# Patient Record
Sex: FEMALE | Race: WHITE | Employment: OTHER | URBAN - NONMETROPOLITAN AREA
[De-identification: names, ages, dates, MRNs, and addresses within clinical notes are randomized per-mention and may not be internally consistent; named-entity substitution may affect disease eponyms.]

---

## 2018-04-23 ENCOUNTER — HOSPITAL ENCOUNTER (OUTPATIENT)
Age: 73
Setting detail: SPECIMEN
Discharge: HOME | End: 2018-04-23
Payer: MEDICARE

## 2018-04-23 LAB
ALBUMIN SERPL-MCNC: 3.4 G/DL (ref 3.5–5.2)
ALP BLD-CCNC: 129 U/L (ref 35–104)
ALT SERPL-CCNC: 16 U/L (ref 0–32)
ANION GAP SERPL CALCULATED.3IONS-SCNC: 14 MMOL/L (ref 7–16)
APTT: 31.1 SEC (ref 24–35)
AST SERPL-CCNC: 34 U/L (ref 0–31)
BASOPHILS ABSOLUTE: 0.01 E9/L (ref 0–0.2)
BASOPHILS RELATIVE PERCENT: 0.4 % (ref 0–2)
BILIRUB SERPL-MCNC: 0.6 MG/DL (ref 0–1.2)
BUN BLDV-MCNC: 19 MG/DL (ref 8–23)
CALCIUM SERPL-MCNC: 9.3 MG/DL (ref 8.6–10.2)
CHLORIDE BLD-SCNC: 103 MMOL/L (ref 98–107)
CO2: 29 MMOL/L (ref 22–29)
CREAT SERPL-MCNC: 0.9 MG/DL (ref 0.5–1)
EOSINOPHILS ABSOLUTE: 0.09 E9/L (ref 0.05–0.5)
EOSINOPHILS RELATIVE PERCENT: 3.4 % (ref 0–6)
GFR AFRICAN AMERICAN: >60
GFR NON-AFRICAN AMERICAN: >60 ML/MIN/1.73
GLUCOSE BLD-MCNC: 126 MG/DL (ref 74–109)
HBA1C MFR BLD: 5.9 % (ref 4.8–5.9)
HCT VFR BLD CALC: 30.8 % (ref 34–48)
HEMOGLOBIN: 9.7 G/DL (ref 11.5–15.5)
IMMATURE GRANULOCYTES #: 0 E9/L
IMMATURE GRANULOCYTES %: 0 % (ref 0–5)
INR BLD: 1.1
LYMPHOCYTES ABSOLUTE: 0.95 E9/L (ref 1.5–4)
LYMPHOCYTES RELATIVE PERCENT: 35.4 % (ref 20–42)
MCH RBC QN AUTO: 25.9 PG (ref 26–35)
MCHC RBC AUTO-ENTMCNC: 31.5 % (ref 32–34.5)
MCV RBC AUTO: 82.1 FL (ref 80–99.9)
MONOCYTES ABSOLUTE: 0.16 E9/L (ref 0.1–0.95)
MONOCYTES RELATIVE PERCENT: 6 % (ref 2–12)
NEUTROPHILS ABSOLUTE: 1.47 E9/L (ref 1.8–7.3)
NEUTROPHILS RELATIVE PERCENT: 54.8 % (ref 43–80)
PDW BLD-RTO: 15.2 FL (ref 11.5–15)
PLATELET # BLD: 105 E9/L (ref 130–450)
PMV BLD AUTO: 10.6 FL (ref 7–12)
POTASSIUM SERPL-SCNC: 4.2 MMOL/L (ref 3.5–5)
PROTHROMBIN TIME: 13.1 SEC (ref 9.6–12.7)
RBC # BLD: 3.75 E12/L (ref 3.5–5.5)
SODIUM BLD-SCNC: 146 MMOL/L (ref 132–146)
T4 FREE: 1.21 NG/DL (ref 0.93–1.7)
TOTAL PROTEIN: 6.9 G/DL (ref 6.4–8.3)
TSH SERPL DL<=0.05 MIU/L-ACNC: 2.16 UIU/ML (ref 0.27–4.2)
WBC # BLD: 2.7 E9/L (ref 4.5–11.5)

## 2018-04-23 PROCEDURE — 83036 HEMOGLOBIN GLYCOSYLATED A1C: CPT

## 2018-04-23 PROCEDURE — 84443 ASSAY THYROID STIM HORMONE: CPT

## 2018-04-23 PROCEDURE — 85730 THROMBOPLASTIN TIME PARTIAL: CPT

## 2018-04-23 PROCEDURE — 85610 PROTHROMBIN TIME: CPT

## 2018-04-23 PROCEDURE — 36415 COLL VENOUS BLD VENIPUNCTURE: CPT

## 2018-04-23 PROCEDURE — 85025 COMPLETE CBC W/AUTO DIFF WBC: CPT

## 2018-04-23 PROCEDURE — 82105 ALPHA-FETOPROTEIN SERUM: CPT

## 2018-04-23 PROCEDURE — 84439 ASSAY OF FREE THYROXINE: CPT

## 2018-04-23 PROCEDURE — 80053 COMPREHEN METABOLIC PANEL: CPT

## 2018-04-25 LAB — AFP-TUMOR MARKER: 3 NG/ML (ref 0–9)

## 2018-08-13 ENCOUNTER — HOSPITAL ENCOUNTER (OUTPATIENT)
Age: 73
Discharge: HOME | End: 2018-08-15
Payer: MEDICARE

## 2018-08-13 LAB
ALBUMIN SERPL-MCNC: 3.5 G/DL (ref 3.5–5.2)
ALP BLD-CCNC: 133 U/L (ref 35–104)
ALT SERPL-CCNC: 11 U/L (ref 0–32)
ANION GAP SERPL CALCULATED.3IONS-SCNC: 17 MMOL/L (ref 7–16)
APTT: 34 SEC (ref 24.5–35.1)
AST SERPL-CCNC: 24 U/L (ref 0–31)
BASOPHILS ABSOLUTE: 0.03 E9/L (ref 0–0.2)
BASOPHILS RELATIVE PERCENT: 0.7 % (ref 0–2)
BILIRUB SERPL-MCNC: 0.9 MG/DL (ref 0–1.2)
BUN BLDV-MCNC: 17 MG/DL (ref 8–23)
CALCIUM SERPL-MCNC: 9 MG/DL (ref 8.6–10.2)
CHLORIDE BLD-SCNC: 100 MMOL/L (ref 98–107)
CO2: 23 MMOL/L (ref 22–29)
CREAT SERPL-MCNC: 1 MG/DL (ref 0.5–1)
EOSINOPHILS ABSOLUTE: 0.08 E9/L (ref 0.05–0.5)
EOSINOPHILS RELATIVE PERCENT: 1.9 % (ref 0–6)
FERRITIN: 21 NG/ML
GFR AFRICAN AMERICAN: >60
GFR NON-AFRICAN AMERICAN: 55 ML/MIN/1.73
GLUCOSE BLD-MCNC: 184 MG/DL (ref 74–109)
HCT VFR BLD CALC: 32.5 % (ref 34–48)
HEMOGLOBIN: 10.2 G/DL (ref 11.5–15.5)
IMMATURE GRANULOCYTES #: 0.02 E9/L
IMMATURE GRANULOCYTES %: 0.5 % (ref 0–5)
INR BLD: 1.1
IRON SATURATION: 20 % (ref 15–50)
IRON: 76 MCG/DL (ref 37–145)
LYMPHOCYTES ABSOLUTE: 0.77 E9/L (ref 1.5–4)
LYMPHOCYTES RELATIVE PERCENT: 18.6 % (ref 20–42)
MCH RBC QN AUTO: 25.2 PG (ref 26–35)
MCHC RBC AUTO-ENTMCNC: 31.4 % (ref 32–34.5)
MCV RBC AUTO: 80.2 FL (ref 80–99.9)
MONOCYTES ABSOLUTE: 0.32 E9/L (ref 0.1–0.95)
MONOCYTES RELATIVE PERCENT: 7.7 % (ref 2–12)
NEUTROPHILS ABSOLUTE: 2.93 E9/L (ref 1.8–7.3)
NEUTROPHILS RELATIVE PERCENT: 70.6 % (ref 43–80)
PDW BLD-RTO: 16.1 FL (ref 11.5–15)
PLATELET # BLD: 120 E9/L (ref 130–450)
PMV BLD AUTO: 12.2 FL (ref 7–12)
POTASSIUM SERPL-SCNC: 4.4 MMOL/L (ref 3.5–5)
PROTHROMBIN TIME: 13.1 SEC (ref 9.3–12.4)
RBC # BLD: 4.05 E12/L (ref 3.5–5.5)
SODIUM BLD-SCNC: 140 MMOL/L (ref 132–146)
TOTAL IRON BINDING CAPACITY: 384 MCG/DL (ref 250–450)
TOTAL PROTEIN: 7.2 G/DL (ref 6.4–8.3)
WBC # BLD: 4.2 E9/L (ref 4.5–11.5)

## 2018-08-13 PROCEDURE — 36415 COLL VENOUS BLD VENIPUNCTURE: CPT

## 2018-08-13 PROCEDURE — 85025 COMPLETE CBC W/AUTO DIFF WBC: CPT

## 2018-08-13 PROCEDURE — 83550 IRON BINDING TEST: CPT

## 2018-08-13 PROCEDURE — 82105 ALPHA-FETOPROTEIN SERUM: CPT

## 2018-08-13 PROCEDURE — 85610 PROTHROMBIN TIME: CPT

## 2018-08-13 PROCEDURE — 85730 THROMBOPLASTIN TIME PARTIAL: CPT

## 2018-08-13 PROCEDURE — 80053 COMPREHEN METABOLIC PANEL: CPT

## 2018-08-13 PROCEDURE — 83540 ASSAY OF IRON: CPT

## 2018-08-13 PROCEDURE — 82728 ASSAY OF FERRITIN: CPT

## 2018-08-15 LAB — AFP-TUMOR MARKER: 4 NG/ML (ref 0–9)

## 2019-01-01 ENCOUNTER — HOSPITAL ENCOUNTER (OUTPATIENT)
Dept: GENERAL RADIOLOGY | Age: 74
Discharge: HOME | End: 2019-10-23
Payer: MEDICARE

## 2019-01-01 ENCOUNTER — HOSPITAL ENCOUNTER (OUTPATIENT)
Age: 74
Discharge: HOME | End: 2019-10-01
Payer: MEDICARE

## 2019-01-01 ENCOUNTER — HOSPITAL ENCOUNTER (OUTPATIENT)
Dept: INTERVENTIONAL RADIOLOGY/VASCULAR | Age: 74
Discharge: HOME | End: 2019-10-21
Payer: MEDICARE

## 2019-01-01 ENCOUNTER — HOSPITAL ENCOUNTER (OUTPATIENT)
Age: 74
Discharge: HOME | End: 2019-09-29
Payer: MEDICARE

## 2019-01-01 ENCOUNTER — OFFICE VISIT (OUTPATIENT)
Dept: ONCOLOGY | Age: 74
End: 2019-01-01
Payer: MEDICARE

## 2019-01-01 ENCOUNTER — HOSPITAL ENCOUNTER (OUTPATIENT)
Dept: ULTRASOUND IMAGING | Age: 74
Discharge: HOME | End: 2019-09-29
Payer: MEDICARE

## 2019-01-01 ENCOUNTER — HOSPITAL ENCOUNTER (OUTPATIENT)
Dept: NON INVASIVE DIAGNOSTICS | Age: 74
Discharge: HOME | End: 2019-10-22
Payer: MEDICARE

## 2019-01-01 ENCOUNTER — HOSPITAL ENCOUNTER (OUTPATIENT)
Dept: INFUSION THERAPY | Age: 74
Discharge: HOME | End: 2019-10-02
Payer: MEDICARE

## 2019-01-01 ENCOUNTER — HOSPITAL ENCOUNTER (OUTPATIENT)
Age: 74
Discharge: HOME | End: 2019-09-09
Payer: MEDICARE

## 2019-01-01 ENCOUNTER — HOSPITAL ENCOUNTER (OUTPATIENT)
Dept: INFUSION THERAPY | Age: 74
Discharge: HOME | End: 2019-10-16
Payer: MEDICARE

## 2019-01-01 ENCOUNTER — HOSPITAL ENCOUNTER (OUTPATIENT)
Dept: CT IMAGING | Age: 74
Discharge: HOME | End: 2019-09-29
Payer: MEDICARE

## 2019-01-01 VITALS
DIASTOLIC BLOOD PRESSURE: 67 MMHG | WEIGHT: 246.9 LBS | TEMPERATURE: 96.9 F | BODY MASS INDEX: 41.13 KG/M2 | HEART RATE: 73 BPM | HEIGHT: 65 IN | SYSTOLIC BLOOD PRESSURE: 143 MMHG | RESPIRATION RATE: 16 BRPM

## 2019-01-01 VITALS
OXYGEN SATURATION: 92 % | HEIGHT: 65 IN | HEART RATE: 72 BPM | DIASTOLIC BLOOD PRESSURE: 61 MMHG | SYSTOLIC BLOOD PRESSURE: 135 MMHG | WEIGHT: 256.7 LBS | BODY MASS INDEX: 42.77 KG/M2 | TEMPERATURE: 96.6 F

## 2019-01-01 DIAGNOSIS — R53.81 OTHER MALAISE: ICD-10-CM

## 2019-01-01 DIAGNOSIS — J93.83 OTHER PNEUMOTHORAX: ICD-10-CM

## 2019-01-01 DIAGNOSIS — D64.9 NORMOCYTIC ANEMIA: Primary | ICD-10-CM

## 2019-01-01 DIAGNOSIS — R79.9 ABNORMAL FINDING OF BLOOD CHEMISTRY, UNSPECIFIED: ICD-10-CM

## 2019-01-01 DIAGNOSIS — R60.9 EDEMA, UNSPECIFIED TYPE: ICD-10-CM

## 2019-01-01 DIAGNOSIS — D61.818 PANCYTOPENIA (HCC): Primary | ICD-10-CM

## 2019-01-01 DIAGNOSIS — R06.09 DYSPNEA ON EXERTION: ICD-10-CM

## 2019-01-01 DIAGNOSIS — M54.40 ACUTE RIGHT-SIDED LOW BACK PAIN WITH SCIATICA, SCIATICA LATERALITY UNSPECIFIED: ICD-10-CM

## 2019-01-01 DIAGNOSIS — D61.818 PANCYTOPENIA (HCC): ICD-10-CM

## 2019-01-01 DIAGNOSIS — I85.01 BLEEDING ESOPHAGEAL VARICES, UNSPECIFIED ESOPHAGEAL VARICES TYPE (HCC): ICD-10-CM

## 2019-01-01 LAB
AFP-TUMOR MARKER: 3 NG/ML (ref 0–9)
ALBUMIN SERPL-MCNC: 2.9 G/DL (ref 3.5–5.2)
ALBUMIN SERPL-MCNC: 3.1 G/DL (ref 3.5–5.2)
ALP BLD-CCNC: 185 U/L (ref 35–104)
ALP BLD-CCNC: 204 U/L (ref 35–104)
ALT SERPL-CCNC: 12 U/L (ref 0–32)
ALT SERPL-CCNC: 13 U/L (ref 0–32)
ANION GAP SERPL CALCULATED.3IONS-SCNC: 15 MMOL/L (ref 7–16)
ANION GAP SERPL CALCULATED.3IONS-SCNC: 8 MMOL/L (ref 7–16)
ANISOCYTOSIS: ABNORMAL
ANISOCYTOSIS: ABNORMAL
ANTI-NUCLEAR ANTIBODY (ANA): NEGATIVE
APTT: 32.5 SEC (ref 24.5–35.1)
AST SERPL-CCNC: 36 U/L (ref 0–31)
AST SERPL-CCNC: 37 U/L (ref 0–31)
ATYPICAL LYMPHOCYTE RELATIVE PERCENT: 3 % (ref 0–4)
BASOPHILS ABSOLUTE: 0 E9/L (ref 0–0.2)
BASOPHILS ABSOLUTE: 0 E9/L (ref 0–0.2)
BASOPHILS RELATIVE PERCENT: 0 % (ref 0–2)
BASOPHILS RELATIVE PERCENT: 0.4 % (ref 0–2)
BILIRUB SERPL-MCNC: 0.6 MG/DL (ref 0–1.2)
BILIRUB SERPL-MCNC: 0.9 MG/DL (ref 0–1.2)
BODY FLUID CULTURE, STERILE: NORMAL
BUN BLDV-MCNC: 16 MG/DL (ref 8–23)
BUN BLDV-MCNC: 17 MG/DL (ref 8–23)
CALCIUM SERPL-MCNC: 8.8 MG/DL (ref 8.6–10.2)
CALCIUM SERPL-MCNC: 9.1 MG/DL (ref 8.6–10.2)
CHLORIDE BLD-SCNC: 104 MMOL/L (ref 98–107)
CHLORIDE BLD-SCNC: 106 MMOL/L (ref 98–107)
CHOLESTEROL, TOTAL: 112 MG/DL (ref 0–199)
CO2: 24 MMOL/L (ref 22–29)
CO2: 24 MMOL/L (ref 22–29)
COPPER: 119.1 UG/DL (ref 80–155)
CREAT SERPL-MCNC: 1.1 MG/DL (ref 0.5–1)
CREAT SERPL-MCNC: 1.2 MG/DL (ref 0.5–1)
D DIMER: 836 NG/ML DDU
DAT POLYSPECIFIC: NORMAL
EKG ATRIAL RATE: 75 BPM
EKG P AXIS: 51 DEGREES
EKG P-R INTERVAL: 130 MS
EKG Q-T INTERVAL: 412 MS
EKG QRS DURATION: 80 MS
EKG QTC CALCULATION (BAZETT): 460 MS
EKG R AXIS: 26 DEGREES
EKG T AXIS: 15 DEGREES
EKG VENTRICULAR RATE: 75 BPM
EOSINOPHILS ABSOLUTE: 0 E9/L (ref 0.05–0.5)
EOSINOPHILS ABSOLUTE: 0 E9/L (ref 0.05–0.5)
EOSINOPHILS RELATIVE PERCENT: 0 % (ref 0–6)
EOSINOPHILS RELATIVE PERCENT: 2.4 % (ref 0–6)
FERRITIN: 23 NG/ML
FOLATE: 12.6 NG/ML (ref 4.8–24.2)
GFR AFRICAN AMERICAN: 53
GFR AFRICAN AMERICAN: 59
GFR NON-AFRICAN AMERICAN: 44 ML/MIN/1.73
GFR NON-AFRICAN AMERICAN: 49 ML/MIN/1.73
GLUCOSE BLD-MCNC: 124 MG/DL (ref 74–99)
GLUCOSE BLD-MCNC: 129 MG/DL (ref 74–99)
GRAM STAIN RESULT: NORMAL
HBA1C MFR BLD: 5.1 % (ref 4–5.6)
HCT VFR BLD CALC: 25.7 % (ref 34–48)
HCT VFR BLD CALC: 29.2 % (ref 34–48)
HDLC SERPL-MCNC: 33 MG/DL
HEMOGLOBIN: 7.6 G/DL (ref 11.5–15.5)
HEMOGLOBIN: 8.2 G/DL (ref 11.5–15.5)
HIV-1 AND HIV-2 ANTIBODIES: NORMAL
HYPOCHROMIA: ABNORMAL
HYPOCHROMIA: ABNORMAL
IMMATURE RETIC FRACT: 26.5 % (ref 3–15.9)
INR BLD: 1
INR BLD: 1.1
IRON SATURATION: 11 % (ref 15–50)
IRON: 45 MCG/DL (ref 37–145)
LDL CHOLESTEROL CALCULATED: 61 MG/DL (ref 0–99)
LV EF: 73 %
LVEF MODALITY: NORMAL
LYMPHOCYTES ABSOLUTE: 0.45 E9/L (ref 1.5–4)
LYMPHOCYTES ABSOLUTE: 0.48 E9/L (ref 1.5–4)
LYMPHOCYTES RELATIVE PERCENT: 17 % (ref 20–42)
LYMPHOCYTES RELATIVE PERCENT: 18.3 % (ref 20–42)
Lab: NORMAL
MAGNESIUM: 1.5 MG/DL (ref 1.6–2.6)
MCH RBC QN AUTO: 23.1 PG (ref 26–35)
MCH RBC QN AUTO: 23.5 PG (ref 26–35)
MCHC RBC AUTO-ENTMCNC: 28.1 % (ref 32–34.5)
MCHC RBC AUTO-ENTMCNC: 29.6 % (ref 32–34.5)
MCV RBC AUTO: 79.3 FL (ref 80–99.9)
MCV RBC AUTO: 82.3 FL (ref 80–99.9)
MONOCYTES ABSOLUTE: 0.1 E9/L (ref 0.1–0.95)
MONOCYTES ABSOLUTE: 0.12 E9/L (ref 0.1–0.95)
MONOCYTES RELATIVE PERCENT: 4.3 % (ref 2–12)
MONOCYTES RELATIVE PERCENT: 5 % (ref 2–12)
MYELOCYTE PERCENT: 1 % (ref 0–0)
NEUTROPHILS ABSOLUTE: 1.8 E9/L (ref 1.8–7.3)
NEUTROPHILS ABSOLUTE: 1.93 E9/L (ref 1.8–7.3)
NEUTROPHILS RELATIVE PERCENT: 74 % (ref 43–80)
NEUTROPHILS RELATIVE PERCENT: 77.4 % (ref 43–80)
OVALOCYTES: ABNORMAL
OVALOCYTES: ABNORMAL
PARATHYROID HORMONE INTACT: 50 PG/ML (ref 15–65)
PATHOLOGIST REVIEW: NORMAL
PDW BLD-RTO: 17.3 FL (ref 11.5–15)
PDW BLD-RTO: 17.4 FL (ref 11.5–15)
PHOSPHORUS: 3.1 MG/DL (ref 2.5–4.5)
PLATELET # BLD: 80 E9/L (ref 130–450)
PLATELET # BLD: 94 E9/L (ref 130–450)
PLATELET # BLD: 95 E9/L (ref 130–450)
PLATELET CONFIRMATION: NORMAL
PMV BLD AUTO: 11.6 FL (ref 7–12)
PMV BLD AUTO: 12.2 FL (ref 7–12)
POIKILOCYTES: ABNORMAL
POIKILOCYTES: ABNORMAL
POLYCHROMASIA: ABNORMAL
POLYCHROMASIA: ABNORMAL
POTASSIUM SERPL-SCNC: 4.4 MMOL/L (ref 3.5–5)
POTASSIUM SERPL-SCNC: 4.6 MMOL/L (ref 3.5–5)
PRO-BNP: 534 PG/ML (ref 0–125)
PROTHROMBIN TIME: 11.3 SEC (ref 9.3–12.4)
PROTHROMBIN TIME: 12.6 SEC (ref 9.3–12.4)
RBC # BLD: 3.24 E12/L (ref 3.5–5.5)
RBC # BLD: 3.55 E12/L (ref 3.5–5.5)
REPORT: NORMAL
RETIC HGB EQUIVALENT: 25.2 PG (ref 28.2–36.6)
RETICULOCYTE ABSOLUTE COUNT: 0.08 E12/L
RETICULOCYTE COUNT PCT: 2.5 % (ref 0.4–1.9)
SODIUM BLD-SCNC: 138 MMOL/L (ref 132–146)
SODIUM BLD-SCNC: 143 MMOL/L (ref 132–146)
T4 FREE: 1.26 NG/DL (ref 0.93–1.7)
TARGET CELLS: ABNORMAL
TEAR DROP CELLS: ABNORMAL
THIS TEST SENT TO: NORMAL
TOTAL IRON BINDING CAPACITY: 397 MCG/DL (ref 250–450)
TOTAL PROTEIN: 6.7 G/DL (ref 6.4–8.3)
TOTAL PROTEIN: 6.9 G/DL (ref 6.4–8.3)
TRIGL SERPL-MCNC: 89 MG/DL (ref 0–149)
TSH SERPL DL<=0.05 MIU/L-ACNC: 4.65 UIU/ML (ref 0.27–4.2)
URIC ACID, SERUM: 4.5 MG/DL (ref 2.4–5.7)
VITAMIN B-12: 727 PG/ML (ref 211–946)
VITAMIN D 25-HYDROXY: 44 NG/ML (ref 30–100)
VLDLC SERPL CALC-MCNC: 18 MG/DL
WBC # BLD: 2.4 E9/L (ref 4.5–11.5)
WBC # BLD: 2.5 E9/L (ref 4.5–11.5)
ZINC: 45.9 UG/DL (ref 60–120)

## 2019-01-01 PROCEDURE — 82728 ASSAY OF FERRITIN: CPT

## 2019-01-01 PROCEDURE — 93970 EXTREMITY STUDY: CPT

## 2019-01-01 PROCEDURE — G8427 DOCREV CUR MEDS BY ELIG CLIN: HCPCS | Performed by: INTERNAL MEDICINE

## 2019-01-01 PROCEDURE — 71045 X-RAY EXAM CHEST 1 VIEW: CPT

## 2019-01-01 PROCEDURE — C1729 CATH, DRAINAGE: HCPCS

## 2019-01-01 PROCEDURE — G8484 FLU IMMUNIZE NO ADMIN: HCPCS | Performed by: INTERNAL MEDICINE

## 2019-01-01 PROCEDURE — 99214 OFFICE O/P EST MOD 30 MIN: CPT | Performed by: INTERNAL MEDICINE

## 2019-01-01 PROCEDURE — 86038 ANTINUCLEAR ANTIBODIES: CPT

## 2019-01-01 PROCEDURE — G8400 PT W/DXA NO RESULTS DOC: HCPCS | Performed by: INTERNAL MEDICINE

## 2019-01-01 PROCEDURE — 36415 COLL VENOUS BLD VENIPUNCTURE: CPT

## 2019-01-01 PROCEDURE — G8417 CALC BMI ABV UP PARAM F/U: HCPCS | Performed by: INTERNAL MEDICINE

## 2019-01-01 PROCEDURE — 82105 ALPHA-FETOPROTEIN SERUM: CPT

## 2019-01-01 PROCEDURE — 93005 ELECTROCARDIOGRAM TRACING: CPT | Performed by: GENERAL PRACTICE

## 2019-01-01 PROCEDURE — 82306 VITAMIN D 25 HYDROXY: CPT

## 2019-01-01 PROCEDURE — 87205 SMEAR GRAM STAIN: CPT

## 2019-01-01 PROCEDURE — 36415 COLL VENOUS BLD VENIPUNCTURE: CPT | Performed by: INTERNAL MEDICINE

## 2019-01-01 PROCEDURE — 4040F PNEUMOC VAC/ADMIN/RCVD: CPT | Performed by: INTERNAL MEDICINE

## 2019-01-01 PROCEDURE — 1090F PRES/ABSN URINE INCON ASSESS: CPT | Performed by: INTERNAL MEDICINE

## 2019-01-01 PROCEDURE — 85025 COMPLETE CBC W/AUTO DIFF WBC: CPT

## 2019-01-01 PROCEDURE — 80061 LIPID PANEL: CPT

## 2019-01-01 PROCEDURE — 80053 COMPREHEN METABOLIC PANEL: CPT

## 2019-01-01 PROCEDURE — 85610 PROTHROMBIN TIME: CPT

## 2019-01-01 PROCEDURE — 88112 CYTOPATH CELL ENHANCE TECH: CPT

## 2019-01-01 PROCEDURE — 3017F COLORECTAL CA SCREEN DOC REV: CPT | Performed by: INTERNAL MEDICINE

## 2019-01-01 PROCEDURE — 86880 COOMBS TEST DIRECT: CPT

## 2019-01-01 PROCEDURE — 71275 CT ANGIOGRAPHY CHEST: CPT

## 2019-01-01 PROCEDURE — 83970 ASSAY OF PARATHORMONE: CPT

## 2019-01-01 PROCEDURE — 83036 HEMOGLOBIN GLYCOSYLATED A1C: CPT

## 2019-01-01 PROCEDURE — 83540 ASSAY OF IRON: CPT

## 2019-01-01 PROCEDURE — 82746 ASSAY OF FOLIC ACID SERUM: CPT

## 2019-01-01 PROCEDURE — 88185 FLOWCYTOMETRY/TC ADD-ON: CPT

## 2019-01-01 PROCEDURE — 1123F ACP DISCUSS/DSCN MKR DOCD: CPT | Performed by: INTERNAL MEDICINE

## 2019-01-01 PROCEDURE — 85049 AUTOMATED PLATELET COUNT: CPT

## 2019-01-01 PROCEDURE — 87070 CULTURE OTHR SPECIMN AEROBIC: CPT

## 2019-01-01 PROCEDURE — 83735 ASSAY OF MAGNESIUM: CPT

## 2019-01-01 PROCEDURE — 88184 FLOWCYTOMETRY/ TC 1 MARKER: CPT

## 2019-01-01 PROCEDURE — 84439 ASSAY OF FREE THYROXINE: CPT

## 2019-01-01 PROCEDURE — 85730 THROMBOPLASTIN TIME PARTIAL: CPT

## 2019-01-01 PROCEDURE — 88305 TISSUE EXAM BY PATHOLOGIST: CPT

## 2019-01-01 PROCEDURE — 1036F TOBACCO NON-USER: CPT | Performed by: INTERNAL MEDICINE

## 2019-01-01 PROCEDURE — 82607 VITAMIN B-12: CPT

## 2019-01-01 PROCEDURE — 93308 TTE F-UP OR LMTD: CPT

## 2019-01-01 PROCEDURE — 84550 ASSAY OF BLOOD/URIC ACID: CPT

## 2019-01-01 PROCEDURE — 86703 HIV-1/HIV-2 1 RESULT ANTBDY: CPT

## 2019-01-01 PROCEDURE — 85045 AUTOMATED RETICULOCYTE COUNT: CPT

## 2019-01-01 PROCEDURE — 6360000004 HC RX CONTRAST MEDICATION: Performed by: RADIOLOGY

## 2019-01-01 PROCEDURE — 82525 ASSAY OF COPPER: CPT

## 2019-01-01 PROCEDURE — 99212 OFFICE O/P EST SF 10 MIN: CPT

## 2019-01-01 PROCEDURE — 85378 FIBRIN DEGRADE SEMIQUANT: CPT

## 2019-01-01 PROCEDURE — 84443 ASSAY THYROID STIM HORMONE: CPT

## 2019-01-01 PROCEDURE — 84100 ASSAY OF PHOSPHORUS: CPT

## 2019-01-01 PROCEDURE — 84630 ASSAY OF ZINC: CPT

## 2019-01-01 PROCEDURE — 83550 IRON BINDING TEST: CPT

## 2019-01-01 PROCEDURE — 83880 ASSAY OF NATRIURETIC PEPTIDE: CPT

## 2019-01-01 PROCEDURE — 32555 ASPIRATE PLEURA W/ IMAGING: CPT | Performed by: RADIOLOGY

## 2019-01-01 RX ORDER — LANOLIN ALCOHOL/MO/W.PET/CERES
325 CREAM (GRAM) TOPICAL 2 TIMES DAILY WITH MEALS
Qty: 60 TABLET | Refills: 3 | Status: SHIPPED
Start: 2019-01-01 | End: 2020-01-01 | Stop reason: ALTCHOICE

## 2019-01-01 RX ORDER — ZINC SULFATE 50(220)MG
220 CAPSULE ORAL DAILY
Qty: 30 CAPSULE | Refills: 3 | Status: ON HOLD
Start: 2019-01-01 | End: 2020-01-01 | Stop reason: HOSPADM

## 2019-01-01 RX ADMIN — IOPAMIDOL 80 ML: 755 INJECTION, SOLUTION INTRAVENOUS at 09:46

## 2019-03-04 ENCOUNTER — HOSPITAL ENCOUNTER (OUTPATIENT)
Dept: NUCLEAR MEDICINE | Age: 74
Discharge: HOME | End: 2019-03-04
Payer: MEDICARE

## 2019-03-04 DIAGNOSIS — K80.20 GALLSTONES: ICD-10-CM

## 2019-03-04 PROCEDURE — A9537 TC99M MEBROFENIN: HCPCS | Performed by: RADIOLOGY

## 2019-03-04 PROCEDURE — 3430000000 HC RX DIAGNOSTIC RADIOPHARMACEUTICAL: Performed by: RADIOLOGY

## 2019-03-04 PROCEDURE — 78226 HEPATOBILIARY SYSTEM IMAGING: CPT

## 2019-03-04 RX ADMIN — Medication 6 MILLICURIE: at 10:40

## 2019-04-12 ENCOUNTER — PREP FOR PROCEDURE (OUTPATIENT)
Dept: SURGERY | Age: 74
End: 2019-04-12

## 2019-04-12 ENCOUNTER — INITIAL CONSULT (OUTPATIENT)
Dept: SURGERY | Age: 74
End: 2019-04-12
Payer: MEDICARE

## 2019-04-12 VITALS
WEIGHT: 240 LBS | TEMPERATURE: 97.9 F | BODY MASS INDEX: 39.99 KG/M2 | DIASTOLIC BLOOD PRESSURE: 65 MMHG | HEART RATE: 69 BPM | SYSTOLIC BLOOD PRESSURE: 143 MMHG | HEIGHT: 65 IN

## 2019-04-12 DIAGNOSIS — K80.12 CALCULUS OF GALLBLADDER WITH ACUTE ON CHRONIC CHOLECYSTITIS WITHOUT OBSTRUCTION: ICD-10-CM

## 2019-04-12 DIAGNOSIS — K80.12 CALCULUS OF GALLBLADDER WITH ACUTE ON CHRONIC CHOLECYSTITIS WITHOUT OBSTRUCTION: Primary | ICD-10-CM

## 2019-04-12 PROCEDURE — 3017F COLORECTAL CA SCREEN DOC REV: CPT | Performed by: SURGERY

## 2019-04-12 PROCEDURE — 1090F PRES/ABSN URINE INCON ASSESS: CPT | Performed by: SURGERY

## 2019-04-12 PROCEDURE — G8427 DOCREV CUR MEDS BY ELIG CLIN: HCPCS | Performed by: SURGERY

## 2019-04-12 PROCEDURE — 99204 OFFICE O/P NEW MOD 45 MIN: CPT | Performed by: SURGERY

## 2019-04-12 PROCEDURE — G8419 CALC BMI OUT NRM PARAM NOF/U: HCPCS | Performed by: SURGERY

## 2019-04-12 RX ORDER — TRAZODONE HYDROCHLORIDE 50 MG/1
100 TABLET ORAL NIGHTLY
Refills: 5 | COMMUNITY
Start: 2019-03-27

## 2019-04-12 RX ORDER — FUROSEMIDE 20 MG/1
20 TABLET ORAL EVERY MORNING
Status: ON HOLD | COMMUNITY
End: 2020-01-01 | Stop reason: SDUPTHER

## 2019-04-12 RX ORDER — SODIUM CHLORIDE, SODIUM LACTATE, POTASSIUM CHLORIDE, CALCIUM CHLORIDE 600; 310; 30; 20 MG/100ML; MG/100ML; MG/100ML; MG/100ML
INJECTION, SOLUTION INTRAVENOUS CONTINUOUS
Status: CANCELLED | OUTPATIENT
Start: 2019-04-12

## 2019-04-12 RX ORDER — GABAPENTIN 100 MG/1
100 CAPSULE ORAL 3 TIMES DAILY
Refills: 0 | COMMUNITY
Start: 2019-03-28

## 2019-04-12 NOTE — PROGRESS NOTES
(CORGARD) 20 MG tablet Take 0.5 tablets by mouth daily. Yes Golden Cotton,    sertraline (ZOLOFT) 50 MG tablet Take 50 mg by mouth daily. Yes Historical Provider, MD   allopurinol (ZYLOPRIM) 100 MG tablet Take 100 mg by mouth daily. Yes Historical Provider, MD   levothyroxine (SYNTHROID) 25 MCG tablet Take 25 mcg by mouth Daily. Yes Historical Provider, MD       Allergies: Patient has no known allergies. Social History:   TOBACCO:   reports that she has never smoked. She has never used smokeless tobacco.  All smokers must join the free smoking cessation program and stop smoking for 3 months before having any Bariatric surgery. ETOH:    reports that she does not drink alcohol. No family history on file. Review of Systems:  Psychiatric:  depression and anxiety  Respiratory: negative  Cardiovascular: negative  Gastrointestinal: constipation  Musculoskeletal:negative  All others reviewed, negative    Physical Exam:   VITALS: Blood pressure (!) 143/65, pulse 69, temperature 97.9 °F (36.6 °C), temperature source Oral, height 5' 5\" (1.651 m), weight 240 lb (108.9 kg). General appearance: alert, appears stated age and cooperative, does ambulate easily  Head: Normocephalic, without obvious abnormality, atraumatic  Eyes: PERRL  Ears/mouth/throat:  Ears clear, mouth normal, throat no redness  Neck: no adenopathy, no JVD, supple, symmetrical, trachea midline and thyroid not enlarged  Lungs: clear to auscultation bilaterally  Heart: regular rate and rhythm  Abdomen: soft, non-tender; bowel sounds normal; no masses,  no organomegaly  Extremities: 3+ edema  Skin: no open wounds    Assessment:  RUQ pain, gallstones and chronic cholecystitis. Chronic liver cirrhosis and esophageal varices 2016. Hgb 10.0  DVT with filter    Plan:   Cardiac clearance. Low calorie, high protein diet.   Laparoscopic cholecystectomy with cholangiogram, possible open (12232)    Discussed the risk of surgery including bleeding, infection,

## 2019-04-16 ENCOUNTER — TELEPHONE (OUTPATIENT)
Dept: SURGERY | Age: 74
End: 2019-04-16

## 2019-04-16 NOTE — TELEPHONE ENCOUNTER
Per Dr Rox Newsome patient to be scheduled for Laparoscopic cholecystectomy with cholangiogram, possible open (48816) once Cardiac Clearance obtained. Pt scheduled for ECHO on 4/30/19. Awaiting cardiac clearance.  Electronically signed by Emeterio Schwab MA on 4/16/19 at 9:36 AM

## 2019-04-18 NOTE — TELEPHONE ENCOUNTER
Call placed to Dr Rosalia Gibbons office to follow up on process, they state they have called patient 2x and still no answer Vm left. , myself then called patient to follow up no answwer Vm left.  Electronically signed by Tobin Segura MA on 4/18/19 at 1:42 PM

## 2019-04-19 ENCOUNTER — HOSPITAL ENCOUNTER (OUTPATIENT)
Age: 74
Discharge: HOME | End: 2019-04-21
Payer: MEDICARE

## 2019-04-19 PROCEDURE — 87328 CRYPTOSPORIDIUM AG IA: CPT

## 2019-04-19 PROCEDURE — 87045 FECES CULTURE AEROBIC BACT: CPT

## 2019-04-19 PROCEDURE — 83993 ASSAY FOR CALPROTECTIN FECAL: CPT

## 2019-04-19 PROCEDURE — 87329 GIARDIA AG IA: CPT

## 2019-04-19 PROCEDURE — 83520 IMMUNOASSAY QUANT NOS NONAB: CPT

## 2019-04-19 PROCEDURE — 87324 CLOSTRIDIUM AG IA: CPT

## 2019-04-19 PROCEDURE — 89055 LEUKOCYTE ASSESSMENT FECAL: CPT

## 2019-04-20 LAB
C DIFFICILE TOXIN, EIA: NORMAL
WHITE BLOOD CELLS (WBC), STOOL: NORMAL

## 2019-04-21 LAB — CULTURE, STOOL: NORMAL

## 2019-04-22 LAB
CRYPTOSPORIDIUM ANTIGEN STOOL: NORMAL
GIARDIA ANTIGEN STOOL: NORMAL

## 2019-04-23 NOTE — TELEPHONE ENCOUNTER
Patient called in stating she no longer wants surgery will call if she wants to schedule.  Electronically signed by Robert Garcia MA on 4/23/19 at 3:59 PM

## 2019-04-24 LAB
Lab: NORMAL
Lab: NORMAL
REPORT: NORMAL
REPORT: NORMAL
THIS TEST SENT TO: NORMAL
THIS TEST SENT TO: NORMAL

## 2019-04-30 ENCOUNTER — HOSPITAL ENCOUNTER (OUTPATIENT)
Dept: NON INVASIVE DIAGNOSTICS | Age: 74
Discharge: HOME | End: 2019-04-30
Payer: MEDICARE

## 2019-04-30 LAB
LV EF: 71 %
LVEF MODALITY: NORMAL

## 2019-04-30 PROCEDURE — 93306 TTE W/DOPPLER COMPLETE: CPT

## 2020-01-01 ENCOUNTER — APPOINTMENT (OUTPATIENT)
Dept: ULTRASOUND IMAGING | Age: 75
DRG: 193 | End: 2020-01-01
Payer: MEDICARE

## 2020-01-01 ENCOUNTER — APPOINTMENT (OUTPATIENT)
Dept: GENERAL RADIOLOGY | Age: 75
DRG: 193 | End: 2020-01-01
Payer: MEDICARE

## 2020-01-01 ENCOUNTER — APPOINTMENT (OUTPATIENT)
Dept: CT IMAGING | Age: 75
DRG: 193 | End: 2020-01-01
Payer: MEDICARE

## 2020-01-01 ENCOUNTER — APPOINTMENT (OUTPATIENT)
Dept: GENERAL RADIOLOGY | Age: 75
DRG: 870 | End: 2020-01-01
Attending: INTERNAL MEDICINE
Payer: MEDICARE

## 2020-01-01 ENCOUNTER — APPOINTMENT (OUTPATIENT)
Dept: ULTRASOUND IMAGING | Age: 75
DRG: 870 | End: 2020-01-01
Attending: INTERNAL MEDICINE
Payer: MEDICARE

## 2020-01-01 ENCOUNTER — HOSPITAL ENCOUNTER (INPATIENT)
Age: 75
LOS: 8 days | DRG: 870 | End: 2020-05-27
Attending: INTERNAL MEDICINE | Admitting: INTERNAL MEDICINE
Payer: MEDICARE

## 2020-01-01 ENCOUNTER — HOSPITAL ENCOUNTER (OUTPATIENT)
Dept: GENERAL RADIOLOGY | Age: 75
Discharge: HOME | End: 2020-01-22
Payer: MEDICARE

## 2020-01-01 ENCOUNTER — HOSPITAL ENCOUNTER (OUTPATIENT)
Dept: INTERVENTIONAL RADIOLOGY/VASCULAR | Age: 75
Discharge: HOME | End: 2020-01-20
Payer: MEDICARE

## 2020-01-01 ENCOUNTER — HOSPITAL ENCOUNTER (INPATIENT)
Age: 75
LOS: 16 days | Discharge: NURSING, REHAB OR LONG-TERM CARE | DRG: 193 | End: 2020-04-30
Attending: EMERGENCY MEDICINE | Admitting: INTERNAL MEDICINE
Payer: MEDICARE

## 2020-01-01 ENCOUNTER — TELEPHONE (OUTPATIENT)
Dept: CARDIOLOGY CLINIC | Age: 75
End: 2020-01-01

## 2020-01-01 ENCOUNTER — ANESTHESIA EVENT (OUTPATIENT)
Dept: OPERATING ROOM | Age: 75
DRG: 193 | End: 2020-01-01
Payer: MEDICARE

## 2020-01-01 ENCOUNTER — HOSPITAL ENCOUNTER (OUTPATIENT)
Age: 75
Discharge: HOME | End: 2020-01-20
Payer: MEDICARE

## 2020-01-01 ENCOUNTER — ANESTHESIA (OUTPATIENT)
Dept: OPERATING ROOM | Age: 75
DRG: 193 | End: 2020-01-01
Payer: MEDICARE

## 2020-01-01 VITALS
HEART RATE: 72 BPM | SYSTOLIC BLOOD PRESSURE: 115 MMHG | OXYGEN SATURATION: 98 % | TEMPERATURE: 97.4 F | BODY MASS INDEX: 40.69 KG/M2 | RESPIRATION RATE: 16 BRPM | DIASTOLIC BLOOD PRESSURE: 56 MMHG | HEIGHT: 65 IN | WEIGHT: 244.25 LBS

## 2020-01-01 VITALS
OXYGEN SATURATION: 97 % | RESPIRATION RATE: 24 BRPM | SYSTOLIC BLOOD PRESSURE: 111 MMHG | TEMPERATURE: 96.5 F | DIASTOLIC BLOOD PRESSURE: 52 MMHG | BODY MASS INDEX: 45.18 KG/M2 | HEIGHT: 65 IN | WEIGHT: 271.17 LBS

## 2020-01-01 VITALS — RESPIRATION RATE: 9 BRPM | OXYGEN SATURATION: 96 % | TEMPERATURE: 97 F

## 2020-01-01 LAB
AADO2: 155.6 MMHG
AADO2: 160.2 MMHG
AADO2: 166 MMHG
AADO2: 216.3 MMHG
AADO2: 221.7 MMHG
AADO2: 241.5 MMHG
AADO2: 257.9 MMHG
ABO/RH: NORMAL
ABO/RH: NORMAL
ACANTHOCYTES: ABNORMAL
ADENOVIRUS BY PCR: NOT DETECTED
AFB CULTURE (MYCOBACTERIA): NORMAL
AFB SMEAR: NORMAL
AFP-TUMOR MARKER: 2 NG/ML (ref 0–9)
ALBUMIN SERPL-MCNC: 2 G/DL (ref 3.5–5.2)
ALBUMIN SERPL-MCNC: 2.1 G/DL (ref 3.5–5.2)
ALBUMIN SERPL-MCNC: 2.1 G/DL (ref 3.5–5.2)
ALBUMIN SERPL-MCNC: 2.4 G/DL (ref 3.5–5.2)
ALBUMIN SERPL-MCNC: 2.5 G/DL (ref 3.5–5.2)
ALBUMIN SERPL-MCNC: 2.8 G/DL (ref 3.5–5.2)
ALBUMIN SERPL-MCNC: 3 G/DL (ref 3.5–5.2)
ALBUMIN SERPL-MCNC: 3 G/DL (ref 3.5–5.2)
ALP BLD-CCNC: 186 U/L (ref 35–104)
ALP BLD-CCNC: 213 U/L (ref 35–104)
ALP BLD-CCNC: 222 U/L (ref 35–104)
ALP BLD-CCNC: 224 U/L (ref 35–104)
ALP BLD-CCNC: 242 U/L (ref 35–104)
ALP BLD-CCNC: 250 U/L (ref 35–104)
ALP BLD-CCNC: 298 U/L (ref 35–104)
ALP BLD-CCNC: 343 U/L (ref 35–104)
ALT SERPL-CCNC: 15 U/L (ref 0–32)
ALT SERPL-CCNC: 16 U/L (ref 0–32)
ALT SERPL-CCNC: 19 U/L (ref 0–32)
ALT SERPL-CCNC: 23 U/L (ref 0–32)
ALT SERPL-CCNC: 24 U/L (ref 0–32)
ALT SERPL-CCNC: 26 U/L (ref 0–32)
ALT SERPL-CCNC: 30 U/L (ref 0–32)
ALT SERPL-CCNC: 32 U/L (ref 0–32)
AMYLASE FLUID: 31 U/L
ANAEROBIC CULTURE: NORMAL
ANION GAP SERPL CALCULATED.3IONS-SCNC: 10 MMOL/L (ref 7–16)
ANION GAP SERPL CALCULATED.3IONS-SCNC: 10 MMOL/L (ref 7–16)
ANION GAP SERPL CALCULATED.3IONS-SCNC: 18 MMOL/L (ref 7–16)
ANION GAP SERPL CALCULATED.3IONS-SCNC: 18 MMOL/L (ref 7–16)
ANION GAP SERPL CALCULATED.3IONS-SCNC: 19 MMOL/L (ref 7–16)
ANION GAP SERPL CALCULATED.3IONS-SCNC: 19 MMOL/L (ref 7–16)
ANION GAP SERPL CALCULATED.3IONS-SCNC: 20 MMOL/L (ref 7–16)
ANION GAP SERPL CALCULATED.3IONS-SCNC: 21 MMOL/L (ref 7–16)
ANION GAP SERPL CALCULATED.3IONS-SCNC: 23 MMOL/L (ref 7–16)
ANION GAP SERPL CALCULATED.3IONS-SCNC: 25 MMOL/L (ref 7–16)
ANION GAP SERPL CALCULATED.3IONS-SCNC: 29 MMOL/L (ref 7–16)
ANION GAP SERPL CALCULATED.3IONS-SCNC: 5 MMOL/L (ref 7–16)
ANION GAP SERPL CALCULATED.3IONS-SCNC: 6 MMOL/L (ref 7–16)
ANION GAP SERPL CALCULATED.3IONS-SCNC: 6 MMOL/L (ref 7–16)
ANION GAP SERPL CALCULATED.3IONS-SCNC: 7 MMOL/L (ref 7–16)
ANION GAP SERPL CALCULATED.3IONS-SCNC: 8 MMOL/L (ref 7–16)
ANION GAP SERPL CALCULATED.3IONS-SCNC: 9 MMOL/L (ref 7–16)
ANISOCYTOSIS: ABNORMAL
ANTIBODY IDENTIFICATION: NORMAL
ANTIBODY SCREEN: NORMAL
ANTIBODY SCREEN: NORMAL
APPEARANCE FLUID: NORMAL
AST SERPL-CCNC: 129 U/L (ref 0–31)
AST SERPL-CCNC: 167 U/L (ref 0–31)
AST SERPL-CCNC: 31 U/L (ref 0–31)
AST SERPL-CCNC: 35 U/L (ref 0–31)
AST SERPL-CCNC: 41 U/L (ref 0–31)
AST SERPL-CCNC: 48 U/L (ref 0–31)
AST SERPL-CCNC: 58 U/L (ref 0–31)
AST SERPL-CCNC: 78 U/L (ref 0–31)
B.E.: -1.2 MMOL/L (ref -3–3)
B.E.: -1.7 MMOL/L (ref -3–3)
B.E.: -10.8 MMOL/L (ref -3–0)
B.E.: -2.1 MMOL/L (ref -3–3)
B.E.: -3.5 MMOL/L (ref -3–3)
B.E.: -7.7 MMOL/L (ref -3–3)
B.E.: 1.3 MMOL/L (ref -3–3)
B.E.: 3 MMOL/L (ref -3–3)
B.E.: 4.2 MMOL/L (ref -3–3)
B.E.: 4.2 MMOL/L (ref -3–3)
BACTERIA: ABNORMAL /HPF
BASOPHILIC STIPPLING: ABNORMAL
BASOPHILS ABSOLUTE: 0 E9/L (ref 0–0.2)
BASOPHILS ABSOLUTE: 0.01 E9/L (ref 0–0.2)
BASOPHILS ABSOLUTE: 0.02 E9/L (ref 0–0.2)
BASOPHILS ABSOLUTE: 0.04 E9/L (ref 0–0.2)
BASOPHILS RELATIVE PERCENT: 0 % (ref 0–2)
BASOPHILS RELATIVE PERCENT: 0.1 % (ref 0–2)
BASOPHILS RELATIVE PERCENT: 0.1 % (ref 0–2)
BASOPHILS RELATIVE PERCENT: 0.2 % (ref 0–2)
BASOPHILS RELATIVE PERCENT: 0.3 % (ref 0–2)
BASOPHILS RELATIVE PERCENT: 0.9 % (ref 0–2)
BILIRUB SERPL-MCNC: 0.3 MG/DL (ref 0–1.2)
BILIRUB SERPL-MCNC: 0.5 MG/DL (ref 0–1.2)
BILIRUB SERPL-MCNC: 0.6 MG/DL (ref 0–1.2)
BILIRUB SERPL-MCNC: 0.6 MG/DL (ref 0–1.2)
BILIRUB SERPL-MCNC: 0.7 MG/DL (ref 0–1.2)
BILIRUB SERPL-MCNC: 0.7 MG/DL (ref 0–1.2)
BILIRUB SERPL-MCNC: 1 MG/DL (ref 0–1.2)
BILIRUB SERPL-MCNC: 1.3 MG/DL (ref 0–1.2)
BILIRUBIN DIRECT: 0.3 MG/DL (ref 0–0.3)
BILIRUBIN URINE: NEGATIVE
BILIRUBIN, INDIRECT: 0.7 MG/DL (ref 0–1)
BLOOD BANK DISPENSE STATUS: NORMAL
BLOOD BANK PRODUCT CODE: NORMAL
BLOOD CULTURE, ROUTINE: NORMAL
BLOOD, URINE: ABNORMAL
BODY FLUID CULTURE, STERILE: NORMAL
BODY FLUID CULTURE, STERILE: NORMAL
BORDETELLA PARAPERTUSSIS BY PCR: NOT DETECTED
BORDETELLA PERTUSSIS BY PCR: NOT DETECTED
BPU ID: NORMAL
BUN BLDV-MCNC: 104 MG/DL (ref 8–23)
BUN BLDV-MCNC: 13 MG/DL (ref 8–23)
BUN BLDV-MCNC: 19 MG/DL (ref 8–23)
BUN BLDV-MCNC: 22 MG/DL (ref 8–23)
BUN BLDV-MCNC: 23 MG/DL (ref 8–23)
BUN BLDV-MCNC: 23 MG/DL (ref 8–23)
BUN BLDV-MCNC: 26 MG/DL (ref 8–23)
BUN BLDV-MCNC: 31 MG/DL (ref 8–23)
BUN BLDV-MCNC: 31 MG/DL (ref 8–23)
BUN BLDV-MCNC: 36 MG/DL (ref 8–23)
BUN BLDV-MCNC: 37 MG/DL (ref 8–23)
BUN BLDV-MCNC: 38 MG/DL (ref 8–23)
BUN BLDV-MCNC: 43 MG/DL (ref 8–23)
BUN BLDV-MCNC: 44 MG/DL (ref 8–23)
BUN BLDV-MCNC: 47 MG/DL (ref 8–23)
BUN BLDV-MCNC: 50 MG/DL (ref 8–23)
BUN BLDV-MCNC: 59 MG/DL (ref 8–23)
BUN BLDV-MCNC: 63 MG/DL (ref 8–23)
BUN BLDV-MCNC: 65 MG/DL (ref 8–23)
BURR CELLS: ABNORMAL
C-REACTIVE PROTEIN: 1.4 MG/DL (ref 0–0.4)
CALCIUM SERPL-MCNC: 6.7 MG/DL (ref 8.6–10.2)
CALCIUM SERPL-MCNC: 7.4 MG/DL (ref 8.6–10.2)
CALCIUM SERPL-MCNC: 7.6 MG/DL (ref 8.6–10.2)
CALCIUM SERPL-MCNC: 7.7 MG/DL (ref 8.6–10.2)
CALCIUM SERPL-MCNC: 7.8 MG/DL (ref 8.6–10.2)
CALCIUM SERPL-MCNC: 7.8 MG/DL (ref 8.6–10.2)
CALCIUM SERPL-MCNC: 7.9 MG/DL (ref 8.6–10.2)
CALCIUM SERPL-MCNC: 8 MG/DL (ref 8.6–10.2)
CALCIUM SERPL-MCNC: 8.2 MG/DL (ref 8.6–10.2)
CALCIUM SERPL-MCNC: 8.4 MG/DL (ref 8.6–10.2)
CALCIUM SERPL-MCNC: 8.4 MG/DL (ref 8.6–10.2)
CALCIUM SERPL-MCNC: 8.5 MG/DL (ref 8.6–10.2)
CALCIUM SERPL-MCNC: 8.5 MG/DL (ref 8.6–10.2)
CALCIUM SERPL-MCNC: 8.6 MG/DL (ref 8.6–10.2)
CALCIUM SERPL-MCNC: 8.7 MG/DL (ref 8.6–10.2)
CALCIUM SERPL-MCNC: 8.8 MG/DL (ref 8.6–10.2)
CALCIUM SERPL-MCNC: 9 MG/DL (ref 8.6–10.2)
CALCIUM SERPL-MCNC: 9.2 MG/DL (ref 8.6–10.2)
CELL COUNT FLUID TYPE: NORMAL
CHLAMYDOPHILIA PNEUMONIAE BY PCR: NOT DETECTED
CHLORIDE BLD-SCNC: 105 MMOL/L (ref 98–107)
CHLORIDE BLD-SCNC: 106 MMOL/L (ref 98–107)
CHLORIDE BLD-SCNC: 107 MMOL/L (ref 98–107)
CHLORIDE BLD-SCNC: 86 MMOL/L (ref 98–107)
CHLORIDE BLD-SCNC: 86 MMOL/L (ref 98–107)
CHLORIDE BLD-SCNC: 88 MMOL/L (ref 98–107)
CHLORIDE BLD-SCNC: 90 MMOL/L (ref 98–107)
CHLORIDE BLD-SCNC: 91 MMOL/L (ref 98–107)
CHLORIDE BLD-SCNC: 91 MMOL/L (ref 98–107)
CHLORIDE BLD-SCNC: 92 MMOL/L (ref 98–107)
CHLORIDE BLD-SCNC: 92 MMOL/L (ref 98–107)
CHLORIDE BLD-SCNC: 94 MMOL/L (ref 98–107)
CLARITY: CLEAR
CO2: 14 MMOL/L (ref 22–29)
CO2: 17 MMOL/L (ref 22–29)
CO2: 20 MMOL/L (ref 22–29)
CO2: 21 MMOL/L (ref 22–29)
CO2: 22 MMOL/L (ref 22–29)
CO2: 23 MMOL/L (ref 22–29)
CO2: 25 MMOL/L (ref 22–29)
CO2: 25 MMOL/L (ref 22–29)
CO2: 26 MMOL/L (ref 22–29)
CO2: 27 MMOL/L (ref 22–29)
CO2: 27 MMOL/L (ref 22–29)
CO2: 28 MMOL/L (ref 22–29)
CO2: 29 MMOL/L (ref 22–29)
CO2: 32 MMOL/L (ref 22–29)
COHB: 0.1 % (ref 0–1.5)
COHB: 0.4 % (ref 0–1.5)
COHB: 0.4 % (ref 0–1.5)
COHB: 0.5 % (ref 0–1.5)
COHB: 0.6 % (ref 0–1.5)
COHB: 0.8 % (ref 0–1.5)
COHB: 0.8 % (ref 0–1.5)
COHB: 1 % (ref 0–1.5)
COHB: 1.2 % (ref 0–1.5)
COLOR FLUID: YELLOW
COLOR: YELLOW
CORONAVIRUS 229E BY PCR: NOT DETECTED
CORONAVIRUS HKU1 BY PCR: NOT DETECTED
CORONAVIRUS NL63 BY PCR: NOT DETECTED
CORONAVIRUS OC43 BY PCR: NOT DETECTED
CORTISOL TOTAL: 20.38 MCG/DL (ref 2.68–18.4)
CREAT SERPL-MCNC: 0.9 MG/DL (ref 0.5–1)
CREAT SERPL-MCNC: 1 MG/DL (ref 0.5–1)
CREAT SERPL-MCNC: 1.1 MG/DL (ref 0.5–1)
CREAT SERPL-MCNC: 1.2 MG/DL (ref 0.5–1)
CREAT SERPL-MCNC: 2.9 MG/DL (ref 0.5–1)
CREAT SERPL-MCNC: 3 MG/DL (ref 0.5–1)
CREAT SERPL-MCNC: 3.3 MG/DL (ref 0.5–1)
CREAT SERPL-MCNC: 3.4 MG/DL (ref 0.5–1)
CREAT SERPL-MCNC: 3.5 MG/DL (ref 0.5–1)
CREAT SERPL-MCNC: 3.5 MG/DL (ref 0.5–1)
CREAT SERPL-MCNC: 3.6 MG/DL (ref 0.5–1)
CREAT SERPL-MCNC: 4.6 MG/DL (ref 0.5–1)
CREAT SERPL-MCNC: 6.6 MG/DL (ref 0.5–1)
CREATININE URINE: 118 MG/DL (ref 29–226)
CRITICAL: ABNORMAL
CULTURE CATHETER TIP: NORMAL
CULTURE, BLOOD 2: NORMAL
CULTURE, RESPIRATORY: ABNORMAL
CULTURE, RESPIRATORY: ABNORMAL
D DIMER: 1934 NG/ML DDU
DAT POLYSPECIFIC: NORMAL
DAT POLYSPECIFIC: NORMAL
DATE ANALYZED: ABNORMAL
DATE OF COLLECTION: ABNORMAL
DESCRIPTION BLOOD BANK: NORMAL
DEVICE: ABNORMAL
DR. NOTIFY: NORMAL
DR. NOTIFY: NORMAL
EKG ATRIAL RATE: 59 BPM
EKG ATRIAL RATE: 61 BPM
EKG ATRIAL RATE: 62 BPM
EKG ATRIAL RATE: 62 BPM
EKG ATRIAL RATE: 66 BPM
EKG ATRIAL RATE: 67 BPM
EKG ATRIAL RATE: 67 BPM
EKG ATRIAL RATE: 68 BPM
EKG ATRIAL RATE: 68 BPM
EKG ATRIAL RATE: 69 BPM
EKG ATRIAL RATE: 69 BPM
EKG ATRIAL RATE: 74 BPM
EKG ATRIAL RATE: 75 BPM
EKG P AXIS: 2 DEGREES
EKG P AXIS: 21 DEGREES
EKG P AXIS: 24 DEGREES
EKG P AXIS: 27 DEGREES
EKG P AXIS: 33 DEGREES
EKG P AXIS: 43 DEGREES
EKG P AXIS: 43 DEGREES
EKG P AXIS: 45 DEGREES
EKG P AXIS: 50 DEGREES
EKG P AXIS: 52 DEGREES
EKG P AXIS: 59 DEGREES
EKG P-R INTERVAL: 122 MS
EKG P-R INTERVAL: 124 MS
EKG P-R INTERVAL: 124 MS
EKG P-R INTERVAL: 126 MS
EKG P-R INTERVAL: 128 MS
EKG P-R INTERVAL: 130 MS
EKG P-R INTERVAL: 132 MS
EKG P-R INTERVAL: 132 MS
EKG P-R INTERVAL: 134 MS
EKG P-R INTERVAL: 134 MS
EKG P-R INTERVAL: 136 MS
EKG P-R INTERVAL: 136 MS
EKG P-R INTERVAL: 96 MS
EKG Q-T INTERVAL: 392 MS
EKG Q-T INTERVAL: 414 MS
EKG Q-T INTERVAL: 416 MS
EKG Q-T INTERVAL: 416 MS
EKG Q-T INTERVAL: 420 MS
EKG Q-T INTERVAL: 422 MS
EKG Q-T INTERVAL: 434 MS
EKG Q-T INTERVAL: 438 MS
EKG Q-T INTERVAL: 440 MS
EKG Q-T INTERVAL: 446 MS
EKG Q-T INTERVAL: 448 MS
EKG Q-T INTERVAL: 454 MS
EKG Q-T INTERVAL: 454 MS
EKG QRS DURATION: 78 MS
EKG QRS DURATION: 80 MS
EKG QRS DURATION: 82 MS
EKG QRS DURATION: 82 MS
EKG QRS DURATION: 84 MS
EKG QRS DURATION: 90 MS
EKG QTC CALCULATION (BAZETT): 428 MS
EKG QTC CALCULATION (BAZETT): 435 MS
EKG QTC CALCULATION (BAZETT): 435 MS
EKG QTC CALCULATION (BAZETT): 439 MS
EKG QTC CALCULATION (BAZETT): 440 MS
EKG QTC CALCULATION (BAZETT): 440 MS
EKG QTC CALCULATION (BAZETT): 450 MS
EKG QTC CALCULATION (BAZETT): 458 MS
EKG QTC CALCULATION (BAZETT): 460 MS
EKG QTC CALCULATION (BAZETT): 464 MS
EKG QTC CALCULATION (BAZETT): 467 MS
EKG QTC CALCULATION (BAZETT): 480 MS
EKG QTC CALCULATION (BAZETT): 482 MS
EKG R AXIS: 1 DEGREES
EKG R AXIS: 14 DEGREES
EKG R AXIS: 16 DEGREES
EKG R AXIS: 19 DEGREES
EKG R AXIS: 2 DEGREES
EKG R AXIS: 20 DEGREES
EKG R AXIS: 24 DEGREES
EKG R AXIS: 28 DEGREES
EKG R AXIS: 29 DEGREES
EKG R AXIS: 29 DEGREES
EKG R AXIS: 33 DEGREES
EKG R AXIS: 51 DEGREES
EKG R AXIS: 65 DEGREES
EKG T AXIS: -8 DEGREES
EKG T AXIS: 10 DEGREES
EKG T AXIS: 10 DEGREES
EKG T AXIS: 12 DEGREES
EKG T AXIS: 13 DEGREES
EKG T AXIS: 14 DEGREES
EKG T AXIS: 15 DEGREES
EKG T AXIS: 18 DEGREES
EKG T AXIS: 2 DEGREES
EKG T AXIS: 3 DEGREES
EKG T AXIS: 3 DEGREES
EKG T AXIS: 34 DEGREES
EKG T AXIS: 4 DEGREES
EKG VENTRICULAR RATE: 59 BPM
EKG VENTRICULAR RATE: 61 BPM
EKG VENTRICULAR RATE: 62 BPM
EKG VENTRICULAR RATE: 62 BPM
EKG VENTRICULAR RATE: 66 BPM
EKG VENTRICULAR RATE: 67 BPM
EKG VENTRICULAR RATE: 67 BPM
EKG VENTRICULAR RATE: 68 BPM
EKG VENTRICULAR RATE: 68 BPM
EKG VENTRICULAR RATE: 69 BPM
EKG VENTRICULAR RATE: 69 BPM
EKG VENTRICULAR RATE: 74 BPM
EKG VENTRICULAR RATE: 75 BPM
EOSINOPHILS ABSOLUTE: 0 E9/L (ref 0.05–0.5)
EOSINOPHILS ABSOLUTE: 0.01 E9/L (ref 0.05–0.5)
EOSINOPHILS RELATIVE PERCENT: 0 % (ref 0–6)
EOSINOPHILS RELATIVE PERCENT: 0.2 % (ref 0–6)
EOSINOPHILS RELATIVE PERCENT: 0.4 % (ref 0–6)
EOSINOPHILS RELATIVE PERCENT: 0.4 % (ref 0–6)
EOSINOPHILS RELATIVE PERCENT: 0.8 % (ref 0–6)
EOSINOPHILS RELATIVE PERCENT: 1 % (ref 0–6)
EPITHELIAL CELLS, UA: ABNORMAL /HPF
FERRITIN: 21 NG/ML
FERRITIN: 803 NG/ML
FIO2 ARTERIAL: 50
FIO2: 40 %
FIO2: 50 %
FIO2: 55 %
FIO2: 80 %
FLUID TYPE: NORMAL
FUNGUS (MYCOLOGY) CULTURE: NORMAL
FUNGUS STAIN: NORMAL
GFR AFRICAN AMERICAN: 11
GFR AFRICAN AMERICAN: 15
GFR AFRICAN AMERICAN: 16
GFR AFRICAN AMERICAN: 17
GFR AFRICAN AMERICAN: 19
GFR AFRICAN AMERICAN: 19
GFR AFRICAN AMERICAN: 53
GFR AFRICAN AMERICAN: 59
GFR AFRICAN AMERICAN: 7
GFR AFRICAN AMERICAN: >60
GFR NON-AFRICAN AMERICAN: 12 ML/MIN/1.73
GFR NON-AFRICAN AMERICAN: 13 ML/MIN/1.73
GFR NON-AFRICAN AMERICAN: 14 ML/MIN/1.73
GFR NON-AFRICAN AMERICAN: 15 ML/MIN/1.73
GFR NON-AFRICAN AMERICAN: 16 ML/MIN/1.73
GFR NON-AFRICAN AMERICAN: 44 ML/MIN/1.73
GFR NON-AFRICAN AMERICAN: 49 ML/MIN/1.73
GFR NON-AFRICAN AMERICAN: 54 ML/MIN/1.73
GFR NON-AFRICAN AMERICAN: 6 ML/MIN/1.73
GFR NON-AFRICAN AMERICAN: 9 ML/MIN/1.73
GFR NON-AFRICAN AMERICAN: >60 ML/MIN/1.73
GLUCOSE BLD-MCNC: 107 MG/DL (ref 74–99)
GLUCOSE BLD-MCNC: 113 MG/DL (ref 74–99)
GLUCOSE BLD-MCNC: 116 MG/DL (ref 74–99)
GLUCOSE BLD-MCNC: 122 MG/DL (ref 74–99)
GLUCOSE BLD-MCNC: 124 MG/DL (ref 74–99)
GLUCOSE BLD-MCNC: 125 MG/DL (ref 74–99)
GLUCOSE BLD-MCNC: 127 MG/DL (ref 74–99)
GLUCOSE BLD-MCNC: 130 MG/DL (ref 74–99)
GLUCOSE BLD-MCNC: 132 MG/DL (ref 74–99)
GLUCOSE BLD-MCNC: 139 MG/DL (ref 74–99)
GLUCOSE BLD-MCNC: 145 MG/DL (ref 74–99)
GLUCOSE BLD-MCNC: 150 MG/DL (ref 74–99)
GLUCOSE BLD-MCNC: 165 MG/DL (ref 74–99)
GLUCOSE BLD-MCNC: 168 MG/DL (ref 74–99)
GLUCOSE BLD-MCNC: 172 MG/DL (ref 74–99)
GLUCOSE BLD-MCNC: 176 MG/DL (ref 74–99)
GLUCOSE BLD-MCNC: 196 MG/DL (ref 74–99)
GLUCOSE BLD-MCNC: 205 MG/DL (ref 74–99)
GLUCOSE BLD-MCNC: 211 MG/DL (ref 74–99)
GLUCOSE BLD-MCNC: 214 MG/DL (ref 74–99)
GLUCOSE BLD-MCNC: 243 MG/DL (ref 74–99)
GLUCOSE BLD-MCNC: 248 MG/DL (ref 74–99)
GLUCOSE URINE: NEGATIVE MG/DL
GLUCOSE, FLUID: 141 MG/DL
GRAM STAIN ORDERABLE: NORMAL
GRAM STAIN ORDERABLE: NORMAL
GRAM STAIN RESULT: NORMAL
GRAM STAIN RESULT: NORMAL
HAV IGM SER IA-ACNC: NORMAL
HBA1C MFR BLD: 5.5 % (ref 4–5.6)
HBV SURFACE AB TITR SER: NORMAL {TITER}
HCO3 ARTERIAL: 14.8 MMOL/L (ref 22–26)
HCO3: 15.6 MMOL/L (ref 22–26)
HCO3: 22.7 MMOL/L (ref 22–26)
HCO3: 22.9 MMOL/L (ref 22–26)
HCO3: 23.2 MMOL/L (ref 22–26)
HCO3: 24.9 MMOL/L (ref 22–26)
HCO3: 25.7 MMOL/L (ref 22–26)
HCO3: 26.1 MMOL/L (ref 22–26)
HCO3: 27.6 MMOL/L (ref 22–26)
HCO3: 27.8 MMOL/L (ref 22–26)
HCT VFR BLD CALC: 19.7 % (ref 34–48)
HCT VFR BLD CALC: 24.5 % (ref 34–48)
HCT VFR BLD CALC: 25.1 % (ref 34–48)
HCT VFR BLD CALC: 25.7 % (ref 34–48)
HCT VFR BLD CALC: 26.4 % (ref 34–48)
HCT VFR BLD CALC: 26.6 % (ref 34–48)
HCT VFR BLD CALC: 26.8 % (ref 34–48)
HCT VFR BLD CALC: 26.9 % (ref 34–48)
HCT VFR BLD CALC: 28.1 % (ref 34–48)
HCT VFR BLD CALC: 28.6 % (ref 34–48)
HCT VFR BLD CALC: 28.7 % (ref 34–48)
HCT VFR BLD CALC: 29.3 % (ref 34–48)
HCT VFR BLD CALC: 29.6 % (ref 34–48)
HCT VFR BLD CALC: 29.7 % (ref 34–48)
HCT VFR BLD CALC: 29.8 % (ref 34–48)
HCT VFR BLD CALC: 29.8 % (ref 34–48)
HCT VFR BLD CALC: 30.4 % (ref 34–48)
HCT VFR BLD CALC: 30.4 % (ref 34–48)
HCT VFR BLD CALC: 32.5 % (ref 34–48)
HCT VFR BLD CALC: 34.1 % (ref 34–48)
HEMOGLOBIN: 10.7 G/DL (ref 11.5–15.5)
HEMOGLOBIN: 10.8 G/DL (ref 11.5–15.5)
HEMOGLOBIN: 6.5 G/DL (ref 11.5–15.5)
HEMOGLOBIN: 7.1 G/DL (ref 11.5–15.5)
HEMOGLOBIN: 7.5 G/DL (ref 11.5–15.5)
HEMOGLOBIN: 7.6 G/DL (ref 11.5–15.5)
HEMOGLOBIN: 8.2 G/DL (ref 11.5–15.5)
HEMOGLOBIN: 8.4 G/DL (ref 11.5–15.5)
HEMOGLOBIN: 8.4 G/DL (ref 11.5–15.5)
HEMOGLOBIN: 8.5 G/DL (ref 11.5–15.5)
HEMOGLOBIN: 8.6 G/DL (ref 11.5–15.5)
HEMOGLOBIN: 8.7 G/DL (ref 11.5–15.5)
HEMOGLOBIN: 8.8 G/DL (ref 11.5–15.5)
HEMOGLOBIN: 8.8 G/DL (ref 11.5–15.5)
HEMOGLOBIN: 8.9 G/DL (ref 11.5–15.5)
HEMOGLOBIN: 9.3 G/DL (ref 11.5–15.5)
HEMOGLOBIN: 9.5 G/DL (ref 11.5–15.5)
HEPATITIS B CORE IGM ANTIBODY: NORMAL
HEPATITIS B SURFACE ANTIGEN INTERPRETATION: NORMAL
HEPATITIS C ANTIBODY INTERPRETATION: NORMAL
HHB: 1.4 % (ref 0–5)
HHB: 3.1 % (ref 0–5)
HHB: 4.6 % (ref 0–5)
HHB: 4.8 % (ref 0–5)
HHB: 4.8 % (ref 0–5)
HHB: 4.9 % (ref 0–5)
HHB: 5.7 % (ref 0–5)
HHB: 6.2 % (ref 0–5)
HHB: 8 % (ref 0–5)
HUMAN METAPNEUMOVIRUS BY PCR: NOT DETECTED
HUMAN RHINOVIRUS/ENTEROVIRUS BY PCR: NOT DETECTED
HYPOCHROMIA: ABNORMAL
IMMATURE GRANULOCYTES #: 0.03 E9/L
IMMATURE GRANULOCYTES #: 0.09 E9/L
IMMATURE GRANULOCYTES #: 0.92 E9/L
IMMATURE GRANULOCYTES %: 0.5 % (ref 0–5)
IMMATURE GRANULOCYTES %: 0.8 % (ref 0–5)
IMMATURE GRANULOCYTES %: 5.5 % (ref 0–5)
INFLUENZA A BY PCR: NOT DETECTED
INFLUENZA B BY PCR: NOT DETECTED
INR BLD: 1.1
INR BLD: 1.2
INR BLD: 1.2
IRON SATURATION: 59 % (ref 15–50)
IRON: 72 MCG/DL (ref 37–145)
KETONES, URINE: NEGATIVE MG/DL
L. PNEUMOPHILA SEROGP 1 UR AG: NORMAL
LAB: ABNORMAL
LACTATE DEHYDROGENASE: 259 U/L (ref 135–214)
LACTATE DEHYDROGENASE: 405 U/L (ref 135–214)
LACTIC ACID: 8.8 MMOL/L (ref 0.5–2.2)
LD, FLUID: 68 U/L
LEUKOCYTE ESTERASE, URINE: ABNORMAL
LYMPHOCYTES ABSOLUTE: 0.16 E9/L (ref 1.5–4)
LYMPHOCYTES ABSOLUTE: 0.21 E9/L (ref 1.5–4)
LYMPHOCYTES ABSOLUTE: 0.24 E9/L (ref 1.5–4)
LYMPHOCYTES ABSOLUTE: 0.27 E9/L (ref 1.5–4)
LYMPHOCYTES ABSOLUTE: 0.3 E9/L (ref 1.5–4)
LYMPHOCYTES ABSOLUTE: 0.3 E9/L (ref 1.5–4)
LYMPHOCYTES ABSOLUTE: 0.32 E9/L (ref 1.5–4)
LYMPHOCYTES ABSOLUTE: 0.33 E9/L (ref 1.5–4)
LYMPHOCYTES ABSOLUTE: 0.39 E9/L (ref 1.5–4)
LYMPHOCYTES ABSOLUTE: 0.57 E9/L (ref 1.5–4)
LYMPHOCYTES ABSOLUTE: 0.74 E9/L (ref 1.5–4)
LYMPHOCYTES ABSOLUTE: 0.88 E9/L (ref 1.5–4)
LYMPHOCYTES ABSOLUTE: 0.9 E9/L (ref 1.5–4)
LYMPHOCYTES ABSOLUTE: 1.03 E9/L (ref 1.5–4)
LYMPHOCYTES ABSOLUTE: 1.16 E9/L (ref 1.5–4)
LYMPHOCYTES ABSOLUTE: 1.31 E9/L (ref 1.5–4)
LYMPHOCYTES RELATIVE PERCENT: 1.7 % (ref 20–42)
LYMPHOCYTES RELATIVE PERCENT: 11.3 % (ref 20–42)
LYMPHOCYTES RELATIVE PERCENT: 12.4 % (ref 20–42)
LYMPHOCYTES RELATIVE PERCENT: 14 % (ref 20–42)
LYMPHOCYTES RELATIVE PERCENT: 2.6 % (ref 20–42)
LYMPHOCYTES RELATIVE PERCENT: 2.6 % (ref 20–42)
LYMPHOCYTES RELATIVE PERCENT: 3.5 % (ref 20–42)
LYMPHOCYTES RELATIVE PERCENT: 5.2 % (ref 20–42)
LYMPHOCYTES RELATIVE PERCENT: 5.3 % (ref 20–42)
LYMPHOCYTES RELATIVE PERCENT: 7 % (ref 20–42)
LYMPHOCYTES RELATIVE PERCENT: 7 % (ref 20–42)
LYMPHOCYTES RELATIVE PERCENT: 7.8 % (ref 20–42)
LYMPHOCYTES RELATIVE PERCENT: 7.8 % (ref 20–42)
LYMPHOCYTES RELATIVE PERCENT: 8.3 % (ref 20–42)
LYMPHOCYTES RELATIVE PERCENT: 9.6 % (ref 20–42)
LYMPHOCYTES RELATIVE PERCENT: 9.6 % (ref 20–42)
Lab: ABNORMAL
Lab: NORMAL
MAGNESIUM: 1.8 MG/DL (ref 1.6–2.6)
MCH RBC QN AUTO: 22.2 PG (ref 26–35)
MCH RBC QN AUTO: 22.5 PG (ref 26–35)
MCH RBC QN AUTO: 23 PG (ref 26–35)
MCH RBC QN AUTO: 23.2 PG (ref 26–35)
MCH RBC QN AUTO: 23.7 PG (ref 26–35)
MCH RBC QN AUTO: 23.8 PG (ref 26–35)
MCH RBC QN AUTO: 23.8 PG (ref 26–35)
MCH RBC QN AUTO: 24.2 PG (ref 26–35)
MCH RBC QN AUTO: 24.2 PG (ref 26–35)
MCH RBC QN AUTO: 24.9 PG (ref 26–35)
MCH RBC QN AUTO: 24.9 PG (ref 26–35)
MCH RBC QN AUTO: 25.2 PG (ref 26–35)
MCH RBC QN AUTO: 25.8 PG (ref 26–35)
MCH RBC QN AUTO: 26 PG (ref 26–35)
MCH RBC QN AUTO: 26.1 PG (ref 26–35)
MCHC RBC AUTO-ENTMCNC: 28.2 % (ref 32–34.5)
MCHC RBC AUTO-ENTMCNC: 28.4 % (ref 32–34.5)
MCHC RBC AUTO-ENTMCNC: 28.6 % (ref 32–34.5)
MCHC RBC AUTO-ENTMCNC: 28.9 % (ref 32–34.5)
MCHC RBC AUTO-ENTMCNC: 28.9 % (ref 32–34.5)
MCHC RBC AUTO-ENTMCNC: 29 % (ref 32–34.5)
MCHC RBC AUTO-ENTMCNC: 29.6 % (ref 32–34.5)
MCHC RBC AUTO-ENTMCNC: 29.9 % (ref 32–34.5)
MCHC RBC AUTO-ENTMCNC: 30.1 % (ref 32–34.5)
MCHC RBC AUTO-ENTMCNC: 31.4 % (ref 32–34.5)
MCHC RBC AUTO-ENTMCNC: 31.7 % (ref 32–34.5)
MCHC RBC AUTO-ENTMCNC: 31.9 % (ref 32–34.5)
MCHC RBC AUTO-ENTMCNC: 32.6 % (ref 32–34.5)
MCHC RBC AUTO-ENTMCNC: 32.7 % (ref 32–34.5)
MCHC RBC AUTO-ENTMCNC: 33 % (ref 32–34.5)
MCHC RBC AUTO-ENTMCNC: 33.1 % (ref 32–34.5)
MCHC RBC AUTO-ENTMCNC: 33.2 % (ref 32–34.5)
MCV RBC AUTO: 75.5 FL (ref 80–99.9)
MCV RBC AUTO: 77.2 FL (ref 80–99.9)
MCV RBC AUTO: 78.3 FL (ref 80–99.9)
MCV RBC AUTO: 78.4 FL (ref 80–99.9)
MCV RBC AUTO: 78.9 FL (ref 80–99.9)
MCV RBC AUTO: 79.3 FL (ref 80–99.9)
MCV RBC AUTO: 79.5 FL (ref 80–99.9)
MCV RBC AUTO: 79.9 FL (ref 80–99.9)
MCV RBC AUTO: 80.2 FL (ref 80–99.9)
MCV RBC AUTO: 80.3 FL (ref 80–99.9)
MCV RBC AUTO: 81 FL (ref 80–99.9)
MCV RBC AUTO: 81.2 FL (ref 80–99.9)
MCV RBC AUTO: 81.9 FL (ref 80–99.9)
MCV RBC AUTO: 82.3 FL (ref 80–99.9)
MCV RBC AUTO: 83.1 FL (ref 80–99.9)
METAMYELOCYTES RELATIVE PERCENT: 0.9 % (ref 0–1)
METAMYELOCYTES RELATIVE PERCENT: 3.5 % (ref 0–1)
METER GLUCOSE: 101 MG/DL (ref 74–99)
METER GLUCOSE: 102 MG/DL (ref 74–99)
METER GLUCOSE: 103 MG/DL (ref 74–99)
METER GLUCOSE: 105 MG/DL (ref 74–99)
METER GLUCOSE: 108 MG/DL (ref 74–99)
METER GLUCOSE: 111 MG/DL (ref 74–99)
METER GLUCOSE: 113 MG/DL (ref 74–99)
METER GLUCOSE: 113 MG/DL (ref 74–99)
METER GLUCOSE: 114 MG/DL (ref 74–99)
METER GLUCOSE: 116 MG/DL (ref 74–99)
METER GLUCOSE: 117 MG/DL (ref 74–99)
METER GLUCOSE: 119 MG/DL (ref 74–99)
METER GLUCOSE: 123 MG/DL (ref 74–99)
METER GLUCOSE: 124 MG/DL (ref 74–99)
METER GLUCOSE: 125 MG/DL (ref 74–99)
METER GLUCOSE: 125 MG/DL (ref 74–99)
METER GLUCOSE: 126 MG/DL (ref 74–99)
METER GLUCOSE: 128 MG/DL (ref 74–99)
METER GLUCOSE: 128 MG/DL (ref 74–99)
METER GLUCOSE: 129 MG/DL (ref 74–99)
METER GLUCOSE: 130 MG/DL (ref 74–99)
METER GLUCOSE: 132 MG/DL (ref 74–99)
METER GLUCOSE: 133 MG/DL (ref 74–99)
METER GLUCOSE: 134 MG/DL (ref 74–99)
METER GLUCOSE: 134 MG/DL (ref 74–99)
METER GLUCOSE: 136 MG/DL (ref 74–99)
METER GLUCOSE: 137 MG/DL (ref 74–99)
METER GLUCOSE: 140 MG/DL (ref 74–99)
METER GLUCOSE: 141 MG/DL (ref 74–99)
METER GLUCOSE: 142 MG/DL (ref 74–99)
METER GLUCOSE: 143 MG/DL (ref 74–99)
METER GLUCOSE: 145 MG/DL (ref 74–99)
METER GLUCOSE: 145 MG/DL (ref 74–99)
METER GLUCOSE: 146 MG/DL (ref 74–99)
METER GLUCOSE: 146 MG/DL (ref 74–99)
METER GLUCOSE: 147 MG/DL (ref 74–99)
METER GLUCOSE: 147 MG/DL (ref 74–99)
METER GLUCOSE: 148 MG/DL (ref 74–99)
METER GLUCOSE: 149 MG/DL (ref 74–99)
METER GLUCOSE: 150 MG/DL (ref 74–99)
METER GLUCOSE: 151 MG/DL (ref 74–99)
METER GLUCOSE: 152 MG/DL (ref 74–99)
METER GLUCOSE: 154 MG/DL (ref 74–99)
METER GLUCOSE: 156 MG/DL (ref 74–99)
METER GLUCOSE: 156 MG/DL (ref 74–99)
METER GLUCOSE: 158 MG/DL (ref 74–99)
METER GLUCOSE: 159 MG/DL (ref 74–99)
METER GLUCOSE: 162 MG/DL (ref 74–99)
METER GLUCOSE: 163 MG/DL (ref 74–99)
METER GLUCOSE: 164 MG/DL (ref 74–99)
METER GLUCOSE: 165 MG/DL (ref 74–99)
METER GLUCOSE: 165 MG/DL (ref 74–99)
METER GLUCOSE: 167 MG/DL (ref 74–99)
METER GLUCOSE: 170 MG/DL (ref 74–99)
METER GLUCOSE: 171 MG/DL (ref 74–99)
METER GLUCOSE: 172 MG/DL (ref 74–99)
METER GLUCOSE: 172 MG/DL (ref 74–99)
METER GLUCOSE: 182 MG/DL (ref 74–99)
METER GLUCOSE: 185 MG/DL (ref 74–99)
METER GLUCOSE: 188 MG/DL (ref 74–99)
METER GLUCOSE: 192 MG/DL (ref 74–99)
METER GLUCOSE: 194 MG/DL (ref 74–99)
METER GLUCOSE: 198 MG/DL (ref 74–99)
METER GLUCOSE: 199 MG/DL (ref 74–99)
METER GLUCOSE: 201 MG/DL (ref 74–99)
METER GLUCOSE: 202 MG/DL (ref 74–99)
METER GLUCOSE: 204 MG/DL (ref 74–99)
METER GLUCOSE: 206 MG/DL (ref 74–99)
METER GLUCOSE: 206 MG/DL (ref 74–99)
METER GLUCOSE: 212 MG/DL (ref 74–99)
METER GLUCOSE: 217 MG/DL (ref 74–99)
METER GLUCOSE: 218 MG/DL (ref 74–99)
METER GLUCOSE: 221 MG/DL (ref 74–99)
METER GLUCOSE: 223 MG/DL (ref 74–99)
METER GLUCOSE: 225 MG/DL (ref 74–99)
METER GLUCOSE: 225 MG/DL (ref 74–99)
METER GLUCOSE: 231 MG/DL (ref 74–99)
METER GLUCOSE: 234 MG/DL (ref 74–99)
METER GLUCOSE: 246 MG/DL (ref 74–99)
METER GLUCOSE: 249 MG/DL (ref 74–99)
METER GLUCOSE: 252 MG/DL (ref 74–99)
METER GLUCOSE: 252 MG/DL (ref 74–99)
METER GLUCOSE: 253 MG/DL (ref 74–99)
METER GLUCOSE: 261 MG/DL (ref 74–99)
METER GLUCOSE: 270 MG/DL (ref 74–99)
METER GLUCOSE: 274 MG/DL (ref 74–99)
METER GLUCOSE: 279 MG/DL (ref 74–99)
METER GLUCOSE: 283 MG/DL (ref 74–99)
METER GLUCOSE: 283 MG/DL (ref 74–99)
METHB: 0.1 % (ref 0–1.5)
METHB: 0.3 % (ref 0–1.5)
METHB: 0.3 % (ref 0–1.5)
METHB: 0.4 % (ref 0–1.5)
METHB: 0.4 % (ref 0–1.5)
METHB: 0.5 % (ref 0–1.5)
METHB: 0.7 % (ref 0–1.5)
MODE: ABNORMAL
MODE: ABNORMAL
MODE: AC
MONOCYTE, FLUID: 92 %
MONOCYTES ABSOLUTE: 0 E9/L (ref 0.1–0.95)
MONOCYTES ABSOLUTE: 0.05 E9/L (ref 0.1–0.95)
MONOCYTES ABSOLUTE: 0.09 E9/L (ref 0.1–0.95)
MONOCYTES ABSOLUTE: 0.09 E9/L (ref 0.1–0.95)
MONOCYTES ABSOLUTE: 0.1 E9/L (ref 0.1–0.95)
MONOCYTES ABSOLUTE: 0.16 E9/L (ref 0.1–0.95)
MONOCYTES ABSOLUTE: 0.16 E9/L (ref 0.1–0.95)
MONOCYTES ABSOLUTE: 0.32 E9/L (ref 0.1–0.95)
MONOCYTES ABSOLUTE: 0.52 E9/L (ref 0.1–0.95)
MONOCYTES ABSOLUTE: 0.56 E9/L (ref 0.1–0.95)
MONOCYTES ABSOLUTE: 0.82 E9/L (ref 0.1–0.95)
MONOCYTES ABSOLUTE: 0.82 E9/L (ref 0.1–0.95)
MONOCYTES ABSOLUTE: 0.88 E9/L (ref 0.1–0.95)
MONOCYTES ABSOLUTE: 1 E9/L (ref 0.1–0.95)
MONOCYTES RELATIVE PERCENT: 0.9 % (ref 2–12)
MONOCYTES RELATIVE PERCENT: 1.7 % (ref 2–12)
MONOCYTES RELATIVE PERCENT: 1.8 % (ref 2–12)
MONOCYTES RELATIVE PERCENT: 1.8 % (ref 2–12)
MONOCYTES RELATIVE PERCENT: 2.6 % (ref 2–12)
MONOCYTES RELATIVE PERCENT: 2.9 % (ref 2–12)
MONOCYTES RELATIVE PERCENT: 3.4 % (ref 2–12)
MONOCYTES RELATIVE PERCENT: 3.5 % (ref 2–12)
MONOCYTES RELATIVE PERCENT: 3.5 % (ref 2–12)
MONOCYTES RELATIVE PERCENT: 5.2 % (ref 2–12)
MONOCYTES RELATIVE PERCENT: 5.2 % (ref 2–12)
MONOCYTES RELATIVE PERCENT: 6.1 % (ref 2–12)
MONOCYTES RELATIVE PERCENT: 7.4 % (ref 2–12)
MONOCYTES RELATIVE PERCENT: 8.7 % (ref 2–12)
MRSA CULTURE ONLY: NORMAL
MYCOPLASMA PNEUMONIAE BY PCR: NOT DETECTED
MYELOCYTE PERCENT: 0.9 % (ref 0–0)
NEUTROPHIL, FLUID: 8 %
NEUTROPHILS ABSOLUTE: 1.93 E9/L (ref 1.8–7.3)
NEUTROPHILS ABSOLUTE: 13.95 E9/L (ref 1.8–7.3)
NEUTROPHILS ABSOLUTE: 14.27 E9/L (ref 1.8–7.3)
NEUTROPHILS ABSOLUTE: 15.27 E9/L (ref 1.8–7.3)
NEUTROPHILS ABSOLUTE: 15.49 E9/L (ref 1.8–7.3)
NEUTROPHILS ABSOLUTE: 18.75 E9/L (ref 1.8–7.3)
NEUTROPHILS ABSOLUTE: 2.64 E9/L (ref 1.8–7.3)
NEUTROPHILS ABSOLUTE: 3.06 E9/L (ref 1.8–7.3)
NEUTROPHILS ABSOLUTE: 3.39 E9/L (ref 1.8–7.3)
NEUTROPHILS ABSOLUTE: 3.5 E9/L (ref 1.8–7.3)
NEUTROPHILS ABSOLUTE: 4.52 E9/L (ref 1.8–7.3)
NEUTROPHILS ABSOLUTE: 4.67 E9/L (ref 1.8–7.3)
NEUTROPHILS ABSOLUTE: 4.98 E9/L (ref 1.8–7.3)
NEUTROPHILS ABSOLUTE: 4.99 E9/L (ref 1.8–7.3)
NEUTROPHILS ABSOLUTE: 8.83 E9/L (ref 1.8–7.3)
NEUTROPHILS ABSOLUTE: 9.53 E9/L (ref 1.8–7.3)
NEUTROPHILS RELATIVE PERCENT: 78 % (ref 43–80)
NEUTROPHILS RELATIVE PERCENT: 83.9 % (ref 43–80)
NEUTROPHILS RELATIVE PERCENT: 84.2 % (ref 43–80)
NEUTROPHILS RELATIVE PERCENT: 84.3 % (ref 43–80)
NEUTROPHILS RELATIVE PERCENT: 86.8 % (ref 43–80)
NEUTROPHILS RELATIVE PERCENT: 87 % (ref 43–80)
NEUTROPHILS RELATIVE PERCENT: 87 % (ref 43–80)
NEUTROPHILS RELATIVE PERCENT: 87.8 % (ref 43–80)
NEUTROPHILS RELATIVE PERCENT: 87.8 % (ref 43–80)
NEUTROPHILS RELATIVE PERCENT: 90.4 % (ref 43–80)
NEUTROPHILS RELATIVE PERCENT: 91.2 % (ref 43–80)
NEUTROPHILS RELATIVE PERCENT: 91.3 % (ref 43–80)
NEUTROPHILS RELATIVE PERCENT: 92.2 % (ref 43–80)
NEUTROPHILS RELATIVE PERCENT: 93 % (ref 43–80)
NEUTROPHILS RELATIVE PERCENT: 95.6 % (ref 43–80)
NEUTROPHILS RELATIVE PERCENT: 96.5 % (ref 43–80)
NITRITE, URINE: NEGATIVE
NUCLEATED CELLS FLUID: 188 /UL
NUCLEATED RED BLOOD CELLS: 0.9 /100 WBC
NUCLEATED RED BLOOD CELLS: 0.9 /100 WBC
NUCLEATED RED BLOOD CELLS: 1.8 /100 WBC
O2 CONTENT: 12.5 ML/DL
O2 CONTENT: 12.8 ML/DL
O2 CONTENT: 12.9 ML/DL
O2 CONTENT: 13.5 ML/DL
O2 CONTENT: 13.8 ML/DL
O2 CONTENT: 13.9 ML/DL
O2 CONTENT: 14.2 ML/DL
O2 CONTENT: 14.9 ML/DL
O2 CONTENT: 9.2 ML/DL
O2 SATURATION: 91.9 % (ref 92–98.5)
O2 SATURATION: 93.1 % (ref 92–98.5)
O2 SATURATION: 93.7 % (ref 92–98.5)
O2 SATURATION: 94.2 % (ref 92–98.5)
O2 SATURATION: 95.1 % (ref 92–98.5)
O2 SATURATION: 95.2 % (ref 92–98.5)
O2 SATURATION: 95.2 % (ref 92–98.5)
O2 SATURATION: 95.3 % (ref 92–98.5)
O2 SATURATION: 96.9 % (ref 92–98.5)
O2 SATURATION: 98.6 % (ref 92–98.5)
O2HB: 90.6 % (ref 94–97)
O2HB: 92.4 % (ref 94–97)
O2HB: 92.9 % (ref 94–97)
O2HB: 94.1 % (ref 94–97)
O2HB: 94.2 % (ref 94–97)
O2HB: 94.3 % (ref 94–97)
O2HB: 94.6 % (ref 94–97)
O2HB: 96.2 % (ref 94–97)
O2HB: 97.7 % (ref 94–97)
OPERATOR ID: 1394
OPERATOR ID: 1926
OPERATOR ID: 2577
OPERATOR ID: 2577
OPERATOR ID: 2593
OPERATOR ID: 2962
OPERATOR ID: 377
OPERATOR ID: 797
OPERATOR ID: ABNORMAL
OPERATOR ID: ABNORMAL
ORGANISM: ABNORMAL
OSMOLALITY: 323 MOSM/KG (ref 285–310)
OVALOCYTES: ABNORMAL
PARAINFLUENZA VIRUS 1 BY PCR: NOT DETECTED
PARAINFLUENZA VIRUS 2 BY PCR: NOT DETECTED
PARAINFLUENZA VIRUS 3 BY PCR: NOT DETECTED
PARAINFLUENZA VIRUS 4 BY PCR: NOT DETECTED
PATIENT TEMP: 37
PATIENT TEMP: 37 C
PCO2 (TEMP CORRECTED): 31.6 MMHG (ref 35–45)
PCO2: 24.9 MMHG (ref 35–45)
PCO2: 34.4 MMHG (ref 35–45)
PCO2: 35.7 MMHG (ref 35–45)
PCO2: 36.8 MMHG (ref 35–45)
PCO2: 36.9 MMHG (ref 35–45)
PCO2: 37.3 MMHG (ref 35–45)
PCO2: 40.1 MMHG (ref 35–45)
PCO2: 49.5 MMHG (ref 35–45)
PCO2: 53.5 MMHG (ref 35–45)
PDW BLD-RTO: 17.5 FL (ref 11.5–15)
PDW BLD-RTO: 17.6 FL (ref 11.5–15)
PDW BLD-RTO: 17.7 FL (ref 11.5–15)
PDW BLD-RTO: 17.8 FL (ref 11.5–15)
PDW BLD-RTO: 17.9 FL (ref 11.5–15)
PDW BLD-RTO: 18.4 FL (ref 11.5–15)
PDW BLD-RTO: 18.6 FL (ref 11.5–15)
PDW BLD-RTO: 19.9 FL (ref 11.5–15)
PDW BLD-RTO: 20.7 FL (ref 11.5–15)
PDW BLD-RTO: 22.8 FL (ref 11.5–15)
PDW BLD-RTO: 23.2 FL (ref 11.5–15)
PDW BLD-RTO: 23.3 FL (ref 11.5–15)
PDW BLD-RTO: 23.4 FL (ref 11.5–15)
PDW BLD-RTO: 23.6 FL (ref 11.5–15)
PDW BLD-RTO: 23.9 FL (ref 11.5–15)
PEEP/CPAP: 5 CMH2O
PEEP/CPAP: 8 CMH2O
PFO2: 1.22 MMHG/%
PFO2: 1.44 MMHG/%
PFO2: 1.65 MMHG/%
PFO2: 1.74 MMHG/%
PFO2: 1.82 MMHG/%
PFO2: 1.89 MMHG/%
PFO2: 1.91 MMHG/%
PFO2: 1.96 MMHG/%
PH (TEMPERATURE CORRECTED): 7.28 (ref 7.35–7.45)
PH BLOOD GAS: 7.29 (ref 7.35–7.45)
PH BLOOD GAS: 7.3 (ref 7.35–7.45)
PH BLOOD GAS: 7.38 (ref 7.35–7.45)
PH BLOOD GAS: 7.4 (ref 7.35–7.45)
PH BLOOD GAS: 7.42 (ref 7.35–7.45)
PH BLOOD GAS: 7.46 (ref 7.35–7.45)
PH BLOOD GAS: 7.49 (ref 7.35–7.45)
PH BLOOD GAS: 7.5 (ref 7.35–7.45)
PH BLOOD GAS: 7.5 (ref 7.35–7.45)
PH UA: 7 (ref 5–9)
PHOSPHORUS: 4.4 MG/DL (ref 2.5–4.5)
PIP: 14 CMH2O
PLATELET # BLD: 13 E9/L (ref 130–450)
PLATELET # BLD: 15 E9/L (ref 130–450)
PLATELET # BLD: 18 E9/L (ref 130–450)
PLATELET # BLD: 27 E9/L (ref 130–450)
PLATELET # BLD: 31 E9/L (ref 130–450)
PLATELET # BLD: 32 E9/L (ref 130–450)
PLATELET # BLD: 38 E9/L (ref 130–450)
PLATELET # BLD: 51 E9/L (ref 130–450)
PLATELET # BLD: 74 E9/L (ref 130–450)
PLATELET # BLD: 80 E9/L (ref 130–450)
PLATELET # BLD: 80 E9/L (ref 130–450)
PLATELET # BLD: 86 E9/L (ref 130–450)
PLATELET # BLD: 87 E9/L (ref 130–450)
PLATELET # BLD: 88 E9/L (ref 130–450)
PLATELET # BLD: 88 E9/L (ref 130–450)
PLATELET # BLD: 94 E9/L (ref 130–450)
PLATELET # BLD: 98 E9/L (ref 130–450)
PLATELET CONFIRMATION: NORMAL
PMV BLD AUTO: 10.4 FL (ref 7–12)
PMV BLD AUTO: 10.6 FL (ref 7–12)
PMV BLD AUTO: 10.8 FL (ref 7–12)
PMV BLD AUTO: 11 FL (ref 7–12)
PMV BLD AUTO: 11.1 FL (ref 7–12)
PMV BLD AUTO: 11.1 FL (ref 7–12)
PMV BLD AUTO: 11.3 FL (ref 7–12)
PMV BLD AUTO: 11.4 FL (ref 7–12)
PMV BLD AUTO: 11.5 FL (ref 7–12)
PMV BLD AUTO: ABNORMAL FL (ref 7–12)
PO2 (TEMP CORRECTED): 74.8 MMHG (ref 60–80)
PO2: 152.8 MMHG (ref 75–100)
PO2: 61 MMHG (ref 75–100)
PO2: 69.6 MMHG (ref 75–100)
PO2: 72.7 MMHG (ref 75–100)
PO2: 78.5 MMHG (ref 75–100)
PO2: 78.6 MMHG (ref 75–100)
PO2: 79 MMHG (ref 75–100)
PO2: 82.6 MMHG (ref 75–100)
PO2: 94.3 MMHG (ref 75–100)
POIKILOCYTES: ABNORMAL
POLYCHROMASIA: ABNORMAL
POSITIVE END EXP PRESS: 5 CMH2O
POTASSIUM SERPL-SCNC: 2.9 MMOL/L (ref 3.5–5)
POTASSIUM SERPL-SCNC: 3.1 MMOL/L (ref 3.5–5)
POTASSIUM SERPL-SCNC: 3.3 MMOL/L (ref 3.5–5)
POTASSIUM SERPL-SCNC: 3.4 MMOL/L (ref 3.5–5)
POTASSIUM SERPL-SCNC: 3.6 MMOL/L (ref 3.5–5)
POTASSIUM SERPL-SCNC: 3.6 MMOL/L (ref 3.5–5)
POTASSIUM SERPL-SCNC: 3.7 MMOL/L (ref 3.5–5)
POTASSIUM SERPL-SCNC: 3.7 MMOL/L (ref 3.5–5)
POTASSIUM SERPL-SCNC: 3.8 MMOL/L (ref 3.5–5)
POTASSIUM SERPL-SCNC: 4 MMOL/L (ref 3.5–5)
POTASSIUM SERPL-SCNC: 4 MMOL/L (ref 3.5–5)
POTASSIUM SERPL-SCNC: 4.1 MMOL/L (ref 3.5–5)
POTASSIUM SERPL-SCNC: 4.2 MMOL/L (ref 3.5–5)
POTASSIUM SERPL-SCNC: 4.4 MMOL/L (ref 3.5–5)
POTASSIUM SERPL-SCNC: 4.4 MMOL/L (ref 3.5–5)
POTASSIUM SERPL-SCNC: 4.5 MMOL/L (ref 3.5–5)
POTASSIUM SERPL-SCNC: 4.6 MMOL/L (ref 3.5–5)
POTASSIUM SERPL-SCNC: 4.7 MMOL/L (ref 3.5–5)
POTASSIUM SERPL-SCNC: 4.9 MMOL/L (ref 3.5–5)
POTASSIUM SERPL-SCNC: 5 MMOL/L (ref 3.5–5)
PREALBUMIN: 12 MG/DL (ref 20–40)
PRO-BNP: 1851 PG/ML (ref 0–125)
PROCALCITONIN: 0.09 NG/ML (ref 0–0.08)
PROCALCITONIN: 0.11 NG/ML (ref 0–0.08)
PROCALCITONIN: 0.33 NG/ML (ref 0–0.08)
PROTEIN FLUID: 1.4 G/DL
PROTEIN UA: NEGATIVE MG/DL
PROTHROMBIN TIME: 12.8 SEC (ref 9.3–12.4)
PROTHROMBIN TIME: 13 SEC (ref 9.3–12.4)
PROTHROMBIN TIME: 13.4 SEC (ref 9.3–12.4)
RBC # BLD: 2.61 E12/L (ref 3.5–5.5)
RBC # BLD: 3.09 E12/L (ref 3.5–5.5)
RBC # BLD: 3.25 E12/L (ref 3.5–5.5)
RBC # BLD: 3.29 E12/L (ref 3.5–5.5)
RBC # BLD: 3.33 E12/L (ref 3.5–5.5)
RBC # BLD: 3.41 E12/L (ref 3.5–5.5)
RBC # BLD: 3.42 E12/L (ref 3.5–5.5)
RBC # BLD: 3.47 E12/L (ref 3.5–5.5)
RBC # BLD: 3.56 E12/L (ref 3.5–5.5)
RBC # BLD: 3.58 E12/L (ref 3.5–5.5)
RBC # BLD: 3.61 E12/L (ref 3.5–5.5)
RBC # BLD: 3.62 E12/L (ref 3.5–5.5)
RBC # BLD: 3.64 E12/L (ref 3.5–5.5)
RBC # BLD: 3.66 E12/L (ref 3.5–5.5)
RBC # BLD: 3.79 E12/L (ref 3.5–5.5)
RBC # BLD: 4.15 E12/L (ref 3.5–5.5)
RBC # BLD: 4.29 E12/L (ref 3.5–5.5)
RBC FLUID: <2000 /UL
RBC UA: ABNORMAL /HPF (ref 0–2)
REPORT: NORMAL
RESPIRATORY RATE: 14 B/MIN
RESPIRATORY SYNCYTIAL VIRUS BY PCR: NOT DETECTED
RI(T): 1.98
RI(T): 2.2
RI(T): 2.26
RI(T): 2.35
RI(T): 2.39
RI(T): 2.62
RI(T): 3.26
RI(T): 3.96
RR MECHANICAL: 14 B/MIN
RR MECHANICAL: 20 B/MIN
SARS-COV-2, NAAT: NOT DETECTED
SCHISTOCYTES: ABNORMAL
SCHISTOCYTES: ABNORMAL
SEDIMENTATION RATE, ERYTHROCYTE: 54 MM/HR (ref 0–20)
SMEAR, RESPIRATORY: ABNORMAL
SODIUM BLD-SCNC: 130 MMOL/L (ref 132–146)
SODIUM BLD-SCNC: 132 MMOL/L (ref 132–146)
SODIUM BLD-SCNC: 133 MMOL/L (ref 132–146)
SODIUM BLD-SCNC: 133 MMOL/L (ref 132–146)
SODIUM BLD-SCNC: 134 MMOL/L (ref 132–146)
SODIUM BLD-SCNC: 134 MMOL/L (ref 132–146)
SODIUM BLD-SCNC: 135 MMOL/L (ref 132–146)
SODIUM BLD-SCNC: 136 MMOL/L (ref 132–146)
SODIUM BLD-SCNC: 138 MMOL/L (ref 132–146)
SODIUM BLD-SCNC: 140 MMOL/L (ref 132–146)
SODIUM BLD-SCNC: 140 MMOL/L (ref 132–146)
SODIUM BLD-SCNC: 141 MMOL/L (ref 132–146)
SODIUM BLD-SCNC: 141 MMOL/L (ref 132–146)
SODIUM BLD-SCNC: 143 MMOL/L (ref 132–146)
SODIUM URINE: 50 MMOL/L
SOURCE, BLOOD GAS: ABNORMAL
SPECIFIC GRAVITY UA: 1.01 (ref 1–1.03)
STREP PNEUMONIAE ANTIGEN, URINE: NORMAL
TARGET CELLS: ABNORMAL
TEAR DROP CELLS: ABNORMAL
THB: 10.1 G/DL (ref 11.5–16.5)
THB: 10.1 G/DL (ref 11.5–16.5)
THB: 10.3 G/DL (ref 11.5–16.5)
THB: 10.4 G/DL (ref 11.5–16.5)
THB: 11.7 G/DL (ref 11.5–16.5)
THB: 7 G/DL (ref 11.5–16.5)
THB: 9.1 G/DL (ref 11.5–16.5)
THB: 9.6 G/DL (ref 11.5–16.5)
THB: 9.8 G/DL (ref 11.5–16.5)
THIS TEST SENT TO: NORMAL
TIDAL VOLUME: 480 ML
TIME ANALYZED: 1726
TIME ANALYZED: 2011
TIME ANALYZED: 433
TIME ANALYZED: 441
TIME ANALYZED: 450
TIME ANALYZED: 455
TIME ANALYZED: 504
TIME ANALYZED: 524
TIME ANALYZED: 538
TOTAL CK: 1007 U/L (ref 20–180)
TOTAL CK: 2449 U/L (ref 20–180)
TOTAL CK: 326 U/L (ref 20–180)
TOTAL IRON BINDING CAPACITY: 123 MCG/DL (ref 250–450)
TOTAL PROTEIN: 5.1 G/DL (ref 6.4–8.3)
TOTAL PROTEIN: 5.3 G/DL (ref 6.4–8.3)
TOTAL PROTEIN: 5.6 G/DL (ref 6.4–8.3)
TOTAL PROTEIN: 5.8 G/DL (ref 6.4–8.3)
TOTAL PROTEIN: 6.1 G/DL (ref 6.4–8.3)
TOTAL PROTEIN: 6.3 G/DL (ref 6.4–8.3)
TOTAL PROTEIN: 6.3 G/DL (ref 6.4–8.3)
TOTAL PROTEIN: 6.7 G/DL (ref 6.4–8.3)
TOTAL PROTEIN: 6.8 G/DL (ref 6.4–8.3)
TRIGL SERPL-MCNC: 119 MG/DL (ref 0–149)
TROPONIN: 0.02 NG/ML (ref 0–0.03)
TROPONIN: 0.02 NG/ML (ref 0–0.03)
TSH SERPL DL<=0.05 MIU/L-ACNC: 8.43 UIU/ML (ref 0.27–4.2)
URINE CULTURE, ROUTINE: NORMAL
UROBILINOGEN, URINE: 0.2 E.U./DL
VANCOMYCIN RANDOM: 28.2 MCG/ML (ref 5–40)
VANCOMYCIN RANDOM: 28.6 MCG/ML (ref 5–40)
VT MECHANICAL: 450 ML
VT MECHANICAL: 450 ML
VT MECHANICAL: 480 ML
WBC # BLD: 10.9 E9/L (ref 4.5–11.5)
WBC # BLD: 16.4 E9/L (ref 4.5–11.5)
WBC # BLD: 16.6 E9/L (ref 4.5–11.5)
WBC # BLD: 16.6 E9/L (ref 4.5–11.5)
WBC # BLD: 17.6 E9/L (ref 4.5–11.5)
WBC # BLD: 2.3 E9/L (ref 4.5–11.5)
WBC # BLD: 20.6 E9/L (ref 4.5–11.5)
WBC # BLD: 3 E9/L (ref 4.5–11.5)
WBC # BLD: 3.1 E9/L (ref 4.5–11.5)
WBC # BLD: 3.4 E9/L (ref 4.5–11.5)
WBC # BLD: 3.8 E9/L (ref 4.5–11.5)
WBC # BLD: 3.9 E9/L (ref 4.5–11.5)
WBC # BLD: 5.2 E9/L (ref 4.5–11.5)
WBC # BLD: 5.2 E9/L (ref 4.5–11.5)
WBC # BLD: 5.3 E9/L (ref 4.5–11.5)
WBC # BLD: 6 E9/L (ref 4.5–11.5)
WBC # BLD: 9.1 E9/L (ref 4.5–11.5)
WBC UA: ABNORMAL /HPF (ref 0–5)

## 2020-01-01 PROCEDURE — 6370000000 HC RX 637 (ALT 250 FOR IP): Performed by: THORACIC SURGERY (CARDIOTHORACIC VASCULAR SURGERY)

## 2020-01-01 PROCEDURE — 2580000003 HC RX 258: Performed by: INTERNAL MEDICINE

## 2020-01-01 PROCEDURE — 82962 GLUCOSE BLOOD TEST: CPT

## 2020-01-01 PROCEDURE — 76770 US EXAM ABDO BACK WALL COMP: CPT

## 2020-01-01 PROCEDURE — 6370000000 HC RX 637 (ALT 250 FOR IP): Performed by: INTERNAL MEDICINE

## 2020-01-01 PROCEDURE — 85378 FIBRIN DEGRADE SEMIQUANT: CPT

## 2020-01-01 PROCEDURE — 2060000000 HC ICU INTERMEDIATE R&B

## 2020-01-01 PROCEDURE — 2500000003 HC RX 250 WO HCPCS: Performed by: NURSE ANESTHETIST, CERTIFIED REGISTERED

## 2020-01-01 PROCEDURE — 97535 SELF CARE MNGMENT TRAINING: CPT

## 2020-01-01 PROCEDURE — 90935 HEMODIALYSIS ONE EVALUATION: CPT

## 2020-01-01 PROCEDURE — 37799 UNLISTED PX VASCULAR SURGERY: CPT

## 2020-01-01 PROCEDURE — 6360000002 HC RX W HCPCS: Performed by: INTERNAL MEDICINE

## 2020-01-01 PROCEDURE — 86870 RBC ANTIBODY IDENTIFICATION: CPT

## 2020-01-01 PROCEDURE — 82550 ASSAY OF CK (CPK): CPT

## 2020-01-01 PROCEDURE — 92611 MOTION FLUOROSCOPY/SWALLOW: CPT | Performed by: SPEECH-LANGUAGE PATHOLOGIST

## 2020-01-01 PROCEDURE — 94668 MNPJ CHEST WALL SBSQ: CPT

## 2020-01-01 PROCEDURE — 94640 AIRWAY INHALATION TREATMENT: CPT

## 2020-01-01 PROCEDURE — 85014 HEMATOCRIT: CPT

## 2020-01-01 PROCEDURE — 84132 ASSAY OF SERUM POTASSIUM: CPT

## 2020-01-01 PROCEDURE — 84157 ASSAY OF PROTEIN OTHER: CPT

## 2020-01-01 PROCEDURE — 87075 CULTR BACTERIA EXCEPT BLOOD: CPT

## 2020-01-01 PROCEDURE — 82805 BLOOD GASES W/O2 SATURATION: CPT

## 2020-01-01 PROCEDURE — 0100U HC RESPIRPTHGN MULT REV TRANS & AMP PRB TECH 21 TRGT: CPT

## 2020-01-01 PROCEDURE — 93005 ELECTROCARDIOGRAM TRACING: CPT | Performed by: INTERNAL MEDICINE

## 2020-01-01 PROCEDURE — 82570 ASSAY OF URINE CREATININE: CPT

## 2020-01-01 PROCEDURE — 6360000002 HC RX W HCPCS

## 2020-01-01 PROCEDURE — 71045 X-RAY EXAM CHEST 1 VIEW: CPT

## 2020-01-01 PROCEDURE — 93010 ELECTROCARDIOGRAM REPORT: CPT | Performed by: INTERNAL MEDICINE

## 2020-01-01 PROCEDURE — 2500000003 HC RX 250 WO HCPCS: Performed by: INTERNAL MEDICINE

## 2020-01-01 PROCEDURE — 82150 ASSAY OF AMYLASE: CPT

## 2020-01-01 PROCEDURE — 80048 BASIC METABOLIC PNL TOTAL CA: CPT

## 2020-01-01 PROCEDURE — 97110 THERAPEUTIC EXERCISES: CPT

## 2020-01-01 PROCEDURE — 3700000001 HC ADD 15 MINUTES (ANESTHESIA): Performed by: THORACIC SURGERY (CARDIOTHORACIC VASCULAR SURGERY)

## 2020-01-01 PROCEDURE — 2580000003 HC RX 258

## 2020-01-01 PROCEDURE — 85025 COMPLETE CBC W/AUTO DIFF WBC: CPT

## 2020-01-01 PROCEDURE — 97161 PT EVAL LOW COMPLEX 20 MIN: CPT | Performed by: PHYSICAL THERAPIST

## 2020-01-01 PROCEDURE — 2000000000 HC ICU R&B

## 2020-01-01 PROCEDURE — 88305 TISSUE EXAM BY PATHOLOGIST: CPT

## 2020-01-01 PROCEDURE — C9113 INJ PANTOPRAZOLE SODIUM, VIA: HCPCS | Performed by: INTERNAL MEDICINE

## 2020-01-01 PROCEDURE — 36592 COLLECT BLOOD FROM PICC: CPT

## 2020-01-01 PROCEDURE — 80076 HEPATIC FUNCTION PANEL: CPT

## 2020-01-01 PROCEDURE — 6360000004 HC RX CONTRAST MEDICATION: Performed by: RADIOLOGY

## 2020-01-01 PROCEDURE — 87205 SMEAR GRAM STAIN: CPT

## 2020-01-01 PROCEDURE — 86902 BLOOD TYPE ANTIGEN DONOR EA: CPT

## 2020-01-01 PROCEDURE — 36415 COLL VENOUS BLD VENIPUNCTURE: CPT

## 2020-01-01 PROCEDURE — 86880 COOMBS TEST DIRECT: CPT

## 2020-01-01 PROCEDURE — P9035 PLATELET PHERES LEUKOREDUCED: HCPCS

## 2020-01-01 PROCEDURE — 87070 CULTURE OTHR SPECIMN AEROBIC: CPT

## 2020-01-01 PROCEDURE — 94003 VENT MGMT INPAT SUBQ DAY: CPT

## 2020-01-01 PROCEDURE — 5A1D70Z PERFORMANCE OF URINARY FILTRATION, INTERMITTENT, LESS THAN 6 HOURS PER DAY: ICD-10-PCS | Performed by: INTERNAL MEDICINE

## 2020-01-01 PROCEDURE — 0BJ08ZZ INSPECTION OF TRACHEOBRONCHIAL TREE, VIA NATURAL OR ARTIFICIAL OPENING ENDOSCOPIC: ICD-10-PCS | Performed by: INTERNAL MEDICINE

## 2020-01-01 PROCEDURE — 99221 1ST HOSP IP/OBS SF/LOW 40: CPT | Performed by: NURSE PRACTITIONER

## 2020-01-01 PROCEDURE — 2580000003 HC RX 258: Performed by: THORACIC SURGERY (CARDIOTHORACIC VASCULAR SURGERY)

## 2020-01-01 PROCEDURE — 6370000000 HC RX 637 (ALT 250 FOR IP): Performed by: SPECIALIST

## 2020-01-01 PROCEDURE — 80053 COMPREHEN METABOLIC PANEL: CPT

## 2020-01-01 PROCEDURE — 76705 ECHO EXAM OF ABDOMEN: CPT

## 2020-01-01 PROCEDURE — 97530 THERAPEUTIC ACTIVITIES: CPT

## 2020-01-01 PROCEDURE — 99233 SBSQ HOSP IP/OBS HIGH 50: CPT | Performed by: INTERNAL MEDICINE

## 2020-01-01 PROCEDURE — 2700000000 HC OXYGEN THERAPY PER DAY

## 2020-01-01 PROCEDURE — 99291 CRITICAL CARE FIRST HOUR: CPT | Performed by: INTERNAL MEDICINE

## 2020-01-01 PROCEDURE — 87116 MYCOBACTERIA CULTURE: CPT

## 2020-01-01 PROCEDURE — 87206 SMEAR FLUORESCENT/ACID STAI: CPT

## 2020-01-01 PROCEDURE — 85018 HEMOGLOBIN: CPT

## 2020-01-01 PROCEDURE — 2500000003 HC RX 250 WO HCPCS: Performed by: SPECIALIST

## 2020-01-01 PROCEDURE — 87015 SPECIMEN INFECT AGNT CONCNTJ: CPT

## 2020-01-01 PROCEDURE — 87450 HC DIRECT STREP B ANTIGEN: CPT

## 2020-01-01 PROCEDURE — 6360000002 HC RX W HCPCS: Performed by: THORACIC SURGERY (CARDIOTHORACIC VASCULAR SURGERY)

## 2020-01-01 PROCEDURE — 74230 X-RAY XM SWLNG FUNCJ C+: CPT

## 2020-01-01 PROCEDURE — 88112 CYTOPATH CELL ENHANCE TECH: CPT

## 2020-01-01 PROCEDURE — P9047 ALBUMIN (HUMAN), 25%, 50ML: HCPCS | Performed by: INTERNAL MEDICINE

## 2020-01-01 PROCEDURE — 86900 BLOOD TYPING SEROLOGIC ABO: CPT

## 2020-01-01 PROCEDURE — 92610 EVALUATE SWALLOWING FUNCTION: CPT | Performed by: SPEECH-LANGUAGE PATHOLOGIST

## 2020-01-01 PROCEDURE — 87081 CULTURE SCREEN ONLY: CPT

## 2020-01-01 PROCEDURE — 94660 CPAP INITIATION&MGMT: CPT

## 2020-01-01 PROCEDURE — 80202 ASSAY OF VANCOMYCIN: CPT

## 2020-01-01 PROCEDURE — 84100 ASSAY OF PHOSPHORUS: CPT

## 2020-01-01 PROCEDURE — 99222 1ST HOSP IP/OBS MODERATE 55: CPT | Performed by: INTERNAL MEDICINE

## 2020-01-01 PROCEDURE — 83615 LACTATE (LD) (LDH) ENZYME: CPT

## 2020-01-01 PROCEDURE — 87040 BLOOD CULTURE FOR BACTERIA: CPT

## 2020-01-01 PROCEDURE — 84134 ASSAY OF PREALBUMIN: CPT

## 2020-01-01 PROCEDURE — U0002 COVID-19 LAB TEST NON-CDC: HCPCS

## 2020-01-01 PROCEDURE — 82533 TOTAL CORTISOL: CPT

## 2020-01-01 PROCEDURE — 97530 THERAPEUTIC ACTIVITIES: CPT | Performed by: PHYSICAL THERAPIST

## 2020-01-01 PROCEDURE — 36556 INSERT NON-TUNNEL CV CATH: CPT

## 2020-01-01 PROCEDURE — 82947 ASSAY GLUCOSE BLOOD QUANT: CPT

## 2020-01-01 PROCEDURE — 2500000003 HC RX 250 WO HCPCS

## 2020-01-01 PROCEDURE — 97116 GAIT TRAINING THERAPY: CPT

## 2020-01-01 PROCEDURE — 83880 ASSAY OF NATRIURETIC PEPTIDE: CPT

## 2020-01-01 PROCEDURE — 86706 HEP B SURFACE ANTIBODY: CPT

## 2020-01-01 PROCEDURE — 87088 URINE BACTERIA CULTURE: CPT

## 2020-01-01 PROCEDURE — 80074 ACUTE HEPATITIS PANEL: CPT

## 2020-01-01 PROCEDURE — 86922 COMPATIBILITY TEST ANTIGLOB: CPT

## 2020-01-01 PROCEDURE — 6360000002 HC RX W HCPCS: Performed by: EMERGENCY MEDICINE

## 2020-01-01 PROCEDURE — 84145 PROCALCITONIN (PCT): CPT

## 2020-01-01 PROCEDURE — 84155 ASSAY OF PROTEIN SERUM: CPT

## 2020-01-01 PROCEDURE — 90935 HEMODIALYSIS ONE EVALUATION: CPT | Performed by: INTERNAL MEDICINE

## 2020-01-01 PROCEDURE — 36620 INSERTION CATHETER ARTERY: CPT

## 2020-01-01 PROCEDURE — 87102 FUNGUS ISOLATION CULTURE: CPT

## 2020-01-01 PROCEDURE — 1200000000 HC SEMI PRIVATE

## 2020-01-01 PROCEDURE — 83605 ASSAY OF LACTIC ACID: CPT

## 2020-01-01 PROCEDURE — 2580000003 HC RX 258: Performed by: NURSE ANESTHETIST, CERTIFIED REGISTERED

## 2020-01-01 PROCEDURE — 85027 COMPLETE CBC AUTOMATED: CPT

## 2020-01-01 PROCEDURE — 81001 URINALYSIS AUTO W/SCOPE: CPT

## 2020-01-01 PROCEDURE — 83930 ASSAY OF BLOOD OSMOLALITY: CPT

## 2020-01-01 PROCEDURE — P9016 RBC LEUKOCYTES REDUCED: HCPCS

## 2020-01-01 PROCEDURE — 2709999900 HC NON-CHARGEABLE SUPPLY: Performed by: THORACIC SURGERY (CARDIOTHORACIC VASCULAR SURGERY)

## 2020-01-01 PROCEDURE — 86901 BLOOD TYPING SEROLOGIC RH(D): CPT

## 2020-01-01 PROCEDURE — 84300 ASSAY OF URINE SODIUM: CPT

## 2020-01-01 PROCEDURE — 99223 1ST HOSP IP/OBS HIGH 75: CPT | Performed by: INTERNAL MEDICINE

## 2020-01-01 PROCEDURE — 85610 PROTHROMBIN TIME: CPT

## 2020-01-01 PROCEDURE — 84478 ASSAY OF TRIGLYCERIDES: CPT

## 2020-01-01 PROCEDURE — 7100000001 HC PACU RECOVERY - ADDTL 15 MIN: Performed by: THORACIC SURGERY (CARDIOTHORACIC VASCULAR SURGERY)

## 2020-01-01 PROCEDURE — 71275 CT ANGIOGRAPHY CHEST: CPT

## 2020-01-01 PROCEDURE — 6360000002 HC RX W HCPCS: Performed by: NURSE ANESTHETIST, CERTIFIED REGISTERED

## 2020-01-01 PROCEDURE — 86850 RBC ANTIBODY SCREEN: CPT

## 2020-01-01 PROCEDURE — 86140 C-REACTIVE PROTEIN: CPT

## 2020-01-01 PROCEDURE — C1729 CATH, DRAINAGE: HCPCS

## 2020-01-01 PROCEDURE — 36600 WITHDRAWAL OF ARTERIAL BLOOD: CPT

## 2020-01-01 PROCEDURE — 5A1955Z RESPIRATORY VENTILATION, GREATER THAN 96 CONSECUTIVE HOURS: ICD-10-PCS | Performed by: INTERNAL MEDICINE

## 2020-01-01 PROCEDURE — 83036 HEMOGLOBIN GLYCOSYLATED A1C: CPT

## 2020-01-01 PROCEDURE — 94002 VENT MGMT INPAT INIT DAY: CPT

## 2020-01-01 PROCEDURE — 99285 EMERGENCY DEPT VISIT HI MDM: CPT

## 2020-01-01 PROCEDURE — 82105 ALPHA-FETOPROTEIN SERUM: CPT

## 2020-01-01 PROCEDURE — 94664 DEMO&/EVAL PT USE INHALER: CPT

## 2020-01-01 PROCEDURE — 83550 IRON BINDING TEST: CPT

## 2020-01-01 PROCEDURE — 82728 ASSAY OF FERRITIN: CPT

## 2020-01-01 PROCEDURE — 32555 ASPIRATE PLEURA W/ IMAGING: CPT | Performed by: RADIOLOGY

## 2020-01-01 PROCEDURE — 84484 ASSAY OF TROPONIN QUANT: CPT

## 2020-01-01 PROCEDURE — 36430 TRANSFUSION BLD/BLD COMPNT: CPT

## 2020-01-01 PROCEDURE — 83735 ASSAY OF MAGNESIUM: CPT

## 2020-01-01 PROCEDURE — 93005 ELECTROCARDIOGRAM TRACING: CPT | Performed by: EMERGENCY MEDICINE

## 2020-01-01 PROCEDURE — 96365 THER/PROPH/DIAG IV INF INIT: CPT

## 2020-01-01 PROCEDURE — 3600000004 HC SURGERY LEVEL 4 BASE: Performed by: THORACIC SURGERY (CARDIOTHORACIC VASCULAR SURGERY)

## 2020-01-01 PROCEDURE — 2580000003 HC RX 258: Performed by: EMERGENCY MEDICINE

## 2020-01-01 PROCEDURE — 73610 X-RAY EXAM OF ANKLE: CPT

## 2020-01-01 PROCEDURE — 85651 RBC SED RATE NONAUTOMATED: CPT

## 2020-01-01 PROCEDURE — 97110 THERAPEUTIC EXERCISES: CPT | Performed by: PHYSICAL THERAPIST

## 2020-01-01 PROCEDURE — 84443 ASSAY THYROID STIM HORMONE: CPT

## 2020-01-01 PROCEDURE — 0BH17EZ INSERTION OF ENDOTRACHEAL AIRWAY INTO TRACHEA, VIA NATURAL OR ARTIFICIAL OPENING: ICD-10-PCS | Performed by: INTERNAL MEDICINE

## 2020-01-01 PROCEDURE — 93971 EXTREMITY STUDY: CPT

## 2020-01-01 PROCEDURE — 3600000014 HC SURGERY LEVEL 4 ADDTL 15MIN: Performed by: THORACIC SURGERY (CARDIOTHORACIC VASCULAR SURGERY)

## 2020-01-01 PROCEDURE — 0W9930Z DRAINAGE OF RIGHT PLEURAL CAVITY WITH DRAINAGE DEVICE, PERCUTANEOUS APPROACH: ICD-10-PCS | Performed by: INTERNAL MEDICINE

## 2020-01-01 PROCEDURE — 97166 OT EVAL MOD COMPLEX 45 MIN: CPT

## 2020-01-01 PROCEDURE — 82803 BLOOD GASES ANY COMBINATION: CPT

## 2020-01-01 PROCEDURE — 3700000000 HC ANESTHESIA ATTENDED CARE: Performed by: THORACIC SURGERY (CARDIOTHORACIC VASCULAR SURGERY)

## 2020-01-01 PROCEDURE — 89051 BODY FLUID CELL COUNT: CPT

## 2020-01-01 PROCEDURE — 7100000000 HC PACU RECOVERY - FIRST 15 MIN: Performed by: THORACIC SURGERY (CARDIOTHORACIC VASCULAR SURGERY)

## 2020-01-01 PROCEDURE — 74220 X-RAY XM ESOPHAGUS 1CNTRST: CPT

## 2020-01-01 PROCEDURE — 83540 ASSAY OF IRON: CPT

## 2020-01-01 PROCEDURE — 73700 CT LOWER EXTREMITY W/O DYE: CPT

## 2020-01-01 PROCEDURE — 83520 IMMUNOASSAY QUANT NOS NONAB: CPT

## 2020-01-01 PROCEDURE — 3E0L4GC INTRODUCTION OF OTHER THERAPEUTIC SUBSTANCE INTO PLEURAL CAVITY, PERCUTANEOUS ENDOSCOPIC APPROACH: ICD-10-PCS | Performed by: INTERNAL MEDICINE

## 2020-01-01 PROCEDURE — 94667 MNPJ CHEST WALL 1ST: CPT

## 2020-01-01 RX ORDER — ROSUVASTATIN CALCIUM 10 MG/1
10 TABLET, COATED ORAL DAILY
Status: DISCONTINUED | OUTPATIENT
Start: 2020-01-01 | End: 2020-01-01

## 2020-01-01 RX ORDER — FUROSEMIDE 10 MG/ML
20 INJECTION INTRAMUSCULAR; INTRAVENOUS DAILY
Status: DISCONTINUED | OUTPATIENT
Start: 2020-01-01 | End: 2020-01-01

## 2020-01-01 RX ORDER — FENTANYL CITRATE 50 UG/ML
INJECTION, SOLUTION INTRAMUSCULAR; INTRAVENOUS
Status: DISPENSED
Start: 2020-01-01 | End: 2020-01-01

## 2020-01-01 RX ORDER — 0.9 % SODIUM CHLORIDE 0.9 %
20 INTRAVENOUS SOLUTION INTRAVENOUS ONCE
Status: DISCONTINUED | OUTPATIENT
Start: 2020-01-01 | End: 2020-01-01 | Stop reason: HOSPADM

## 2020-01-01 RX ORDER — LIDOCAINE HYDROCHLORIDE 10 MG/ML
INJECTION, SOLUTION INFILTRATION; PERINEURAL
Status: DISPENSED
Start: 2020-01-01 | End: 2020-01-01

## 2020-01-01 RX ORDER — HYDROCODONE BITARTRATE AND ACETAMINOPHEN 5; 325 MG/1; MG/1
1 TABLET ORAL EVERY 6 HOURS PRN
Status: DISCONTINUED | OUTPATIENT
Start: 2020-01-01 | End: 2020-01-01 | Stop reason: HOSPADM

## 2020-01-01 RX ORDER — MIDAZOLAM HYDROCHLORIDE 1 MG/ML
INJECTION INTRAMUSCULAR; INTRAVENOUS
Status: COMPLETED
Start: 2020-01-01 | End: 2020-01-01

## 2020-01-01 RX ORDER — FUROSEMIDE 40 MG/1
40 TABLET ORAL DAILY
Status: DISCONTINUED | OUTPATIENT
Start: 2020-01-01 | End: 2020-01-01

## 2020-01-01 RX ORDER — GUAIFENESIN 600 MG/1
1200 TABLET, EXTENDED RELEASE ORAL 2 TIMES DAILY
Status: DISPENSED | OUTPATIENT
Start: 2020-01-01 | End: 2020-01-01

## 2020-01-01 RX ORDER — SPIRONOLACTONE 25 MG/1
50 TABLET ORAL DAILY
Status: DISCONTINUED | OUTPATIENT
Start: 2020-01-01 | End: 2020-01-01

## 2020-01-01 RX ORDER — PROPOFOL 10 MG/ML
INJECTION, EMULSION INTRAVENOUS PRN
Status: DISCONTINUED | OUTPATIENT
Start: 2020-01-01 | End: 2020-01-01 | Stop reason: SDUPTHER

## 2020-01-01 RX ORDER — DEXTROSE MONOHYDRATE 50 MG/ML
100 INJECTION, SOLUTION INTRAVENOUS PRN
Status: DISCONTINUED | OUTPATIENT
Start: 2020-01-01 | End: 2020-01-01 | Stop reason: HOSPADM

## 2020-01-01 RX ORDER — NADOLOL 20 MG/1
10 TABLET ORAL DAILY
Status: DISCONTINUED | OUTPATIENT
Start: 2020-01-01 | End: 2020-01-01 | Stop reason: HOSPADM

## 2020-01-01 RX ORDER — MONTELUKAST SODIUM 4 MG/1
1 TABLET, CHEWABLE ORAL 2 TIMES DAILY
COMMUNITY

## 2020-01-01 RX ORDER — MIDAZOLAM HYDROCHLORIDE 1 MG/ML
2 INJECTION INTRAMUSCULAR; INTRAVENOUS ONCE
Status: COMPLETED | OUTPATIENT
Start: 2020-01-01 | End: 2020-01-01

## 2020-01-01 RX ORDER — HYDROXYCHLOROQUINE SULFATE 200 MG/1
200 TABLET, FILM COATED ORAL EVERY 12 HOURS
Status: DISCONTINUED | OUTPATIENT
Start: 2020-01-01 | End: 2020-01-01

## 2020-01-01 RX ORDER — FUROSEMIDE 20 MG/1
20 TABLET ORAL DAILY
Status: DISCONTINUED | OUTPATIENT
Start: 2020-01-01 | End: 2020-01-01

## 2020-01-01 RX ORDER — ONDANSETRON 2 MG/ML
INJECTION INTRAMUSCULAR; INTRAVENOUS PRN
Status: DISCONTINUED | OUTPATIENT
Start: 2020-01-01 | End: 2020-01-01 | Stop reason: SDUPTHER

## 2020-01-01 RX ORDER — SODIUM CHLORIDE 0.9 % (FLUSH) 0.9 %
SYRINGE (ML) INJECTION
Status: COMPLETED
Start: 2020-01-01 | End: 2020-01-01

## 2020-01-01 RX ORDER — GABAPENTIN 100 MG/1
100 CAPSULE ORAL 3 TIMES DAILY
Status: DISCONTINUED | OUTPATIENT
Start: 2020-01-01 | End: 2020-01-01 | Stop reason: HOSPADM

## 2020-01-01 RX ORDER — ACETAMINOPHEN 650 MG/1
650 SUPPOSITORY RECTAL EVERY 6 HOURS PRN
Status: DISCONTINUED | OUTPATIENT
Start: 2020-01-01 | End: 2020-01-01 | Stop reason: HOSPADM

## 2020-01-01 RX ORDER — LACTULOSE 10 G/15ML
10 SOLUTION ORAL 2 TIMES DAILY
Status: DISCONTINUED | OUTPATIENT
Start: 2020-01-01 | End: 2020-01-01 | Stop reason: HOSPADM

## 2020-01-01 RX ORDER — MEPERIDINE HYDROCHLORIDE 25 MG/ML
12.5 INJECTION INTRAMUSCULAR; INTRAVENOUS; SUBCUTANEOUS EVERY 5 MIN PRN
Status: DISCONTINUED | OUTPATIENT
Start: 2020-01-01 | End: 2020-01-01 | Stop reason: HOSPADM

## 2020-01-01 RX ORDER — ROCURONIUM BROMIDE 10 MG/ML
INJECTION, SOLUTION INTRAVENOUS PRN
Status: DISCONTINUED | OUTPATIENT
Start: 2020-01-01 | End: 2020-01-01 | Stop reason: SDUPTHER

## 2020-01-01 RX ORDER — ZINC SULFATE 50(220)MG
50 CAPSULE ORAL DAILY
Qty: 30 CAPSULE | Refills: 3 | COMMUNITY
Start: 2020-01-01

## 2020-01-01 RX ORDER — LACTULOSE 10 G/15ML
10 SOLUTION ORAL 2 TIMES DAILY
Refills: 1 | DISCHARGE
Start: 2020-01-01

## 2020-01-01 RX ORDER — SODIUM CHLORIDE 9 MG/ML
INJECTION, SOLUTION INTRAVENOUS CONTINUOUS
Status: DISCONTINUED | OUTPATIENT
Start: 2020-01-01 | End: 2020-01-01

## 2020-01-01 RX ORDER — POTASSIUM CHLORIDE 29.8 MG/ML
20 INJECTION INTRAVENOUS ONCE
Status: COMPLETED | OUTPATIENT
Start: 2020-01-01 | End: 2020-01-01

## 2020-01-01 RX ORDER — LORAZEPAM 2 MG/ML
1 INJECTION INTRAMUSCULAR
Status: DISCONTINUED | OUTPATIENT
Start: 2020-01-01 | End: 2020-01-01 | Stop reason: HOSPADM

## 2020-01-01 RX ORDER — ZINC SULFATE 50(220)MG
220 CAPSULE ORAL DAILY
Qty: 30 CAPSULE | Refills: 2
Start: 2020-01-01 | End: 2020-01-01 | Stop reason: ALTCHOICE

## 2020-01-01 RX ORDER — FENTANYL CITRATE 50 UG/ML
150 INJECTION, SOLUTION INTRAMUSCULAR; INTRAVENOUS ONCE
Status: COMPLETED | OUTPATIENT
Start: 2020-01-01 | End: 2020-01-01

## 2020-01-01 RX ORDER — LIDOCAINE HYDROCHLORIDE 20 MG/ML
INJECTION, SOLUTION INTRAVENOUS PRN
Status: DISCONTINUED | OUTPATIENT
Start: 2020-01-01 | End: 2020-01-01 | Stop reason: SDUPTHER

## 2020-01-01 RX ORDER — SPIRONOLACTONE 25 MG/1
100 TABLET ORAL DAILY
Status: DISCONTINUED | OUTPATIENT
Start: 2020-01-01 | End: 2020-01-01

## 2020-01-01 RX ORDER — MORPHINE SULFATE 4 MG/ML
2 INJECTION, SOLUTION INTRAMUSCULAR; INTRAVENOUS EVERY 30 MIN PRN
Status: DISCONTINUED | OUTPATIENT
Start: 2020-01-01 | End: 2020-01-01 | Stop reason: HOSPADM

## 2020-01-01 RX ORDER — PROPOFOL 10 MG/ML
10 INJECTION, EMULSION INTRAVENOUS
Status: DISCONTINUED | OUTPATIENT
Start: 2020-01-01 | End: 2020-01-01 | Stop reason: HOSPADM

## 2020-01-01 RX ORDER — DEXTROSE MONOHYDRATE 25 G/50ML
12.5 INJECTION, SOLUTION INTRAVENOUS PRN
Status: DISCONTINUED | OUTPATIENT
Start: 2020-01-01 | End: 2020-01-01 | Stop reason: HOSPADM

## 2020-01-01 RX ORDER — LABETALOL HYDROCHLORIDE 5 MG/ML
5 INJECTION, SOLUTION INTRAVENOUS EVERY 10 MIN PRN
Status: DISCONTINUED | OUTPATIENT
Start: 2020-01-01 | End: 2020-01-01 | Stop reason: HOSPADM

## 2020-01-01 RX ORDER — 0.9 % SODIUM CHLORIDE 0.9 %
20 INTRAVENOUS SOLUTION INTRAVENOUS ONCE
Status: COMPLETED | OUTPATIENT
Start: 2020-01-01 | End: 2020-01-01

## 2020-01-01 RX ORDER — HYDROXYCHLOROQUINE SULFATE 200 MG/1
400 TABLET, FILM COATED ORAL 2 TIMES DAILY
Status: COMPLETED | OUTPATIENT
Start: 2020-01-01 | End: 2020-01-01

## 2020-01-01 RX ORDER — LEVOFLOXACIN 750 MG/1
750 TABLET ORAL DAILY
Qty: 10 TABLET | Refills: 0 | Status: SHIPPED | OUTPATIENT
Start: 2020-01-01 | End: 2020-01-01

## 2020-01-01 RX ORDER — MIDAZOLAM HYDROCHLORIDE 1 MG/ML
1 INJECTION INTRAMUSCULAR; INTRAVENOUS ONCE
Status: DISCONTINUED | OUTPATIENT
Start: 2020-01-01 | End: 2020-01-01 | Stop reason: HOSPADM

## 2020-01-01 RX ORDER — ZINC SULFATE 50(220)MG
220 CAPSULE ORAL DAILY
Qty: 30 CAPSULE | Refills: 2 | COMMUNITY
Start: 2020-01-01 | End: 2020-01-01 | Stop reason: HOSPADM

## 2020-01-01 RX ORDER — ANTICOAGULANT SODIUM CITRATE SOLUTION 4 G/100ML
4 SOLUTION INTRAVENOUS DAILY PRN
Status: DISCONTINUED | OUTPATIENT
Start: 2020-01-01 | End: 2020-01-01 | Stop reason: HOSPADM

## 2020-01-01 RX ORDER — ALBUMIN (HUMAN) 12.5 G/50ML
25 SOLUTION INTRAVENOUS ONCE
Status: COMPLETED | OUTPATIENT
Start: 2020-01-01 | End: 2020-01-01

## 2020-01-01 RX ORDER — AZITHROMYCIN 250 MG/1
250 TABLET, FILM COATED ORAL DAILY
Status: DISPENSED | OUTPATIENT
Start: 2020-01-01 | End: 2020-01-01

## 2020-01-01 RX ORDER — DEXAMETHASONE SODIUM PHOSPHATE 10 MG/ML
INJECTION, SOLUTION INTRAMUSCULAR; INTRAVENOUS PRN
Status: DISCONTINUED | OUTPATIENT
Start: 2020-01-01 | End: 2020-01-01 | Stop reason: SDUPTHER

## 2020-01-01 RX ORDER — ACETAMINOPHEN 325 MG/1
650 TABLET ORAL EVERY 6 HOURS PRN
Status: DISCONTINUED | OUTPATIENT
Start: 2020-01-01 | End: 2020-01-01 | Stop reason: HOSPADM

## 2020-01-01 RX ORDER — SENNA AND DOCUSATE SODIUM 50; 8.6 MG/1; MG/1
2 TABLET, FILM COATED ORAL DAILY PRN
Status: DISCONTINUED | OUTPATIENT
Start: 2020-01-01 | End: 2020-01-01 | Stop reason: HOSPADM

## 2020-01-01 RX ORDER — LEVOFLOXACIN 750 MG/1
750 TABLET ORAL DAILY
Status: DISCONTINUED | OUTPATIENT
Start: 2020-01-01 | End: 2020-01-01 | Stop reason: HOSPADM

## 2020-01-01 RX ORDER — LEVOTHYROXINE SODIUM 0.05 MG/1
50 TABLET ORAL
Status: DISCONTINUED | OUTPATIENT
Start: 2020-01-01 | End: 2020-01-01 | Stop reason: HOSPADM

## 2020-01-01 RX ORDER — TRAZODONE HYDROCHLORIDE 50 MG/1
100 TABLET ORAL NIGHTLY
Status: DISCONTINUED | OUTPATIENT
Start: 2020-01-01 | End: 2020-01-01 | Stop reason: HOSPADM

## 2020-01-01 RX ORDER — PSEUDOEPHEDRINE HCL 30 MG
100 TABLET ORAL 2 TIMES DAILY
COMMUNITY
Start: 2020-01-01

## 2020-01-01 RX ORDER — NICOTINE POLACRILEX 4 MG
15 LOZENGE BUCCAL PRN
Status: DISCONTINUED | OUTPATIENT
Start: 2020-01-01 | End: 2020-01-01 | Stop reason: HOSPADM

## 2020-01-01 RX ORDER — SODIUM CHLORIDE 0.9 % (FLUSH) 0.9 %
SYRINGE (ML) INJECTION
Status: DISPENSED
Start: 2020-01-01 | End: 2020-01-01

## 2020-01-01 RX ORDER — HYDROMORPHONE HYDROCHLORIDE 1 MG/ML
0.25 INJECTION, SOLUTION INTRAMUSCULAR; INTRAVENOUS; SUBCUTANEOUS EVERY 5 MIN PRN
Status: DISCONTINUED | OUTPATIENT
Start: 2020-01-01 | End: 2020-01-01 | Stop reason: HOSPADM

## 2020-01-01 RX ORDER — SPIRONOLACTONE 25 MG/1
100 TABLET ORAL DAILY
Status: DISCONTINUED | OUTPATIENT
Start: 2020-01-01 | End: 2020-01-01 | Stop reason: HOSPADM

## 2020-01-01 RX ORDER — ALLOPURINOL 100 MG/1
100 TABLET ORAL DAILY
Status: DISCONTINUED | OUTPATIENT
Start: 2020-01-01 | End: 2020-01-01 | Stop reason: HOSPADM

## 2020-01-01 RX ORDER — ONDANSETRON 2 MG/ML
4 INJECTION INTRAMUSCULAR; INTRAVENOUS EVERY 6 HOURS PRN
Status: DISCONTINUED | OUTPATIENT
Start: 2020-01-01 | End: 2020-01-01 | Stop reason: HOSPADM

## 2020-01-01 RX ORDER — CHLORHEXIDINE GLUCONATE 0.12 MG/ML
15 RINSE ORAL 2 TIMES DAILY
Status: DISCONTINUED | OUTPATIENT
Start: 2020-01-01 | End: 2020-01-01 | Stop reason: HOSPADM

## 2020-01-01 RX ORDER — POTASSIUM CHLORIDE 29.8 MG/ML
20 INJECTION INTRAVENOUS
Status: COMPLETED | OUTPATIENT
Start: 2020-01-01 | End: 2020-01-01

## 2020-01-01 RX ORDER — HYDROCODONE BITARTRATE AND ACETAMINOPHEN 5; 325 MG/1; MG/1
1 TABLET ORAL EVERY 6 HOURS PRN
Qty: 12 TABLET | Refills: 0 | Status: SHIPPED | OUTPATIENT
Start: 2020-01-01 | End: 2020-01-01

## 2020-01-01 RX ORDER — MIDAZOLAM HYDROCHLORIDE 1 MG/ML
INJECTION INTRAMUSCULAR; INTRAVENOUS PRN
Status: DISCONTINUED | OUTPATIENT
Start: 2020-01-01 | End: 2020-01-01 | Stop reason: SDUPTHER

## 2020-01-01 RX ORDER — FUROSEMIDE 10 MG/ML
40 INJECTION INTRAMUSCULAR; INTRAVENOUS DAILY
Status: DISCONTINUED | OUTPATIENT
Start: 2020-01-01 | End: 2020-01-01

## 2020-01-01 RX ORDER — ZINC SULFATE 50(220)MG
50 CAPSULE ORAL DAILY
Status: DISCONTINUED | OUTPATIENT
Start: 2020-01-01 | End: 2020-01-01 | Stop reason: HOSPADM

## 2020-01-01 RX ORDER — SERTRALINE HYDROCHLORIDE 100 MG/1
100 TABLET, FILM COATED ORAL DAILY
Status: DISCONTINUED | OUTPATIENT
Start: 2020-01-01 | End: 2020-01-01 | Stop reason: HOSPADM

## 2020-01-01 RX ORDER — MORPHINE SULFATE 2 MG/ML
2 INJECTION, SOLUTION INTRAMUSCULAR; INTRAVENOUS EVERY 30 MIN PRN
Status: DISCONTINUED | OUTPATIENT
Start: 2020-01-01 | End: 2020-01-01 | Stop reason: SDUPTHER

## 2020-01-01 RX ORDER — METHYLPREDNISOLONE SODIUM SUCCINATE 125 MG/2ML
60 INJECTION, POWDER, LYOPHILIZED, FOR SOLUTION INTRAMUSCULAR; INTRAVENOUS EVERY 6 HOURS
Status: COMPLETED | OUTPATIENT
Start: 2020-01-01 | End: 2020-01-01

## 2020-01-01 RX ORDER — PANTOPRAZOLE SODIUM 40 MG/1
40 TABLET, DELAYED RELEASE ORAL 2 TIMES DAILY
Status: DISCONTINUED | OUTPATIENT
Start: 2020-01-01 | End: 2020-01-01 | Stop reason: HOSPADM

## 2020-01-01 RX ORDER — CALCIUM CARBONATE 200(500)MG
500 TABLET,CHEWABLE ORAL 3 TIMES DAILY PRN
COMMUNITY
Start: 2020-01-01 | End: 2020-05-30

## 2020-01-01 RX ORDER — CHOLESTYRAMINE LIGHT 4 G/5.7G
4 POWDER, FOR SUSPENSION ORAL DAILY
Status: DISCONTINUED | OUTPATIENT
Start: 2020-01-01 | End: 2020-01-01 | Stop reason: HOSPADM

## 2020-01-01 RX ORDER — DOCUSATE SODIUM 100 MG/1
100 CAPSULE, LIQUID FILLED ORAL 2 TIMES DAILY
Status: DISCONTINUED | OUTPATIENT
Start: 2020-01-01 | End: 2020-01-01 | Stop reason: HOSPADM

## 2020-01-01 RX ORDER — PANTOPRAZOLE SODIUM 40 MG/10ML
40 INJECTION, POWDER, LYOPHILIZED, FOR SOLUTION INTRAVENOUS 2 TIMES DAILY
Status: DISCONTINUED | OUTPATIENT
Start: 2020-01-01 | End: 2020-01-01 | Stop reason: HOSPADM

## 2020-01-01 RX ORDER — INSULIN GLARGINE 100 [IU]/ML
10 INJECTION, SOLUTION SUBCUTANEOUS 2 TIMES DAILY
Status: DISCONTINUED | OUTPATIENT
Start: 2020-01-01 | End: 2020-01-01 | Stop reason: HOSPADM

## 2020-01-01 RX ORDER — SODIUM CHLORIDE 9 MG/ML
INJECTION, SOLUTION INTRAVENOUS CONTINUOUS PRN
Status: DISCONTINUED | OUTPATIENT
Start: 2020-01-01 | End: 2020-01-01 | Stop reason: SDUPTHER

## 2020-01-01 RX ORDER — SODIUM CHLORIDE FOR INHALATION 3 %
4 VIAL, NEBULIZER (ML) INHALATION 2 TIMES DAILY
Status: DISPENSED | OUTPATIENT
Start: 2020-01-01 | End: 2020-01-01

## 2020-01-01 RX ORDER — FUROSEMIDE 20 MG/1
20 TABLET ORAL EVERY MORNING
Status: DISCONTINUED | OUTPATIENT
Start: 2020-01-01 | End: 2020-01-01

## 2020-01-01 RX ORDER — CALCIUM CARBONATE 200(500)MG
500 TABLET,CHEWABLE ORAL 3 TIMES DAILY PRN
Status: DISCONTINUED | OUTPATIENT
Start: 2020-01-01 | End: 2020-01-01 | Stop reason: HOSPADM

## 2020-01-01 RX ORDER — FUROSEMIDE 40 MG/1
40 TABLET ORAL DAILY
Status: DISCONTINUED | OUTPATIENT
Start: 2020-01-01 | End: 2020-01-01 | Stop reason: HOSPADM

## 2020-01-01 RX ORDER — ACETYLCYSTEINE 100 MG/ML
3 SOLUTION ORAL; RESPIRATORY (INHALATION) 2 TIMES DAILY
Status: DISCONTINUED | OUTPATIENT
Start: 2020-01-01 | End: 2020-01-01 | Stop reason: CLARIF

## 2020-01-01 RX ORDER — FUROSEMIDE 20 MG/1
20 TABLET ORAL ONCE
Status: COMPLETED | OUTPATIENT
Start: 2020-01-01 | End: 2020-01-01

## 2020-01-01 RX ORDER — SODIUM CHLORIDE 9 MG/ML
10 INJECTION INTRAVENOUS 2 TIMES DAILY
Status: DISCONTINUED | OUTPATIENT
Start: 2020-01-01 | End: 2020-01-01 | Stop reason: HOSPADM

## 2020-01-01 RX ORDER — HYDROMORPHONE HYDROCHLORIDE 1 MG/ML
0.5 INJECTION, SOLUTION INTRAMUSCULAR; INTRAVENOUS; SUBCUTANEOUS EVERY 5 MIN PRN
Status: DISCONTINUED | OUTPATIENT
Start: 2020-01-01 | End: 2020-01-01 | Stop reason: HOSPADM

## 2020-01-01 RX ORDER — ZINC SULFATE 50(220)MG
220 CAPSULE ORAL DAILY
Status: DISCONTINUED | OUTPATIENT
Start: 2020-01-01 | End: 2020-01-01 | Stop reason: CLARIF

## 2020-01-01 RX ORDER — SPIRONOLACTONE 100 MG/1
100 TABLET, FILM COATED ORAL DAILY
Qty: 30 TABLET | Refills: 3 | DISCHARGE
Start: 2020-01-01

## 2020-01-01 RX ORDER — FENTANYL CITRATE 50 UG/ML
INJECTION, SOLUTION INTRAMUSCULAR; INTRAVENOUS PRN
Status: DISCONTINUED | OUTPATIENT
Start: 2020-01-01 | End: 2020-01-01 | Stop reason: SDUPTHER

## 2020-01-01 RX ORDER — PROMETHAZINE HYDROCHLORIDE 25 MG/ML
25 INJECTION, SOLUTION INTRAMUSCULAR; INTRAVENOUS PRN
Status: DISCONTINUED | OUTPATIENT
Start: 2020-01-01 | End: 2020-01-01 | Stop reason: HOSPADM

## 2020-01-01 RX ORDER — 0.9 % SODIUM CHLORIDE 0.9 %
1000 INTRAVENOUS SOLUTION INTRAVENOUS ONCE
Status: DISCONTINUED | OUTPATIENT
Start: 2020-01-01 | End: 2020-01-01

## 2020-01-01 RX ORDER — FUROSEMIDE 10 MG/ML
20 INJECTION INTRAMUSCULAR; INTRAVENOUS ONCE
Status: COMPLETED | OUTPATIENT
Start: 2020-01-01 | End: 2020-01-01

## 2020-01-01 RX ORDER — LEVOTHYROXINE SODIUM 0.05 MG/1
50 TABLET ORAL
COMMUNITY

## 2020-01-01 RX ORDER — FUROSEMIDE 40 MG/1
40 TABLET ORAL EVERY MORNING
DISCHARGE
Start: 2020-01-01

## 2020-01-01 RX ADMIN — INSULIN LISPRO 1 UNITS: 100 INJECTION, SOLUTION INTRAVENOUS; SUBCUTANEOUS at 16:32

## 2020-01-01 RX ADMIN — METHYLPREDNISOLONE SODIUM SUCCINATE 60 MG: 125 INJECTION, POWDER, FOR SOLUTION INTRAMUSCULAR; INTRAVENOUS at 01:23

## 2020-01-01 RX ADMIN — PROPOFOL 20 MCG/KG/MIN: 10 INJECTION, EMULSION INTRAVENOUS at 08:49

## 2020-01-01 RX ADMIN — SODIUM BICARBONATE: 84 INJECTION, SOLUTION INTRAVENOUS at 10:12

## 2020-01-01 RX ADMIN — INSULIN LISPRO 2 UNITS: 100 INJECTION, SOLUTION INTRAVENOUS; SUBCUTANEOUS at 05:16

## 2020-01-01 RX ADMIN — ZINC SULFATE 220 MG (50 MG) CAPSULE 50 MG: CAPSULE at 09:08

## 2020-01-01 RX ADMIN — PANTOPRAZOLE SODIUM 40 MG: 40 TABLET, DELAYED RELEASE ORAL at 21:48

## 2020-01-01 RX ADMIN — INSULIN LISPRO 2 UNITS: 100 INJECTION, SOLUTION INTRAVENOUS; SUBCUTANEOUS at 16:25

## 2020-01-01 RX ADMIN — TRAZODONE HYDROCHLORIDE 100 MG: 50 TABLET ORAL at 21:25

## 2020-01-01 RX ADMIN — PROPOFOL 20 MCG/KG/MIN: 10 INJECTION, EMULSION INTRAVENOUS at 04:12

## 2020-01-01 RX ADMIN — TRAZODONE HYDROCHLORIDE 100 MG: 50 TABLET ORAL at 21:15

## 2020-01-01 RX ADMIN — PANTOPRAZOLE SODIUM 40 MG: 40 TABLET, DELAYED RELEASE ORAL at 08:35

## 2020-01-01 RX ADMIN — ALLOPURINOL 100 MG: 100 TABLET ORAL at 08:13

## 2020-01-01 RX ADMIN — PANTOPRAZOLE SODIUM 40 MG: 40 TABLET, DELAYED RELEASE ORAL at 08:13

## 2020-01-01 RX ADMIN — TRAZODONE HYDROCHLORIDE 100 MG: 50 TABLET ORAL at 21:57

## 2020-01-01 RX ADMIN — SPIRONOLACTONE 100 MG: 25 TABLET ORAL at 08:59

## 2020-01-01 RX ADMIN — FUROSEMIDE 40 MG: 40 TABLET ORAL at 09:13

## 2020-01-01 RX ADMIN — CHLORHEXIDINE GLUCONATE 0.12% ORAL RINSE 15 ML: 1.2 LIQUID ORAL at 20:00

## 2020-01-01 RX ADMIN — Medication 1.5 G: at 18:52

## 2020-01-01 RX ADMIN — INSULIN LISPRO 1 UNITS: 100 INJECTION, SOLUTION INTRAVENOUS; SUBCUTANEOUS at 08:15

## 2020-01-01 RX ADMIN — PANTOPRAZOLE SODIUM 40 MG: 40 INJECTION, POWDER, FOR SOLUTION INTRAVENOUS at 08:44

## 2020-01-01 RX ADMIN — WATER 10 ML: 1 INJECTION INTRAMUSCULAR; INTRAVENOUS; SUBCUTANEOUS at 17:14

## 2020-01-01 RX ADMIN — DOCUSATE SODIUM 100 MG: 100 CAPSULE, LIQUID FILLED ORAL at 00:23

## 2020-01-01 RX ADMIN — GUAIFENESIN 1200 MG: 600 TABLET, EXTENDED RELEASE ORAL at 10:04

## 2020-01-01 RX ADMIN — LEVOTHYROXINE SODIUM 50 MCG: 50 TABLET ORAL at 08:14

## 2020-01-01 RX ADMIN — SODIUM CHLORIDE: 9 INJECTION, SOLUTION INTRAVENOUS at 03:09

## 2020-01-01 RX ADMIN — ANTICOAGULANT SODIUM CITRATE SOLUTION: 4 SOLUTION INTRAVENOUS at 17:09

## 2020-01-01 RX ADMIN — CHLORHEXIDINE GLUCONATE 0.12% ORAL RINSE 15 ML: 1.2 LIQUID ORAL at 19:51

## 2020-01-01 RX ADMIN — ALLOPURINOL 100 MG: 100 TABLET ORAL at 08:36

## 2020-01-01 RX ADMIN — INSULIN LISPRO 1 UNITS: 100 INJECTION, SOLUTION INTRAVENOUS; SUBCUTANEOUS at 20:46

## 2020-01-01 RX ADMIN — FAMOTIDINE 20 MG: 10 INJECTION INTRAVENOUS at 08:40

## 2020-01-01 RX ADMIN — SODIUM CHLORIDE, PRESERVATIVE FREE 10 ML: 5 INJECTION INTRAVENOUS at 06:08

## 2020-01-01 RX ADMIN — DOCUSATE SODIUM 100 MG: 100 CAPSULE, LIQUID FILLED ORAL at 08:36

## 2020-01-01 RX ADMIN — LEVOFLOXACIN 750 MG: 750 TABLET, FILM COATED ORAL at 09:08

## 2020-01-01 RX ADMIN — ALLOPURINOL 100 MG: 100 TABLET ORAL at 08:53

## 2020-01-01 RX ADMIN — INSULIN LISPRO 1 UNITS: 100 INJECTION, SOLUTION INTRAVENOUS; SUBCUTANEOUS at 20:00

## 2020-01-01 RX ADMIN — BARIUM SULFATE 45 ML: 400 SUSPENSION ORAL at 13:34

## 2020-01-01 RX ADMIN — HYDROCORTISONE SODIUM SUCCINATE 100 MG: 100 INJECTION, POWDER, FOR SOLUTION INTRAMUSCULAR; INTRAVENOUS at 04:41

## 2020-01-01 RX ADMIN — LACTULOSE 10 G: 10 SOLUTION ORAL at 00:24

## 2020-01-01 RX ADMIN — PANTOPRAZOLE SODIUM 40 MG: 40 TABLET, DELAYED RELEASE ORAL at 22:26

## 2020-01-01 RX ADMIN — CHLORHEXIDINE GLUCONATE 0.12% ORAL RINSE 15 ML: 1.2 LIQUID ORAL at 08:34

## 2020-01-01 RX ADMIN — INSULIN LISPRO 2 UNITS: 100 INJECTION, SOLUTION INTRAVENOUS; SUBCUTANEOUS at 21:00

## 2020-01-01 RX ADMIN — SERTRALINE HYDROCHLORIDE 100 MG: 100 TABLET ORAL at 09:18

## 2020-01-01 RX ADMIN — PROPOFOL 25 MCG/KG/MIN: 10 INJECTION, EMULSION INTRAVENOUS at 06:52

## 2020-01-01 RX ADMIN — CHOLESTYRAMINE 4 G: 4 POWDER, FOR SUSPENSION ORAL at 08:35

## 2020-01-01 RX ADMIN — ENOXAPARIN SODIUM 40 MG: 40 INJECTION SUBCUTANEOUS at 09:25

## 2020-01-01 RX ADMIN — INSULIN LISPRO 1 UNITS: 100 INJECTION, SOLUTION INTRAVENOUS; SUBCUTANEOUS at 16:12

## 2020-01-01 RX ADMIN — ALLOPURINOL 100 MG: 100 TABLET ORAL at 08:52

## 2020-01-01 RX ADMIN — BARIUM SULFATE 88 G: 960 POWDER, FOR SUSPENSION ORAL at 14:23

## 2020-01-01 RX ADMIN — DOCUSATE SODIUM 100 MG: 100 CAPSULE, LIQUID FILLED ORAL at 21:57

## 2020-01-01 RX ADMIN — PIPERACILLIN AND TAZOBACTAM 3.38 G: 3; .375 INJECTION, POWDER, FOR SOLUTION INTRAVENOUS at 19:28

## 2020-01-01 RX ADMIN — SODIUM BICARBONATE: 84 INJECTION, SOLUTION INTRAVENOUS at 22:53

## 2020-01-01 RX ADMIN — GABAPENTIN 100 MG: 100 CAPSULE ORAL at 21:48

## 2020-01-01 RX ADMIN — INSULIN LISPRO 3 UNITS: 100 INJECTION, SOLUTION INTRAVENOUS; SUBCUTANEOUS at 08:32

## 2020-01-01 RX ADMIN — PANTOPRAZOLE SODIUM 40 MG: 40 TABLET, DELAYED RELEASE ORAL at 09:07

## 2020-01-01 RX ADMIN — FUROSEMIDE 40 MG: 40 TABLET ORAL at 09:04

## 2020-01-01 RX ADMIN — SPIRONOLACTONE 100 MG: 25 TABLET ORAL at 09:12

## 2020-01-01 RX ADMIN — METHYLPREDNISOLONE SODIUM SUCCINATE 60 MG: 125 INJECTION, POWDER, FOR SOLUTION INTRAMUSCULAR; INTRAVENOUS at 07:54

## 2020-01-01 RX ADMIN — CHLORHEXIDINE GLUCONATE 0.12% ORAL RINSE 15 ML: 1.2 LIQUID ORAL at 21:05

## 2020-01-01 RX ADMIN — SPIRONOLACTONE 50 MG: 25 TABLET ORAL at 09:26

## 2020-01-01 RX ADMIN — PROPOFOL 10 MCG/KG/MIN: 10 INJECTION, EMULSION INTRAVENOUS at 05:03

## 2020-01-01 RX ADMIN — ZINC SULFATE 220 MG (50 MG) CAPSULE 50 MG: CAPSULE at 11:57

## 2020-01-01 RX ADMIN — LEVOFLOXACIN 750 MG: 750 TABLET, FILM COATED ORAL at 09:25

## 2020-01-01 RX ADMIN — ALLOPURINOL 100 MG: 100 TABLET ORAL at 09:25

## 2020-01-01 RX ADMIN — INSULIN LISPRO 3 UNITS: 100 INJECTION, SOLUTION INTRAVENOUS; SUBCUTANEOUS at 16:24

## 2020-01-01 RX ADMIN — INSULIN LISPRO 1 UNITS: 100 INJECTION, SOLUTION INTRAVENOUS; SUBCUTANEOUS at 11:50

## 2020-01-01 RX ADMIN — INSULIN LISPRO 1 UNITS: 100 INJECTION, SOLUTION INTRAVENOUS; SUBCUTANEOUS at 11:53

## 2020-01-01 RX ADMIN — SODIUM CHLORIDE: 9 INJECTION, SOLUTION INTRAVENOUS at 21:23

## 2020-01-01 RX ADMIN — TRAZODONE HYDROCHLORIDE 100 MG: 50 TABLET ORAL at 21:48

## 2020-01-01 RX ADMIN — PIPERACILLIN AND TAZOBACTAM 3.38 G: 3; .375 INJECTION, POWDER, FOR SOLUTION INTRAVENOUS at 18:52

## 2020-01-01 RX ADMIN — HYDROCORTISONE SODIUM SUCCINATE 100 MG: 100 INJECTION, POWDER, FOR SOLUTION INTRAMUSCULAR; INTRAVENOUS at 21:47

## 2020-01-01 RX ADMIN — DOCUSATE SODIUM 100 MG: 100 CAPSULE, LIQUID FILLED ORAL at 21:15

## 2020-01-01 RX ADMIN — DOCUSATE SODIUM 100 MG: 100 CAPSULE, LIQUID FILLED ORAL at 20:46

## 2020-01-01 RX ADMIN — GABAPENTIN 100 MG: 100 CAPSULE ORAL at 21:07

## 2020-01-01 RX ADMIN — PANTOPRAZOLE SODIUM 40 MG: 40 TABLET, DELAYED RELEASE ORAL at 09:12

## 2020-01-01 RX ADMIN — LEVOTHYROXINE SODIUM 50 MCG: 50 TABLET ORAL at 05:58

## 2020-01-01 RX ADMIN — NADOLOL 10 MG: 20 TABLET ORAL at 09:04

## 2020-01-01 RX ADMIN — HYDROCODONE BITARTRATE AND ACETAMINOPHEN 1 TABLET: 5; 325 TABLET ORAL at 09:11

## 2020-01-01 RX ADMIN — FUROSEMIDE 20 MG: 20 TABLET ORAL at 08:52

## 2020-01-01 RX ADMIN — GABAPENTIN 100 MG: 100 CAPSULE ORAL at 20:16

## 2020-01-01 RX ADMIN — HYDROXYCHLOROQUINE SULFATE 400 MG: 200 TABLET, FILM COATED ORAL at 20:17

## 2020-01-01 RX ADMIN — PANTOPRAZOLE SODIUM 40 MG: 40 INJECTION, POWDER, FOR SOLUTION INTRAVENOUS at 21:46

## 2020-01-01 RX ADMIN — FUROSEMIDE 20 MG: 20 TABLET ORAL at 09:07

## 2020-01-01 RX ADMIN — FUROSEMIDE 20 MG: 20 TABLET ORAL at 08:37

## 2020-01-01 RX ADMIN — FUROSEMIDE 20 MG: 20 TABLET ORAL at 08:14

## 2020-01-01 RX ADMIN — PIPERACILLIN AND TAZOBACTAM 3.38 G: 3; .375 INJECTION, POWDER, FOR SOLUTION INTRAVENOUS at 18:39

## 2020-01-01 RX ADMIN — HYDROCORTISONE SODIUM SUCCINATE 100 MG: 100 INJECTION, POWDER, FOR SOLUTION INTRAMUSCULAR; INTRAVENOUS at 20:56

## 2020-01-01 RX ADMIN — SODIUM CHLORIDE, PRESERVATIVE FREE 10 ML: 5 INJECTION INTRAVENOUS at 20:00

## 2020-01-01 RX ADMIN — GABAPENTIN 100 MG: 100 CAPSULE ORAL at 23:29

## 2020-01-01 RX ADMIN — NADOLOL 10 MG: 20 TABLET ORAL at 08:53

## 2020-01-01 RX ADMIN — SODIUM CHLORIDE, PRESERVATIVE FREE 10 ML: 5 INJECTION INTRAVENOUS at 08:23

## 2020-01-01 RX ADMIN — LACTULOSE 10 G: 10 SOLUTION ORAL at 08:58

## 2020-01-01 RX ADMIN — INSULIN LISPRO 2 UNITS: 100 INJECTION, SOLUTION INTRAVENOUS; SUBCUTANEOUS at 09:00

## 2020-01-01 RX ADMIN — LEVOFLOXACIN 750 MG: 750 TABLET, FILM COATED ORAL at 10:03

## 2020-01-01 RX ADMIN — CHOLESTYRAMINE 4 G: 4 POWDER, FOR SUSPENSION ORAL at 11:55

## 2020-01-01 RX ADMIN — SPIRONOLACTONE 50 MG: 25 TABLET ORAL at 08:52

## 2020-01-01 RX ADMIN — SERTRALINE HYDROCHLORIDE 100 MG: 100 TABLET ORAL at 09:07

## 2020-01-01 RX ADMIN — SPIRONOLACTONE 50 MG: 25 TABLET ORAL at 08:14

## 2020-01-01 RX ADMIN — LACTULOSE 10 G: 10 SOLUTION ORAL at 21:40

## 2020-01-01 RX ADMIN — Medication 4 MCG/MIN: at 04:24

## 2020-01-01 RX ADMIN — GABAPENTIN 100 MG: 100 CAPSULE ORAL at 22:26

## 2020-01-01 RX ADMIN — FUROSEMIDE 20 MG: 20 TABLET ORAL at 13:55

## 2020-01-01 RX ADMIN — IOPAMIDOL 75 ML: 755 INJECTION, SOLUTION INTRAVENOUS at 11:04

## 2020-01-01 RX ADMIN — TRAZODONE HYDROCHLORIDE 100 MG: 50 TABLET ORAL at 20:17

## 2020-01-01 RX ADMIN — SODIUM CHLORIDE: 9 INJECTION, SOLUTION INTRAVENOUS at 08:51

## 2020-01-01 RX ADMIN — CHOLESTYRAMINE 4 G: 4 POWDER, FOR SUSPENSION ORAL at 11:57

## 2020-01-01 RX ADMIN — ALLOPURINOL 100 MG: 100 TABLET ORAL at 09:08

## 2020-01-01 RX ADMIN — LACTULOSE 10 G: 10 SOLUTION ORAL at 09:18

## 2020-01-01 RX ADMIN — SODIUM CHLORIDE SOLN NEBU 3% 4 ML: 3 NEBU SOLN at 18:00

## 2020-01-01 RX ADMIN — PANTOPRAZOLE SODIUM 40 MG: 40 INJECTION, POWDER, FOR SOLUTION INTRAVENOUS at 19:51

## 2020-01-01 RX ADMIN — SODIUM CHLORIDE, PRESERVATIVE FREE 10 ML: 5 INJECTION INTRAVENOUS at 13:57

## 2020-01-01 RX ADMIN — BARIUM SULFATE 45 G: 0.81 POWDER, FOR SUSPENSION ORAL at 13:34

## 2020-01-01 RX ADMIN — BARIUM SULFATE 45 G: 0.6 CREAM ORAL at 13:33

## 2020-01-01 RX ADMIN — GUAIFENESIN 1200 MG: 600 TABLET, EXTENDED RELEASE ORAL at 08:52

## 2020-01-01 RX ADMIN — TRAZODONE HYDROCHLORIDE 100 MG: 50 TABLET ORAL at 23:29

## 2020-01-01 RX ADMIN — PROPOFOL 10 MCG/KG/MIN: 10 INJECTION, EMULSION INTRAVENOUS at 17:04

## 2020-01-01 RX ADMIN — NADOLOL 10 MG: 20 TABLET ORAL at 09:12

## 2020-01-01 RX ADMIN — INSULIN LISPRO 1 UNITS: 100 INJECTION, SOLUTION INTRAVENOUS; SUBCUTANEOUS at 21:44

## 2020-01-01 RX ADMIN — HYDROCODONE BITARTRATE AND ACETAMINOPHEN 1 TABLET: 5; 325 TABLET ORAL at 20:46

## 2020-01-01 RX ADMIN — DOCUSATE SODIUM 100 MG: 100 CAPSULE, LIQUID FILLED ORAL at 22:26

## 2020-01-01 RX ADMIN — CHOLESTYRAMINE 4 G: 4 POWDER, FOR SUSPENSION ORAL at 09:06

## 2020-01-01 RX ADMIN — INSULIN LISPRO 1 UNITS: 100 INJECTION, SOLUTION INTRAVENOUS; SUBCUTANEOUS at 20:57

## 2020-01-01 RX ADMIN — SODIUM CHLORIDE SOLN NEBU 3% 4 ML: 3 NEBU SOLN at 05:57

## 2020-01-01 RX ADMIN — LACTULOSE 10 G: 10 SOLUTION ORAL at 21:07

## 2020-01-01 RX ADMIN — HYDROCORTISONE SODIUM SUCCINATE 100 MG: 100 INJECTION, POWDER, FOR SOLUTION INTRAMUSCULAR; INTRAVENOUS at 14:17

## 2020-01-01 RX ADMIN — WATER 1 G: 1 INJECTION INTRAMUSCULAR; INTRAVENOUS; SUBCUTANEOUS at 13:55

## 2020-01-01 RX ADMIN — SPIRONOLACTONE 100 MG: 25 TABLET ORAL at 13:55

## 2020-01-01 RX ADMIN — TRAZODONE HYDROCHLORIDE 100 MG: 50 TABLET ORAL at 21:40

## 2020-01-01 RX ADMIN — DOCUSATE SODIUM 100 MG: 100 CAPSULE, LIQUID FILLED ORAL at 09:04

## 2020-01-01 RX ADMIN — INSULIN LISPRO 1 UNITS: 100 INJECTION, SOLUTION INTRAVENOUS; SUBCUTANEOUS at 20:44

## 2020-01-01 RX ADMIN — HYDROCORTISONE SODIUM SUCCINATE 100 MG: 100 INJECTION, POWDER, FOR SOLUTION INTRAMUSCULAR; INTRAVENOUS at 20:31

## 2020-01-01 RX ADMIN — PANTOPRAZOLE SODIUM 40 MG: 40 TABLET, DELAYED RELEASE ORAL at 09:13

## 2020-01-01 RX ADMIN — SODIUM CHLORIDE: 9 INJECTION, SOLUTION INTRAVENOUS at 12:00

## 2020-01-01 RX ADMIN — PANTOPRAZOLE SODIUM 40 MG: 40 INJECTION, POWDER, FOR SOLUTION INTRAVENOUS at 08:23

## 2020-01-01 RX ADMIN — ZINC SULFATE 220 MG (50 MG) CAPSULE 50 MG: CAPSULE at 09:55

## 2020-01-01 RX ADMIN — NADOLOL 10 MG: 20 TABLET ORAL at 11:57

## 2020-01-01 RX ADMIN — LEVOFLOXACIN 750 MG: 750 TABLET, FILM COATED ORAL at 09:03

## 2020-01-01 RX ADMIN — PANTOPRAZOLE SODIUM 40 MG: 40 TABLET, DELAYED RELEASE ORAL at 20:46

## 2020-01-01 RX ADMIN — SODIUM CHLORIDE: 9 INJECTION, SOLUTION INTRAVENOUS at 12:04

## 2020-01-01 RX ADMIN — GABAPENTIN 100 MG: 100 CAPSULE ORAL at 13:32

## 2020-01-01 RX ADMIN — GUAIFENESIN 1200 MG: 600 TABLET, EXTENDED RELEASE ORAL at 08:14

## 2020-01-01 RX ADMIN — CHOLESTYRAMINE 4 G: 4 POWDER, FOR SUSPENSION ORAL at 13:29

## 2020-01-01 RX ADMIN — GABAPENTIN 100 MG: 100 CAPSULE ORAL at 09:08

## 2020-01-01 RX ADMIN — CHOLESTYRAMINE 4 G: 4 POWDER, FOR SUSPENSION ORAL at 08:52

## 2020-01-01 RX ADMIN — SERTRALINE HYDROCHLORIDE 100 MG: 100 TABLET ORAL at 08:14

## 2020-01-01 RX ADMIN — TRAZODONE HYDROCHLORIDE 100 MG: 50 TABLET ORAL at 21:20

## 2020-01-01 RX ADMIN — GABAPENTIN 100 MG: 100 CAPSULE ORAL at 09:04

## 2020-01-01 RX ADMIN — NADOLOL 10 MG: 20 TABLET ORAL at 08:14

## 2020-01-01 RX ADMIN — PANTOPRAZOLE SODIUM 40 MG: 40 TABLET, DELAYED RELEASE ORAL at 21:39

## 2020-01-01 RX ADMIN — SODIUM CHLORIDE, PRESERVATIVE FREE 10 ML: 5 INJECTION INTRAVENOUS at 20:05

## 2020-01-01 RX ADMIN — CHLORHEXIDINE GLUCONATE 0.12% ORAL RINSE 15 ML: 1.2 LIQUID ORAL at 09:34

## 2020-01-01 RX ADMIN — LACTULOSE 10 G: 10 SOLUTION ORAL at 21:48

## 2020-01-01 RX ADMIN — INSULIN LISPRO 1 UNITS: 100 INJECTION, SOLUTION INTRAVENOUS; SUBCUTANEOUS at 12:02

## 2020-01-01 RX ADMIN — HYDROXYCHLOROQUINE SULFATE 400 MG: 200 TABLET, FILM COATED ORAL at 16:48

## 2020-01-01 RX ADMIN — LEVOFLOXACIN 750 MG: 750 TABLET, FILM COATED ORAL at 08:36

## 2020-01-01 RX ADMIN — CEFAZOLIN 1 G: 1 INJECTION, POWDER, FOR SOLUTION INTRAMUSCULAR; INTRAVENOUS at 12:04

## 2020-01-01 RX ADMIN — LACTULOSE 10 G: 10 SOLUTION ORAL at 10:02

## 2020-01-01 RX ADMIN — NADOLOL 10 MG: 20 TABLET ORAL at 09:55

## 2020-01-01 RX ADMIN — CHLORHEXIDINE GLUCONATE 0.12% ORAL RINSE 15 ML: 1.2 LIQUID ORAL at 08:36

## 2020-01-01 RX ADMIN — SODIUM CHLORIDE: 9 INJECTION, SOLUTION INTRAVENOUS at 11:30

## 2020-01-01 RX ADMIN — SODIUM CHLORIDE: 9 INJECTION, SOLUTION INTRAVENOUS at 19:05

## 2020-01-01 RX ADMIN — HYDROCORTISONE SODIUM SUCCINATE 100 MG: 100 INJECTION, POWDER, FOR SOLUTION INTRAMUSCULAR; INTRAVENOUS at 20:05

## 2020-01-01 RX ADMIN — GABAPENTIN 100 MG: 100 CAPSULE ORAL at 09:26

## 2020-01-01 RX ADMIN — GABAPENTIN 100 MG: 100 CAPSULE ORAL at 21:57

## 2020-01-01 RX ADMIN — FUROSEMIDE 20 MG: 20 TABLET ORAL at 09:08

## 2020-01-01 RX ADMIN — SODIUM CHLORIDE, PRESERVATIVE FREE 10 ML: 5 INJECTION INTRAVENOUS at 08:32

## 2020-01-01 RX ADMIN — PANTOPRAZOLE SODIUM 40 MG: 40 INJECTION, POWDER, FOR SOLUTION INTRAVENOUS at 21:06

## 2020-01-01 RX ADMIN — PIPERACILLIN AND TAZOBACTAM 3.38 G: 3; .375 INJECTION, POWDER, FOR SOLUTION INTRAVENOUS at 06:43

## 2020-01-01 RX ADMIN — PIPERACILLIN AND TAZOBACTAM 3.38 G: 3; .375 INJECTION, POWDER, FOR SOLUTION INTRAVENOUS at 08:11

## 2020-01-01 RX ADMIN — PROPOFOL 25 MCG/KG/MIN: 10 INJECTION, EMULSION INTRAVENOUS at 02:17

## 2020-01-01 RX ADMIN — ZINC SULFATE 220 MG (50 MG) CAPSULE 50 MG: CAPSULE at 09:13

## 2020-01-01 RX ADMIN — GABAPENTIN 100 MG: 100 CAPSULE ORAL at 08:14

## 2020-01-01 RX ADMIN — DOCUSATE SODIUM 100 MG: 100 CAPSULE, LIQUID FILLED ORAL at 21:48

## 2020-01-01 RX ADMIN — LEVOTHYROXINE SODIUM 50 MCG: 50 TABLET ORAL at 06:59

## 2020-01-01 RX ADMIN — GABAPENTIN 100 MG: 100 CAPSULE ORAL at 16:04

## 2020-01-01 RX ADMIN — HYDROCODONE BITARTRATE AND ACETAMINOPHEN 1 TABLET: 5; 325 TABLET ORAL at 16:04

## 2020-01-01 RX ADMIN — PANTOPRAZOLE SODIUM 40 MG: 40 INJECTION, POWDER, FOR SOLUTION INTRAVENOUS at 08:34

## 2020-01-01 RX ADMIN — INSULIN LISPRO 2 UNITS: 100 INJECTION, SOLUTION INTRAVENOUS; SUBCUTANEOUS at 12:32

## 2020-01-01 RX ADMIN — PROPOFOL 15 MCG/KG/MIN: 10 INJECTION, EMULSION INTRAVENOUS at 06:30

## 2020-01-01 RX ADMIN — TRAZODONE HYDROCHLORIDE 100 MG: 50 TABLET ORAL at 22:39

## 2020-01-01 RX ADMIN — HYDROCORTISONE SODIUM SUCCINATE 100 MG: 100 INJECTION, POWDER, FOR SOLUTION INTRAMUSCULAR; INTRAVENOUS at 04:26

## 2020-01-01 RX ADMIN — INSULIN LISPRO 1 UNITS: 100 INJECTION, SOLUTION INTRAVENOUS; SUBCUTANEOUS at 05:00

## 2020-01-01 RX ADMIN — ENOXAPARIN SODIUM 40 MG: 40 INJECTION SUBCUTANEOUS at 08:58

## 2020-01-01 RX ADMIN — WATER 1 G: 1 INJECTION INTRAMUSCULAR; INTRAVENOUS; SUBCUTANEOUS at 17:05

## 2020-01-01 RX ADMIN — NADOLOL 10 MG: 20 TABLET ORAL at 13:32

## 2020-01-01 RX ADMIN — ALLOPURINOL 100 MG: 100 TABLET ORAL at 09:13

## 2020-01-01 RX ADMIN — TRAZODONE HYDROCHLORIDE 100 MG: 50 TABLET ORAL at 20:45

## 2020-01-01 RX ADMIN — SODIUM BICARBONATE: 84 INJECTION, SOLUTION INTRAVENOUS at 02:36

## 2020-01-01 RX ADMIN — ALLOPURINOL 100 MG: 100 TABLET ORAL at 09:03

## 2020-01-01 RX ADMIN — SODIUM CHLORIDE, PRESERVATIVE FREE 10 ML: 5 INJECTION INTRAVENOUS at 09:10

## 2020-01-01 RX ADMIN — PANTOPRAZOLE SODIUM 40 MG: 40 TABLET, DELAYED RELEASE ORAL at 22:40

## 2020-01-01 RX ADMIN — SODIUM CHLORIDE: 9 INJECTION, SOLUTION INTRAVENOUS at 04:33

## 2020-01-01 RX ADMIN — LACTULOSE 10 G: 10 SOLUTION ORAL at 22:25

## 2020-01-01 RX ADMIN — LEVOTHYROXINE SODIUM 50 MCG: 50 TABLET ORAL at 06:09

## 2020-01-01 RX ADMIN — INSULIN LISPRO 1 UNITS: 100 INJECTION, SOLUTION INTRAVENOUS; SUBCUTANEOUS at 04:56

## 2020-01-01 RX ADMIN — LACTULOSE 10 G: 10 SOLUTION ORAL at 09:12

## 2020-01-01 RX ADMIN — INSULIN GLARGINE 10 UNITS: 100 INJECTION, SOLUTION SUBCUTANEOUS at 21:05

## 2020-01-01 RX ADMIN — INSULIN LISPRO 1 UNITS: 100 INJECTION, SOLUTION INTRAVENOUS; SUBCUTANEOUS at 20:36

## 2020-01-01 RX ADMIN — SPIRONOLACTONE 50 MG: 25 TABLET ORAL at 08:36

## 2020-01-01 RX ADMIN — CHLORHEXIDINE GLUCONATE 0.12% ORAL RINSE 15 ML: 1.2 LIQUID ORAL at 08:32

## 2020-01-01 RX ADMIN — CHOLESTYRAMINE 4 G: 4 POWDER, FOR SUSPENSION ORAL at 09:55

## 2020-01-01 RX ADMIN — SODIUM CHLORIDE: 9 INJECTION, SOLUTION INTRAVENOUS at 22:08

## 2020-01-01 RX ADMIN — PIPERACILLIN AND TAZOBACTAM 3.38 G: 3; .375 INJECTION, POWDER, FOR SOLUTION INTRAVENOUS at 18:23

## 2020-01-01 RX ADMIN — PANTOPRAZOLE SODIUM 40 MG: 40 TABLET, DELAYED RELEASE ORAL at 09:04

## 2020-01-01 RX ADMIN — FAMOTIDINE 20 MG: 10 INJECTION INTRAVENOUS at 20:41

## 2020-01-01 RX ADMIN — INSULIN LISPRO 1 UNITS: 100 INJECTION, SOLUTION INTRAVENOUS; SUBCUTANEOUS at 11:43

## 2020-01-01 RX ADMIN — INSULIN LISPRO 1 UNITS: 100 INJECTION, SOLUTION INTRAVENOUS; SUBCUTANEOUS at 16:27

## 2020-01-01 RX ADMIN — CHLORHEXIDINE GLUCONATE 0.12% ORAL RINSE 15 ML: 1.2 LIQUID ORAL at 20:31

## 2020-01-01 RX ADMIN — SODIUM CHLORIDE: 9 INJECTION, SOLUTION INTRAVENOUS at 11:00

## 2020-01-01 RX ADMIN — INSULIN LISPRO 1 UNITS: 100 INJECTION, SOLUTION INTRAVENOUS; SUBCUTANEOUS at 00:08

## 2020-01-01 RX ADMIN — HYDROCODONE BITARTRATE AND ACETAMINOPHEN 1 TABLET: 5; 325 TABLET ORAL at 05:28

## 2020-01-01 RX ADMIN — ENOXAPARIN SODIUM 40 MG: 40 INJECTION SUBCUTANEOUS at 10:01

## 2020-01-01 RX ADMIN — PANTOPRAZOLE SODIUM 40 MG: 40 INJECTION, POWDER, FOR SOLUTION INTRAVENOUS at 09:00

## 2020-01-01 RX ADMIN — AZITHROMYCIN 500 MG: 500 INJECTION, POWDER, LYOPHILIZED, FOR SOLUTION INTRAVENOUS at 12:35

## 2020-01-01 RX ADMIN — Medication 22 MCG/MIN: at 08:43

## 2020-01-01 RX ADMIN — MIDAZOLAM 1 MG: 1 INJECTION INTRAMUSCULAR; INTRAVENOUS at 11:48

## 2020-01-01 RX ADMIN — PROPOFOL 150 MG: 10 INJECTION, EMULSION INTRAVENOUS at 12:11

## 2020-01-01 RX ADMIN — GABAPENTIN 100 MG: 100 CAPSULE ORAL at 14:54

## 2020-01-01 RX ADMIN — PROPOFOL 20 MCG/KG/MIN: 10 INJECTION, EMULSION INTRAVENOUS at 18:12

## 2020-01-01 RX ADMIN — PANTOPRAZOLE SODIUM 40 MG: 40 INJECTION, POWDER, FOR SOLUTION INTRAVENOUS at 20:00

## 2020-01-01 RX ADMIN — GABAPENTIN 100 MG: 100 CAPSULE ORAL at 08:36

## 2020-01-01 RX ADMIN — Medication 8 MCG/MIN: at 02:36

## 2020-01-01 RX ADMIN — SPIRONOLACTONE 50 MG: 25 TABLET ORAL at 10:03

## 2020-01-01 RX ADMIN — INSULIN LISPRO 1 UNITS: 100 INJECTION, SOLUTION INTRAVENOUS; SUBCUTANEOUS at 21:45

## 2020-01-01 RX ADMIN — INSULIN LISPRO 3 UNITS: 100 INJECTION, SOLUTION INTRAVENOUS; SUBCUTANEOUS at 12:48

## 2020-01-01 RX ADMIN — GABAPENTIN 100 MG: 100 CAPSULE ORAL at 13:16

## 2020-01-01 RX ADMIN — PANTOPRAZOLE SODIUM 40 MG: 40 TABLET, DELAYED RELEASE ORAL at 21:07

## 2020-01-01 RX ADMIN — ALLOPURINOL 100 MG: 100 TABLET ORAL at 08:59

## 2020-01-01 RX ADMIN — ACETAMINOPHEN 650 MG: 325 TABLET, FILM COATED ORAL at 17:39

## 2020-01-01 RX ADMIN — DOCUSATE SODIUM 100 MG: 100 CAPSULE, LIQUID FILLED ORAL at 21:40

## 2020-01-01 RX ADMIN — SPIRONOLACTONE 50 MG: 25 TABLET ORAL at 09:03

## 2020-01-01 RX ADMIN — FENTANYL CITRATE 50 MCG: 50 INJECTION, SOLUTION INTRAMUSCULAR; INTRAVENOUS at 13:24

## 2020-01-01 RX ADMIN — METHYLPREDNISOLONE SODIUM SUCCINATE 60 MG: 125 INJECTION, POWDER, FOR SOLUTION INTRAMUSCULAR; INTRAVENOUS at 20:30

## 2020-01-01 RX ADMIN — ENOXAPARIN SODIUM 40 MG: 40 INJECTION SUBCUTANEOUS at 08:13

## 2020-01-01 RX ADMIN — INSULIN LISPRO 1 UNITS: 100 INJECTION, SOLUTION INTRAVENOUS; SUBCUTANEOUS at 05:09

## 2020-01-01 RX ADMIN — SODIUM CHLORIDE, PRESERVATIVE FREE 10 ML: 5 INJECTION INTRAVENOUS at 09:58

## 2020-01-01 RX ADMIN — Medication 10 MCG/MIN: at 09:30

## 2020-01-01 RX ADMIN — DOCUSATE SODIUM 100 MG: 100 CAPSULE, LIQUID FILLED ORAL at 09:18

## 2020-01-01 RX ADMIN — LACTULOSE 10 G: 10 SOLUTION ORAL at 09:03

## 2020-01-01 RX ADMIN — SERTRALINE HYDROCHLORIDE 100 MG: 100 TABLET ORAL at 09:25

## 2020-01-01 RX ADMIN — POTASSIUM CHLORIDE 20 MEQ: 400 INJECTION, SOLUTION INTRAVENOUS at 16:09

## 2020-01-01 RX ADMIN — MORPHINE SULFATE 2 MG: 4 INJECTION, SOLUTION INTRAMUSCULAR; INTRAVENOUS at 11:20

## 2020-01-01 RX ADMIN — AZITHROMYCIN 250 MG: 250 TABLET, FILM COATED ORAL at 09:07

## 2020-01-01 RX ADMIN — INSULIN LISPRO 1 UNITS: 100 INJECTION, SOLUTION INTRAVENOUS; SUBCUTANEOUS at 13:30

## 2020-01-01 RX ADMIN — SODIUM CHLORIDE SOLN NEBU 3% 4 ML: 3 NEBU SOLN at 05:10

## 2020-01-01 RX ADMIN — HYDROCODONE BITARTRATE AND ACETAMINOPHEN 1 TABLET: 5; 325 TABLET ORAL at 21:13

## 2020-01-01 RX ADMIN — SODIUM CHLORIDE, PRESERVATIVE FREE 10 ML: 5 INJECTION INTRAVENOUS at 21:25

## 2020-01-01 RX ADMIN — DOCUSATE SODIUM 100 MG: 100 CAPSULE, LIQUID FILLED ORAL at 08:59

## 2020-01-01 RX ADMIN — Medication 24 MCG/MIN: at 22:18

## 2020-01-01 RX ADMIN — PANTOPRAZOLE SODIUM 40 MG: 40 TABLET, DELAYED RELEASE ORAL at 08:37

## 2020-01-01 RX ADMIN — PANTOPRAZOLE SODIUM 40 MG: 40 INJECTION, POWDER, FOR SOLUTION INTRAVENOUS at 20:05

## 2020-01-01 RX ADMIN — SODIUM CHLORIDE: 9 INJECTION, SOLUTION INTRAVENOUS at 13:17

## 2020-01-01 RX ADMIN — ANTICOAGULANT SODIUM CITRATE SOLUTION 0.16 G: 4 SOLUTION INTRAVENOUS at 15:25

## 2020-01-01 RX ADMIN — WATER 1 G: 1 INJECTION INTRAMUSCULAR; INTRAVENOUS; SUBCUTANEOUS at 14:53

## 2020-01-01 RX ADMIN — ALLOPURINOL 100 MG: 100 TABLET ORAL at 08:37

## 2020-01-01 RX ADMIN — INSULIN GLARGINE 10 UNITS: 100 INJECTION, SOLUTION SUBCUTANEOUS at 12:47

## 2020-01-01 RX ADMIN — INSULIN LISPRO 3 UNITS: 100 INJECTION, SOLUTION INTRAVENOUS; SUBCUTANEOUS at 01:04

## 2020-01-01 RX ADMIN — MIDAZOLAM 1 MG: 1 INJECTION INTRAMUSCULAR; INTRAVENOUS at 12:04

## 2020-01-01 RX ADMIN — LEVOTHYROXINE SODIUM 50 MCG: 50 TABLET ORAL at 05:54

## 2020-01-01 RX ADMIN — ZINC SULFATE 220 MG (50 MG) CAPSULE 50 MG: CAPSULE at 08:14

## 2020-01-01 RX ADMIN — PANTOPRAZOLE SODIUM 40 MG: 40 TABLET, DELAYED RELEASE ORAL at 08:59

## 2020-01-01 RX ADMIN — GABAPENTIN 100 MG: 100 CAPSULE ORAL at 00:23

## 2020-01-01 RX ADMIN — SODIUM CHLORIDE, PRESERVATIVE FREE 10 ML: 5 INJECTION INTRAVENOUS at 21:06

## 2020-01-01 RX ADMIN — SERTRALINE HYDROCHLORIDE 100 MG: 100 TABLET ORAL at 13:29

## 2020-01-01 RX ADMIN — HYDROXYCHLOROQUINE SULFATE 200 MG: 200 TABLET, FILM COATED ORAL at 09:07

## 2020-01-01 RX ADMIN — SODIUM CHLORIDE 1000 ML: 9 INJECTION, SOLUTION INTRAVENOUS at 11:00

## 2020-01-01 RX ADMIN — INSULIN LISPRO 3 UNITS: 100 INJECTION, SOLUTION INTRAVENOUS; SUBCUTANEOUS at 00:28

## 2020-01-01 RX ADMIN — GABAPENTIN 100 MG: 100 CAPSULE ORAL at 13:24

## 2020-01-01 RX ADMIN — ENOXAPARIN SODIUM 40 MG: 40 INJECTION SUBCUTANEOUS at 08:51

## 2020-01-01 RX ADMIN — INSULIN GLARGINE 10 UNITS: 100 INJECTION, SOLUTION SUBCUTANEOUS at 09:17

## 2020-01-01 RX ADMIN — WATER 10 ML: 1 INJECTION INTRAMUSCULAR; INTRAVENOUS; SUBCUTANEOUS at 05:05

## 2020-01-01 RX ADMIN — ALBUMIN (HUMAN) 25 G: 0.25 INJECTION, SOLUTION INTRAVENOUS at 05:53

## 2020-01-01 RX ADMIN — PROPOFOL 15 MCG/KG/MIN: 10 INJECTION, EMULSION INTRAVENOUS at 11:27

## 2020-01-01 RX ADMIN — NADOLOL 10 MG: 20 TABLET ORAL at 08:35

## 2020-01-01 RX ADMIN — ALLOPURINOL 100 MG: 100 TABLET ORAL at 09:20

## 2020-01-01 RX ADMIN — NADOLOL 10 MG: 20 TABLET ORAL at 09:18

## 2020-01-01 RX ADMIN — PANTOPRAZOLE SODIUM 40 MG: 40 INJECTION, POWDER, FOR SOLUTION INTRAVENOUS at 13:57

## 2020-01-01 RX ADMIN — SODIUM BICARBONATE: 84 INJECTION, SOLUTION INTRAVENOUS at 05:55

## 2020-01-01 RX ADMIN — DOCUSATE SODIUM 100 MG: 100 CAPSULE, LIQUID FILLED ORAL at 20:39

## 2020-01-01 RX ADMIN — HYDROCORTISONE SODIUM SUCCINATE 100 MG: 100 INJECTION, POWDER, FOR SOLUTION INTRAMUSCULAR; INTRAVENOUS at 13:27

## 2020-01-01 RX ADMIN — GABAPENTIN 100 MG: 100 CAPSULE ORAL at 14:14

## 2020-01-01 RX ADMIN — SODIUM CHLORIDE, PRESERVATIVE FREE 10 ML: 5 INJECTION INTRAVENOUS at 08:34

## 2020-01-01 RX ADMIN — GABAPENTIN 100 MG: 100 CAPSULE ORAL at 09:09

## 2020-01-01 RX ADMIN — SODIUM CHLORIDE: 9 INJECTION, SOLUTION INTRAVENOUS at 04:15

## 2020-01-01 RX ADMIN — GUAIFENESIN 1200 MG: 600 TABLET, EXTENDED RELEASE ORAL at 23:29

## 2020-01-01 RX ADMIN — HYDROCORTISONE SODIUM SUCCINATE 100 MG: 100 INJECTION, POWDER, FOR SOLUTION INTRAMUSCULAR; INTRAVENOUS at 05:01

## 2020-01-01 RX ADMIN — INSULIN LISPRO 2 UNITS: 100 INJECTION, SOLUTION INTRAVENOUS; SUBCUTANEOUS at 17:22

## 2020-01-01 RX ADMIN — VANCOMYCIN HYDROCHLORIDE 1000 MG: 1 INJECTION, POWDER, LYOPHILIZED, FOR SOLUTION INTRAVENOUS at 18:20

## 2020-01-01 RX ADMIN — HYDROCODONE BITARTRATE AND ACETAMINOPHEN 1 TABLET: 5; 325 TABLET ORAL at 22:13

## 2020-01-01 RX ADMIN — FENTANYL CITRATE 50 MCG: 50 INJECTION, SOLUTION INTRAMUSCULAR; INTRAVENOUS at 13:39

## 2020-01-01 RX ADMIN — SPIRONOLACTONE 50 MG: 25 TABLET ORAL at 09:08

## 2020-01-01 RX ADMIN — ENOXAPARIN SODIUM 40 MG: 40 INJECTION SUBCUTANEOUS at 09:03

## 2020-01-01 RX ADMIN — PANTOPRAZOLE SODIUM 40 MG: 40 TABLET, DELAYED RELEASE ORAL at 09:25

## 2020-01-01 RX ADMIN — INSULIN LISPRO 1 UNITS: 100 INJECTION, SOLUTION INTRAVENOUS; SUBCUTANEOUS at 21:27

## 2020-01-01 RX ADMIN — ENOXAPARIN SODIUM 40 MG: 40 INJECTION SUBCUTANEOUS at 09:13

## 2020-01-01 RX ADMIN — FUROSEMIDE 20 MG: 10 INJECTION, SOLUTION INTRAMUSCULAR; INTRAVENOUS at 14:18

## 2020-01-01 RX ADMIN — DOCUSATE SODIUM 100 MG: 100 CAPSULE, LIQUID FILLED ORAL at 23:29

## 2020-01-01 RX ADMIN — SERTRALINE HYDROCHLORIDE 100 MG: 100 TABLET ORAL at 09:08

## 2020-01-01 RX ADMIN — CHOLESTYRAMINE 4 G: 4 POWDER, FOR SUSPENSION ORAL at 09:18

## 2020-01-01 RX ADMIN — INSULIN LISPRO 1 UNITS: 100 INJECTION, SOLUTION INTRAVENOUS; SUBCUTANEOUS at 07:33

## 2020-01-01 RX ADMIN — LACTULOSE 10 G: 10 SOLUTION ORAL at 09:26

## 2020-01-01 RX ADMIN — INSULIN LISPRO 2 UNITS: 100 INJECTION, SOLUTION INTRAVENOUS; SUBCUTANEOUS at 12:28

## 2020-01-01 RX ADMIN — LEVOTHYROXINE SODIUM 50 MCG: 50 TABLET ORAL at 05:33

## 2020-01-01 RX ADMIN — TRAZODONE HYDROCHLORIDE 100 MG: 50 TABLET ORAL at 00:22

## 2020-01-01 RX ADMIN — PROPOFOL 15 MCG/KG/MIN: 10 INJECTION, EMULSION INTRAVENOUS at 03:16

## 2020-01-01 RX ADMIN — LACTULOSE 10 G: 10 SOLUTION ORAL at 22:40

## 2020-01-01 RX ADMIN — INSULIN LISPRO 1 UNITS: 100 INJECTION, SOLUTION INTRAVENOUS; SUBCUTANEOUS at 07:54

## 2020-01-01 RX ADMIN — FUROSEMIDE 40 MG: 40 TABLET ORAL at 09:18

## 2020-01-01 RX ADMIN — HYDROCORTISONE SODIUM SUCCINATE 100 MG: 100 INJECTION, POWDER, FOR SOLUTION INTRAMUSCULAR; INTRAVENOUS at 20:00

## 2020-01-01 RX ADMIN — SODIUM CHLORIDE: 9 INJECTION, SOLUTION INTRAVENOUS at 22:26

## 2020-01-01 RX ADMIN — LEVOTHYROXINE SODIUM 50 MCG: 50 TABLET ORAL at 06:39

## 2020-01-01 RX ADMIN — INSULIN LISPRO 1 UNITS: 100 INJECTION, SOLUTION INTRAVENOUS; SUBCUTANEOUS at 11:39

## 2020-01-01 RX ADMIN — GABAPENTIN 100 MG: 100 CAPSULE ORAL at 14:50

## 2020-01-01 RX ADMIN — GABAPENTIN 100 MG: 100 CAPSULE ORAL at 09:12

## 2020-01-01 RX ADMIN — GABAPENTIN 100 MG: 100 CAPSULE ORAL at 21:40

## 2020-01-01 RX ADMIN — ROSUVASTATIN CALCIUM 10 MG: 10 TABLET, COATED ORAL at 09:07

## 2020-01-01 RX ADMIN — ZINC SULFATE 220 MG (50 MG) CAPSULE 50 MG: CAPSULE at 13:30

## 2020-01-01 RX ADMIN — LACTULOSE 10 G: 10 SOLUTION ORAL at 11:25

## 2020-01-01 RX ADMIN — PANTOPRAZOLE SODIUM 40 MG: 40 TABLET, DELAYED RELEASE ORAL at 21:58

## 2020-01-01 RX ADMIN — NADOLOL 10 MG: 20 TABLET ORAL at 08:59

## 2020-01-01 RX ADMIN — SODIUM CHLORIDE: 9 INJECTION, SOLUTION INTRAVENOUS at 08:34

## 2020-01-01 RX ADMIN — CHOLESTYRAMINE 4 G: 4 POWDER, FOR SUSPENSION ORAL at 09:13

## 2020-01-01 RX ADMIN — LEVOFLOXACIN 750 MG: 750 TABLET, FILM COATED ORAL at 09:13

## 2020-01-01 RX ADMIN — GUAIFENESIN 1200 MG: 600 TABLET, EXTENDED RELEASE ORAL at 21:15

## 2020-01-01 RX ADMIN — PIPERACILLIN AND TAZOBACTAM 3.38 G: 3; .375 INJECTION, POWDER, FOR SOLUTION INTRAVENOUS at 06:24

## 2020-01-01 RX ADMIN — PANTOPRAZOLE SODIUM 40 MG: 40 TABLET, DELAYED RELEASE ORAL at 08:53

## 2020-01-01 RX ADMIN — SODIUM CHLORIDE: 9 INJECTION, SOLUTION INTRAVENOUS at 01:51

## 2020-01-01 RX ADMIN — SPIRONOLACTONE 100 MG: 25 TABLET ORAL at 09:04

## 2020-01-01 RX ADMIN — GABAPENTIN 100 MG: 100 CAPSULE ORAL at 14:09

## 2020-01-01 RX ADMIN — DOCUSATE SODIUM 100 MG: 100 CAPSULE, LIQUID FILLED ORAL at 22:40

## 2020-01-01 RX ADMIN — SODIUM CHLORIDE SOLN NEBU 3% 4 ML: 3 NEBU SOLN at 05:29

## 2020-01-01 RX ADMIN — ZINC SULFATE 220 MG (50 MG) CAPSULE 50 MG: CAPSULE at 11:55

## 2020-01-01 RX ADMIN — WATER 1 G: 1 INJECTION INTRAMUSCULAR; INTRAVENOUS; SUBCUTANEOUS at 17:22

## 2020-01-01 RX ADMIN — CHLORHEXIDINE GLUCONATE 0.12% ORAL RINSE 15 ML: 1.2 LIQUID ORAL at 08:43

## 2020-01-01 RX ADMIN — PROPOFOL 10 MCG/KG/MIN: 10 INJECTION, EMULSION INTRAVENOUS at 01:09

## 2020-01-01 RX ADMIN — LEVOTHYROXINE SODIUM 50 MCG: 50 TABLET ORAL at 06:40

## 2020-01-01 RX ADMIN — CHLORHEXIDINE GLUCONATE 0.12% ORAL RINSE 15 ML: 1.2 LIQUID ORAL at 09:00

## 2020-01-01 RX ADMIN — SPIRONOLACTONE 100 MG: 25 TABLET ORAL at 09:13

## 2020-01-01 RX ADMIN — INSULIN LISPRO 2 UNITS: 100 INJECTION, SOLUTION INTRAVENOUS; SUBCUTANEOUS at 21:07

## 2020-01-01 RX ADMIN — HYDROCORTISONE SODIUM SUCCINATE 100 MG: 100 INJECTION, POWDER, FOR SOLUTION INTRAMUSCULAR; INTRAVENOUS at 05:08

## 2020-01-01 RX ADMIN — METHYLPREDNISOLONE SODIUM SUCCINATE 60 MG: 125 INJECTION, POWDER, FOR SOLUTION INTRAMUSCULAR; INTRAVENOUS at 13:41

## 2020-01-01 RX ADMIN — PANTOPRAZOLE SODIUM 40 MG: 40 TABLET, DELAYED RELEASE ORAL at 09:08

## 2020-01-01 RX ADMIN — Medication 16 MCG/MIN: at 15:27

## 2020-01-01 RX ADMIN — ZINC SULFATE 220 MG (50 MG) CAPSULE 50 MG: CAPSULE at 08:35

## 2020-01-01 RX ADMIN — SODIUM CHLORIDE 20 ML: 9 INJECTION, SOLUTION INTRAVENOUS at 21:54

## 2020-01-01 RX ADMIN — NADOLOL 10 MG: 20 TABLET ORAL at 08:37

## 2020-01-01 RX ADMIN — GABAPENTIN 100 MG: 100 CAPSULE ORAL at 22:40

## 2020-01-01 RX ADMIN — INSULIN LISPRO 1 UNITS: 100 INJECTION, SOLUTION INTRAVENOUS; SUBCUTANEOUS at 09:04

## 2020-01-01 RX ADMIN — GABAPENTIN 100 MG: 100 CAPSULE ORAL at 10:03

## 2020-01-01 RX ADMIN — GABAPENTIN 100 MG: 100 CAPSULE ORAL at 08:53

## 2020-01-01 RX ADMIN — WATER 1 G: 1 INJECTION INTRAMUSCULAR; INTRAVENOUS; SUBCUTANEOUS at 17:29

## 2020-01-01 RX ADMIN — GUAIFENESIN 1200 MG: 600 TABLET, EXTENDED RELEASE ORAL at 21:58

## 2020-01-01 RX ADMIN — SPIRONOLACTONE 50 MG: 25 TABLET ORAL at 08:37

## 2020-01-01 RX ADMIN — ENOXAPARIN SODIUM 40 MG: 40 INJECTION SUBCUTANEOUS at 08:38

## 2020-01-01 RX ADMIN — POTASSIUM CHLORIDE 20 MEQ: 400 INJECTION, SOLUTION INTRAVENOUS at 13:53

## 2020-01-01 RX ADMIN — SERTRALINE HYDROCHLORIDE 100 MG: 100 TABLET ORAL at 08:37

## 2020-01-01 RX ADMIN — FUROSEMIDE 20 MG: 20 TABLET ORAL at 10:04

## 2020-01-01 RX ADMIN — PROPOFOL 15 MCG/KG/MIN: 10 INJECTION, EMULSION INTRAVENOUS at 00:21

## 2020-01-01 RX ADMIN — GUAIFENESIN 1200 MG: 600 TABLET, EXTENDED RELEASE ORAL at 00:23

## 2020-01-01 RX ADMIN — INSULIN LISPRO 1 UNITS: 100 INJECTION, SOLUTION INTRAVENOUS; SUBCUTANEOUS at 11:57

## 2020-01-01 RX ADMIN — GABAPENTIN 100 MG: 100 CAPSULE ORAL at 13:28

## 2020-01-01 RX ADMIN — PIPERACILLIN AND TAZOBACTAM 3.38 G: 3; .375 INJECTION, POWDER, FOR SOLUTION INTRAVENOUS at 07:02

## 2020-01-01 RX ADMIN — SODIUM CHLORIDE, PRESERVATIVE FREE 10 ML: 5 INJECTION INTRAVENOUS at 08:44

## 2020-01-01 RX ADMIN — PANTOPRAZOLE SODIUM 40 MG: 40 INJECTION, POWDER, FOR SOLUTION INTRAVENOUS at 09:01

## 2020-01-01 RX ADMIN — SODIUM CHLORIDE: 9 INJECTION, SOLUTION INTRAVENOUS at 00:19

## 2020-01-01 RX ADMIN — GABAPENTIN 100 MG: 100 CAPSULE ORAL at 08:37

## 2020-01-01 RX ADMIN — GABAPENTIN 100 MG: 100 CAPSULE ORAL at 21:19

## 2020-01-01 RX ADMIN — INSULIN LISPRO 2 UNITS: 100 INJECTION, SOLUTION INTRAVENOUS; SUBCUTANEOUS at 16:53

## 2020-01-01 RX ADMIN — SODIUM CHLORIDE, PRESERVATIVE FREE 10 ML: 5 INJECTION INTRAVENOUS at 09:01

## 2020-01-01 RX ADMIN — DOCUSATE SODIUM 100 MG: 100 CAPSULE, LIQUID FILLED ORAL at 08:51

## 2020-01-01 RX ADMIN — MIDAZOLAM 2 MG: 1 INJECTION INTRAMUSCULAR; INTRAVENOUS at 15:13

## 2020-01-01 RX ADMIN — CHLORHEXIDINE GLUCONATE 0.12% ORAL RINSE 15 ML: 1.2 LIQUID ORAL at 09:58

## 2020-01-01 RX ADMIN — PIPERACILLIN AND TAZOBACTAM 3.38 G: 3; .375 INJECTION, POWDER, FOR SOLUTION INTRAVENOUS at 05:49

## 2020-01-01 RX ADMIN — ENOXAPARIN SODIUM 40 MG: 40 INJECTION SUBCUTANEOUS at 09:18

## 2020-01-01 RX ADMIN — INSULIN LISPRO 1 UNITS: 100 INJECTION, SOLUTION INTRAVENOUS; SUBCUTANEOUS at 10:07

## 2020-01-01 RX ADMIN — NADOLOL 10 MG: 20 TABLET ORAL at 08:52

## 2020-01-01 RX ADMIN — GABAPENTIN 100 MG: 100 CAPSULE ORAL at 13:55

## 2020-01-01 RX ADMIN — DOCUSATE SODIUM 100 MG: 100 CAPSULE, LIQUID FILLED ORAL at 09:25

## 2020-01-01 RX ADMIN — SODIUM BICARBONATE: 84 INJECTION, SOLUTION INTRAVENOUS at 17:30

## 2020-01-01 RX ADMIN — WATER 1 G: 1 INJECTION INTRAMUSCULAR; INTRAVENOUS; SUBCUTANEOUS at 14:09

## 2020-01-01 RX ADMIN — TRAZODONE HYDROCHLORIDE 100 MG: 50 TABLET ORAL at 22:26

## 2020-01-01 RX ADMIN — AZITHROMYCIN 250 MG: 250 TABLET, FILM COATED ORAL at 08:53

## 2020-01-01 RX ADMIN — LEVOTHYROXINE SODIUM 50 MCG: 50 TABLET ORAL at 05:24

## 2020-01-01 RX ADMIN — SODIUM CHLORIDE 20 ML: 9 INJECTION, SOLUTION INTRAVENOUS at 10:00

## 2020-01-01 RX ADMIN — INSULIN LISPRO 3 UNITS: 100 INJECTION, SOLUTION INTRAVENOUS; SUBCUTANEOUS at 05:41

## 2020-01-01 RX ADMIN — LEVOTHYROXINE SODIUM 50 MCG: 50 TABLET ORAL at 06:25

## 2020-01-01 RX ADMIN — HYDROCORTISONE SODIUM SUCCINATE 100 MG: 100 INJECTION, POWDER, FOR SOLUTION INTRAMUSCULAR; INTRAVENOUS at 17:02

## 2020-01-01 RX ADMIN — SODIUM CHLORIDE: 9 INJECTION, SOLUTION INTRAVENOUS at 08:37

## 2020-01-01 RX ADMIN — FUROSEMIDE 20 MG: 20 TABLET ORAL at 09:04

## 2020-01-01 RX ADMIN — CHOLESTYRAMINE 4 G: 4 POWDER, FOR SUSPENSION ORAL at 09:09

## 2020-01-01 RX ADMIN — LEVOTHYROXINE SODIUM 50 MCG: 50 TABLET ORAL at 06:43

## 2020-01-01 RX ADMIN — SODIUM CHLORIDE: 9 INJECTION, SOLUTION INTRAVENOUS at 13:10

## 2020-01-01 RX ADMIN — HYDROCORTISONE SODIUM SUCCINATE 50 MG: 100 INJECTION, POWDER, FOR SOLUTION INTRAMUSCULAR; INTRAVENOUS at 04:59

## 2020-01-01 RX ADMIN — PROPOFOL 20 MCG/KG/MIN: 10 INJECTION, EMULSION INTRAVENOUS at 13:29

## 2020-01-01 RX ADMIN — LEVOTHYROXINE SODIUM 50 MCG: 50 TABLET ORAL at 06:47

## 2020-01-01 RX ADMIN — INSULIN LISPRO 2 UNITS: 100 INJECTION, SOLUTION INTRAVENOUS; SUBCUTANEOUS at 00:31

## 2020-01-01 RX ADMIN — INSULIN LISPRO 1 UNITS: 100 INJECTION, SOLUTION INTRAVENOUS; SUBCUTANEOUS at 00:27

## 2020-01-01 RX ADMIN — GUAIFENESIN 1200 MG: 600 TABLET, EXTENDED RELEASE ORAL at 09:04

## 2020-01-01 RX ADMIN — SERTRALINE HYDROCHLORIDE 100 MG: 100 TABLET ORAL at 08:51

## 2020-01-01 RX ADMIN — LEVOFLOXACIN 750 MG: 750 TABLET, FILM COATED ORAL at 13:42

## 2020-01-01 RX ADMIN — ALLOPURINOL 100 MG: 100 TABLET ORAL at 09:07

## 2020-01-01 RX ADMIN — ZINC SULFATE 220 MG (50 MG) CAPSULE 50 MG: CAPSULE at 08:37

## 2020-01-01 RX ADMIN — ZINC SULFATE 220 MG (50 MG) CAPSULE 50 MG: CAPSULE at 09:04

## 2020-01-01 RX ADMIN — DOCUSATE SODIUM 100 MG: 100 CAPSULE, LIQUID FILLED ORAL at 09:12

## 2020-01-01 RX ADMIN — ALLOPURINOL 100 MG: 100 TABLET ORAL at 09:04

## 2020-01-01 RX ADMIN — INSULIN LISPRO 2 UNITS: 100 INJECTION, SOLUTION INTRAVENOUS; SUBCUTANEOUS at 05:02

## 2020-01-01 RX ADMIN — HYDROCORTISONE SODIUM SUCCINATE 100 MG: 100 INJECTION, POWDER, FOR SOLUTION INTRAMUSCULAR; INTRAVENOUS at 19:51

## 2020-01-01 RX ADMIN — LEVOTHYROXINE SODIUM 50 MCG: 50 TABLET ORAL at 05:37

## 2020-01-01 RX ADMIN — INSULIN LISPRO 3 UNITS: 100 INJECTION, SOLUTION INTRAVENOUS; SUBCUTANEOUS at 08:45

## 2020-01-01 RX ADMIN — Medication: at 14:26

## 2020-01-01 RX ADMIN — GABAPENTIN 100 MG: 100 CAPSULE ORAL at 15:51

## 2020-01-01 RX ADMIN — PANTOPRAZOLE SODIUM 40 MG: 40 TABLET, DELAYED RELEASE ORAL at 09:19

## 2020-01-01 RX ADMIN — INSULIN LISPRO 2 UNITS: 100 INJECTION, SOLUTION INTRAVENOUS; SUBCUTANEOUS at 13:31

## 2020-01-01 RX ADMIN — HYDROCORTISONE SODIUM SUCCINATE 100 MG: 100 INJECTION, POWDER, FOR SOLUTION INTRAMUSCULAR; INTRAVENOUS at 13:18

## 2020-01-01 RX ADMIN — CHLORHEXIDINE GLUCONATE 0.12% ORAL RINSE 15 ML: 1.2 LIQUID ORAL at 08:23

## 2020-01-01 RX ADMIN — HYDROCORTISONE SODIUM SUCCINATE 100 MG: 100 INJECTION, POWDER, FOR SOLUTION INTRAMUSCULAR; INTRAVENOUS at 04:47

## 2020-01-01 RX ADMIN — INSULIN LISPRO 1 UNITS: 100 INJECTION, SOLUTION INTRAVENOUS; SUBCUTANEOUS at 13:00

## 2020-01-01 RX ADMIN — CHOLESTYRAMINE 4 G: 4 POWDER, FOR SUSPENSION ORAL at 09:03

## 2020-01-01 RX ADMIN — SODIUM CHLORIDE: 9 INJECTION, SOLUTION INTRAVENOUS at 17:05

## 2020-01-01 RX ADMIN — ENOXAPARIN SODIUM 40 MG: 40 INJECTION SUBCUTANEOUS at 08:35

## 2020-01-01 RX ADMIN — FUROSEMIDE 20 MG: 20 TABLET ORAL at 09:25

## 2020-01-01 RX ADMIN — DOCUSATE SODIUM 100 MG: 100 CAPSULE, LIQUID FILLED ORAL at 21:07

## 2020-01-01 RX ADMIN — PANTOPRAZOLE SODIUM 40 MG: 40 TABLET, DELAYED RELEASE ORAL at 21:40

## 2020-01-01 RX ADMIN — CHOLESTYRAMINE 4 G: 4 POWDER, FOR SUSPENSION ORAL at 08:37

## 2020-01-01 RX ADMIN — GABAPENTIN 100 MG: 100 CAPSULE ORAL at 09:03

## 2020-01-01 RX ADMIN — SODIUM BICARBONATE: 84 INJECTION, SOLUTION INTRAVENOUS at 13:06

## 2020-01-01 RX ADMIN — SERTRALINE HYDROCHLORIDE 100 MG: 100 TABLET ORAL at 09:55

## 2020-01-01 RX ADMIN — HYDROCORTISONE SODIUM SUCCINATE 50 MG: 100 INJECTION, POWDER, FOR SOLUTION INTRAMUSCULAR; INTRAVENOUS at 12:45

## 2020-01-01 RX ADMIN — PANTOPRAZOLE SODIUM 40 MG: 40 TABLET, DELAYED RELEASE ORAL at 20:39

## 2020-01-01 RX ADMIN — CHLORHEXIDINE GLUCONATE 0.12% ORAL RINSE 15 ML: 1.2 LIQUID ORAL at 20:56

## 2020-01-01 RX ADMIN — PIPERACILLIN AND TAZOBACTAM 3.38 G: 3; .375 INJECTION, POWDER, FOR SOLUTION INTRAVENOUS at 18:11

## 2020-01-01 RX ADMIN — PROPOFOL 15 MCG/KG/MIN: 10 INJECTION, EMULSION INTRAVENOUS at 19:51

## 2020-01-01 RX ADMIN — CHOLESTYRAMINE 4 G: 4 POWDER, FOR SUSPENSION ORAL at 08:58

## 2020-01-01 RX ADMIN — SERTRALINE HYDROCHLORIDE 100 MG: 100 TABLET ORAL at 09:03

## 2020-01-01 RX ADMIN — PANTOPRAZOLE SODIUM 40 MG: 40 TABLET, DELAYED RELEASE ORAL at 09:03

## 2020-01-01 RX ADMIN — LEVOFLOXACIN 750 MG: 750 TABLET, FILM COATED ORAL at 08:59

## 2020-01-01 RX ADMIN — HYDROCORTISONE SODIUM SUCCINATE 100 MG: 100 INJECTION, POWDER, FOR SOLUTION INTRAMUSCULAR; INTRAVENOUS at 13:23

## 2020-01-01 RX ADMIN — ZINC SULFATE 220 MG (50 MG) CAPSULE 50 MG: CAPSULE at 08:54

## 2020-01-01 RX ADMIN — ALLOPURINOL 100 MG: 100 TABLET ORAL at 10:02

## 2020-01-01 RX ADMIN — HYDROCORTISONE SODIUM SUCCINATE 100 MG: 100 INJECTION, POWDER, FOR SOLUTION INTRAMUSCULAR; INTRAVENOUS at 13:58

## 2020-01-01 RX ADMIN — PROPOFOL 20 MCG/KG/MIN: 10 INJECTION, EMULSION INTRAVENOUS at 20:30

## 2020-01-01 RX ADMIN — PANTOPRAZOLE SODIUM 40 MG: 40 TABLET, DELAYED RELEASE ORAL at 20:18

## 2020-01-01 RX ADMIN — ZINC SULFATE 220 MG (50 MG) CAPSULE 50 MG: CAPSULE at 08:52

## 2020-01-01 RX ADMIN — SERTRALINE HYDROCHLORIDE 100 MG: 100 TABLET ORAL at 09:13

## 2020-01-01 RX ADMIN — LACTULOSE 10 G: 10 SOLUTION ORAL at 09:04

## 2020-01-01 RX ADMIN — FUROSEMIDE 20 MG: 20 TABLET ORAL at 08:35

## 2020-01-01 RX ADMIN — LACTULOSE 10 G: 10 SOLUTION ORAL at 21:14

## 2020-01-01 RX ADMIN — LACTULOSE 10 G: 10 SOLUTION ORAL at 08:13

## 2020-01-01 RX ADMIN — LACTULOSE 10 G: 10 SOLUTION ORAL at 08:35

## 2020-01-01 RX ADMIN — NADOLOL 10 MG: 20 TABLET ORAL at 09:08

## 2020-01-01 RX ADMIN — INSULIN LISPRO 3 UNITS: 100 INJECTION, SOLUTION INTRAVENOUS; SUBCUTANEOUS at 00:55

## 2020-01-01 RX ADMIN — INSULIN LISPRO 1 UNITS: 100 INJECTION, SOLUTION INTRAVENOUS; SUBCUTANEOUS at 13:13

## 2020-01-01 RX ADMIN — INSULIN LISPRO 2 UNITS: 100 INJECTION, SOLUTION INTRAVENOUS; SUBCUTANEOUS at 08:24

## 2020-01-01 RX ADMIN — ONDANSETRON 4 MG: 2 INJECTION INTRAMUSCULAR; INTRAVENOUS at 12:11

## 2020-01-01 RX ADMIN — GABAPENTIN 100 MG: 100 CAPSULE ORAL at 14:52

## 2020-01-01 RX ADMIN — LACTULOSE 10 G: 10 SOLUTION ORAL at 21:20

## 2020-01-01 RX ADMIN — SODIUM CHLORIDE SOLN NEBU 3% 4 ML: 3 NEBU SOLN at 18:41

## 2020-01-01 RX ADMIN — DOCUSATE SODIUM 100 MG: 100 CAPSULE, LIQUID FILLED ORAL at 08:37

## 2020-01-01 RX ADMIN — GABAPENTIN 100 MG: 100 CAPSULE ORAL at 21:15

## 2020-01-01 RX ADMIN — CALCIUM CHLORIDE 1 G: 100 INJECTION, SOLUTION INTRAVENOUS at 16:39

## 2020-01-01 RX ADMIN — DOCUSATE SODIUM 100 MG: 100 CAPSULE, LIQUID FILLED ORAL at 21:39

## 2020-01-01 RX ADMIN — SODIUM CHLORIDE: 9 INJECTION, SOLUTION INTRAVENOUS at 23:00

## 2020-01-01 RX ADMIN — SODIUM CHLORIDE 20 ML: 9 INJECTION, SOLUTION INTRAVENOUS at 17:19

## 2020-01-01 RX ADMIN — INSULIN LISPRO 1 UNITS: 100 INJECTION, SOLUTION INTRAVENOUS; SUBCUTANEOUS at 00:30

## 2020-01-01 RX ADMIN — INSULIN LISPRO 1 UNITS: 100 INJECTION, SOLUTION INTRAVENOUS; SUBCUTANEOUS at 21:09

## 2020-01-01 RX ADMIN — SODIUM CHLORIDE: 9 INJECTION, SOLUTION INTRAVENOUS at 16:21

## 2020-01-01 RX ADMIN — PROPOFOL 15 MCG/KG/MIN: 10 INJECTION, EMULSION INTRAVENOUS at 08:34

## 2020-01-01 RX ADMIN — SERTRALINE HYDROCHLORIDE 100 MG: 100 TABLET ORAL at 08:59

## 2020-01-01 RX ADMIN — ONDANSETRON 4 MG: 2 INJECTION INTRAMUSCULAR; INTRAVENOUS at 00:14

## 2020-01-01 RX ADMIN — LEVOFLOXACIN 750 MG: 750 TABLET, FILM COATED ORAL at 09:19

## 2020-01-01 RX ADMIN — GABAPENTIN 100 MG: 100 CAPSULE ORAL at 15:03

## 2020-01-01 RX ADMIN — AZITHROMYCIN 250 MG: 250 TABLET, FILM COATED ORAL at 09:12

## 2020-01-01 RX ADMIN — SODIUM CHLORIDE SOLN NEBU 3% 4 ML: 3 NEBU SOLN at 10:12

## 2020-01-01 RX ADMIN — SPIRONOLACTONE 100 MG: 25 TABLET ORAL at 09:19

## 2020-01-01 RX ADMIN — SODIUM CHLORIDE, PRESERVATIVE FREE 10 ML: 5 INJECTION INTRAVENOUS at 21:46

## 2020-01-01 RX ADMIN — PANTOPRAZOLE SODIUM 40 MG: 40 INJECTION, POWDER, FOR SOLUTION INTRAVENOUS at 08:32

## 2020-01-01 RX ADMIN — LACTULOSE 10 G: 10 SOLUTION ORAL at 08:52

## 2020-01-01 RX ADMIN — INSULIN LISPRO 1 UNITS: 100 INJECTION, SOLUTION INTRAVENOUS; SUBCUTANEOUS at 09:13

## 2020-01-01 RX ADMIN — CHLORHEXIDINE GLUCONATE 0.12% ORAL RINSE 15 ML: 1.2 LIQUID ORAL at 20:06

## 2020-01-01 RX ADMIN — LACTULOSE 10 G: 10 SOLUTION ORAL at 08:37

## 2020-01-01 RX ADMIN — ZINC SULFATE 220 MG (50 MG) CAPSULE 50 MG: CAPSULE at 09:06

## 2020-01-01 RX ADMIN — POTASSIUM CHLORIDE 20 MEQ: 400 INJECTION, SOLUTION INTRAVENOUS at 15:01

## 2020-01-01 RX ADMIN — LEVOTHYROXINE SODIUM 50 MCG: 50 TABLET ORAL at 06:44

## 2020-01-01 RX ADMIN — LACTULOSE 10 G: 10 SOLUTION ORAL at 09:14

## 2020-01-01 RX ADMIN — METHYLPREDNISOLONE SODIUM SUCCINATE 60 MG: 125 INJECTION, POWDER, FOR SOLUTION INTRAMUSCULAR; INTRAVENOUS at 14:51

## 2020-01-01 RX ADMIN — HYDROCORTISONE SODIUM SUCCINATE 50 MG: 100 INJECTION, POWDER, FOR SOLUTION INTRAMUSCULAR; INTRAVENOUS at 21:05

## 2020-01-01 RX ADMIN — SODIUM CHLORIDE, PRESERVATIVE FREE 10 ML: 5 INJECTION INTRAVENOUS at 20:32

## 2020-01-01 RX ADMIN — PIPERACILLIN AND TAZOBACTAM 3.38 G: 3; .375 INJECTION, POWDER, FOR SOLUTION INTRAVENOUS at 06:44

## 2020-01-01 RX ADMIN — NADOLOL 10 MG: 20 TABLET ORAL at 11:54

## 2020-01-01 RX ADMIN — Medication 20 MCG/MIN: at 14:15

## 2020-01-01 RX ADMIN — DEXAMETHASONE SODIUM PHOSPHATE 10 MG: 10 INJECTION, SOLUTION INTRAMUSCULAR; INTRAVENOUS at 12:11

## 2020-01-01 RX ADMIN — FUROSEMIDE 20 MG: 10 INJECTION, SOLUTION INTRAVENOUS at 08:53

## 2020-01-01 RX ADMIN — GABAPENTIN 100 MG: 100 CAPSULE ORAL at 08:59

## 2020-01-01 RX ADMIN — PANTOPRAZOLE SODIUM 40 MG: 40 TABLET, DELAYED RELEASE ORAL at 21:20

## 2020-01-01 RX ADMIN — SERTRALINE HYDROCHLORIDE 100 MG: 100 TABLET ORAL at 10:04

## 2020-01-01 RX ADMIN — SODIUM CHLORIDE: 9 INJECTION, SOLUTION INTRAVENOUS at 21:50

## 2020-01-01 RX ADMIN — PANTOPRAZOLE SODIUM 40 MG: 40 INJECTION, POWDER, FOR SOLUTION INTRAVENOUS at 20:31

## 2020-01-01 RX ADMIN — POTASSIUM CHLORIDE 20 MEQ: 400 INJECTION, SOLUTION INTRAVENOUS at 10:31

## 2020-01-01 RX ADMIN — INSULIN LISPRO 1 UNITS: 100 INJECTION, SOLUTION INTRAVENOUS; SUBCUTANEOUS at 17:16

## 2020-01-01 RX ADMIN — HYDROCORTISONE SODIUM SUCCINATE 100 MG: 100 INJECTION, POWDER, FOR SOLUTION INTRAMUSCULAR; INTRAVENOUS at 05:30

## 2020-01-01 RX ADMIN — HYDROCORTISONE SODIUM SUCCINATE 100 MG: 100 INJECTION, POWDER, FOR SOLUTION INTRAMUSCULAR; INTRAVENOUS at 20:41

## 2020-01-01 RX ADMIN — METHYLPREDNISOLONE SODIUM SUCCINATE 60 MG: 125 INJECTION, POWDER, FOR SOLUTION INTRAMUSCULAR; INTRAVENOUS at 08:13

## 2020-01-01 RX ADMIN — INSULIN LISPRO 1 UNITS: 100 INJECTION, SOLUTION INTRAVENOUS; SUBCUTANEOUS at 23:37

## 2020-01-01 RX ADMIN — LEVOFLOXACIN 750 MG: 750 TABLET, FILM COATED ORAL at 08:52

## 2020-01-01 RX ADMIN — INSULIN LISPRO 1 UNITS: 100 INJECTION, SOLUTION INTRAVENOUS; SUBCUTANEOUS at 08:38

## 2020-01-01 RX ADMIN — DOCUSATE SODIUM 100 MG: 100 CAPSULE, LIQUID FILLED ORAL at 16:04

## 2020-01-01 RX ADMIN — FUROSEMIDE 40 MG: 40 TABLET ORAL at 08:59

## 2020-01-01 RX ADMIN — PIPERACILLIN AND TAZOBACTAM 3.38 G: 3; .375 INJECTION, POWDER, FOR SOLUTION INTRAVENOUS at 18:30

## 2020-01-01 RX ADMIN — GABAPENTIN 100 MG: 100 CAPSULE ORAL at 21:39

## 2020-01-01 RX ADMIN — ENOXAPARIN SODIUM 40 MG: 40 INJECTION SUBCUTANEOUS at 09:09

## 2020-01-01 RX ADMIN — PANTOPRAZOLE SODIUM 40 MG: 40 TABLET, DELAYED RELEASE ORAL at 10:03

## 2020-01-01 RX ADMIN — LEVOTHYROXINE SODIUM 50 MCG: 50 TABLET ORAL at 05:53

## 2020-01-01 RX ADMIN — DOCUSATE SODIUM 100 MG: 100 CAPSULE, LIQUID FILLED ORAL at 10:03

## 2020-01-01 RX ADMIN — MIDAZOLAM HYDROCHLORIDE 2 MG: 1 INJECTION INTRAMUSCULAR; INTRAVENOUS at 15:13

## 2020-01-01 RX ADMIN — PIPERACILLIN AND TAZOBACTAM 3.38 G: 3; .375 INJECTION, POWDER, FOR SOLUTION INTRAVENOUS at 06:47

## 2020-01-01 RX ADMIN — PANTOPRAZOLE SODIUM 40 MG: 40 TABLET, DELAYED RELEASE ORAL at 21:15

## 2020-01-01 RX ADMIN — INSULIN LISPRO 2 UNITS: 100 INJECTION, SOLUTION INTRAVENOUS; SUBCUTANEOUS at 20:13

## 2020-01-01 RX ADMIN — GABAPENTIN 100 MG: 100 CAPSULE ORAL at 09:13

## 2020-01-01 RX ADMIN — LIDOCAINE HYDROCHLORIDE 100 MG: 20 INJECTION, SOLUTION INTRAVENOUS at 12:11

## 2020-01-01 RX ADMIN — NADOLOL 10 MG: 20 TABLET ORAL at 09:07

## 2020-01-01 RX ADMIN — SODIUM CHLORIDE SOLN NEBU 3% 4 ML: 3 NEBU SOLN at 16:44

## 2020-01-01 RX ADMIN — SERTRALINE HYDROCHLORIDE 100 MG: 100 TABLET ORAL at 08:53

## 2020-01-01 RX ADMIN — PANTOPRAZOLE SODIUM 40 MG: 40 TABLET, DELAYED RELEASE ORAL at 23:28

## 2020-01-01 RX ADMIN — GABAPENTIN 100 MG: 100 CAPSULE ORAL at 20:46

## 2020-01-01 RX ADMIN — PROPOFOL 20 MCG/KG/MIN: 10 INJECTION, EMULSION INTRAVENOUS at 14:57

## 2020-01-01 RX ADMIN — GABAPENTIN 100 MG: 100 CAPSULE ORAL at 21:25

## 2020-01-01 RX ADMIN — LACTULOSE 10 G: 10 SOLUTION ORAL at 23:29

## 2020-01-01 RX ADMIN — INSULIN LISPRO 3 UNITS: 100 INJECTION, SOLUTION INTRAVENOUS; SUBCUTANEOUS at 21:04

## 2020-01-01 RX ADMIN — LACTULOSE 10 G: 10 SOLUTION ORAL at 20:45

## 2020-01-01 RX ADMIN — PROPOFOL 15 MCG/KG/MIN: 10 INJECTION, EMULSION INTRAVENOUS at 14:55

## 2020-01-01 RX ADMIN — ZINC SULFATE 220 MG (50 MG) CAPSULE 50 MG: CAPSULE at 09:18

## 2020-01-01 RX ADMIN — CHOLESTYRAMINE 4 G: 4 POWDER, FOR SUSPENSION ORAL at 08:54

## 2020-01-01 RX ADMIN — TRAZODONE HYDROCHLORIDE 100 MG: 50 TABLET ORAL at 21:07

## 2020-01-01 RX ADMIN — GABAPENTIN 100 MG: 100 CAPSULE ORAL at 15:35

## 2020-01-01 RX ADMIN — PANTOPRAZOLE SODIUM 40 MG: 40 TABLET, DELAYED RELEASE ORAL at 21:25

## 2020-01-01 RX ADMIN — CHOLESTYRAMINE 4 G: 4 POWDER, FOR SUSPENSION ORAL at 08:15

## 2020-01-01 RX ADMIN — PIPERACILLIN AND TAZOBACTAM 3.38 G: 3; .375 INJECTION, POWDER, FOR SOLUTION INTRAVENOUS at 06:31

## 2020-01-01 RX ADMIN — FUROSEMIDE 40 MG: 40 TABLET ORAL at 09:12

## 2020-01-01 RX ADMIN — CHLORHEXIDINE GLUCONATE 0.12% ORAL RINSE 15 ML: 1.2 LIQUID ORAL at 21:46

## 2020-01-01 RX ADMIN — INSULIN LISPRO 3 UNITS: 100 INJECTION, SOLUTION INTRAVENOUS; SUBCUTANEOUS at 08:40

## 2020-01-01 RX ADMIN — FENTANYL CITRATE 150 MCG: 50 INJECTION, SOLUTION INTRAMUSCULAR; INTRAVENOUS at 15:12

## 2020-01-01 RX ADMIN — FENTANYL CITRATE 150 MCG: 50 INJECTION, SOLUTION INTRAMUSCULAR; INTRAVENOUS at 12:11

## 2020-01-01 RX ADMIN — GABAPENTIN 100 MG: 100 CAPSULE ORAL at 09:18

## 2020-01-01 RX ADMIN — INSULIN LISPRO 3 UNITS: 100 INJECTION, SOLUTION INTRAVENOUS; SUBCUTANEOUS at 17:41

## 2020-01-01 RX ADMIN — PIPERACILLIN AND TAZOBACTAM 3.38 G: 3; .375 INJECTION, POWDER, FOR SOLUTION INTRAVENOUS at 20:36

## 2020-01-01 RX ADMIN — TRAZODONE HYDROCHLORIDE 100 MG: 50 TABLET ORAL at 21:39

## 2020-01-01 RX ADMIN — FAMOTIDINE 20 MG: 10 INJECTION INTRAVENOUS at 09:58

## 2020-01-01 RX ADMIN — GABAPENTIN 100 MG: 100 CAPSULE ORAL at 13:41

## 2020-01-01 RX ADMIN — DOCUSATE SODIUM 100 MG: 100 CAPSULE, LIQUID FILLED ORAL at 08:14

## 2020-01-01 RX ADMIN — CALCIUM CARBONATE (ANTACID) CHEW TAB 500 MG 500 MG: 500 CHEW TAB at 10:47

## 2020-01-01 RX ADMIN — GABAPENTIN 100 MG: 100 CAPSULE ORAL at 09:06

## 2020-01-01 RX ADMIN — SODIUM CHLORIDE SOLN NEBU 3% 4 ML: 3 NEBU SOLN at 19:17

## 2020-01-01 RX ADMIN — PROPOFOL 15 MCG/KG/MIN: 10 INJECTION, EMULSION INTRAVENOUS at 19:33

## 2020-01-01 RX ADMIN — INSULIN LISPRO 1 UNITS: 100 INJECTION, SOLUTION INTRAVENOUS; SUBCUTANEOUS at 12:12

## 2020-01-01 RX ADMIN — SODIUM CHLORIDE SOLN NEBU 3% 4 ML: 3 NEBU SOLN at 06:08

## 2020-01-01 RX ADMIN — SODIUM CHLORIDE: 9 INJECTION, SOLUTION INTRAVENOUS at 11:09

## 2020-01-01 RX ADMIN — ROCURONIUM BROMIDE 50 MG: 10 SOLUTION INTRAVENOUS at 12:11

## 2020-01-01 RX ADMIN — GABAPENTIN 100 MG: 100 CAPSULE ORAL at 16:48

## 2020-01-01 RX ADMIN — ZINC SULFATE 220 MG (50 MG) CAPSULE 50 MG: CAPSULE at 08:59

## 2020-01-01 RX ADMIN — ENOXAPARIN SODIUM 40 MG: 40 INJECTION SUBCUTANEOUS at 13:16

## 2020-01-01 RX ADMIN — WATER 1 G: 1 INJECTION INTRAMUSCULAR; INTRAVENOUS; SUBCUTANEOUS at 16:48

## 2020-01-01 RX ADMIN — PANTOPRAZOLE SODIUM 40 MG: 40 TABLET, DELAYED RELEASE ORAL at 00:25

## 2020-01-01 RX ADMIN — SODIUM BICARBONATE: 84 INJECTION, SOLUTION INTRAVENOUS at 20:05

## 2020-01-01 RX ADMIN — HYDROCORTISONE SODIUM SUCCINATE 100 MG: 100 INJECTION, POWDER, FOR SOLUTION INTRAMUSCULAR; INTRAVENOUS at 05:36

## 2020-01-01 RX ADMIN — DOCUSATE SODIUM 100 MG: 100 CAPSULE, LIQUID FILLED ORAL at 21:20

## 2020-01-01 RX ADMIN — CHLORHEXIDINE GLUCONATE 0.12% ORAL RINSE 15 ML: 1.2 LIQUID ORAL at 20:22

## 2020-01-01 RX ADMIN — SODIUM CHLORIDE: 9 INJECTION, SOLUTION INTRAVENOUS at 23:10

## 2020-01-01 RX ADMIN — GUAIFENESIN 1200 MG: 600 TABLET, EXTENDED RELEASE ORAL at 21:40

## 2020-01-01 RX ADMIN — HYDROCODONE BITARTRATE AND ACETAMINOPHEN 1 TABLET: 5; 325 TABLET ORAL at 06:28

## 2020-01-01 RX ADMIN — SERTRALINE HYDROCHLORIDE 100 MG: 100 TABLET ORAL at 08:36

## 2020-01-01 RX ADMIN — PIPERACILLIN AND TAZOBACTAM 3.38 G: 3; .375 INJECTION, POWDER, FOR SOLUTION INTRAVENOUS at 18:00

## 2020-01-01 ASSESSMENT — PULMONARY FUNCTION TESTS
PIF_VALUE: 29
PIF_VALUE: 33
PIF_VALUE: 34
PIF_VALUE: 32
PIF_VALUE: 45
PIF_VALUE: 38
PIF_VALUE: 28
PIF_VALUE: 35
PIF_VALUE: 31
PIF_VALUE: 27
PIF_VALUE: 30
PIF_VALUE: 30
PIF_VALUE: 33
PIF_VALUE: 36
PIF_VALUE: 34
PIF_VALUE: 35
PIF_VALUE: 37
PIF_VALUE: 34
PIF_VALUE: 33
PIF_VALUE: 32
PIF_VALUE: 30
PIF_VALUE: 34
PIF_VALUE: 32
PIF_VALUE: 34
PIF_VALUE: 35
PIF_VALUE: 34
PIF_VALUE: 40
PIF_VALUE: 33
PIF_VALUE: 30
PIF_VALUE: 35
PIF_VALUE: 33
PIF_VALUE: 28
PIF_VALUE: 31
PIF_VALUE: 33
PIF_VALUE: 30
PIF_VALUE: 3
PIF_VALUE: 31
PIF_VALUE: 30
PIF_VALUE: 2
PIF_VALUE: 30
PIF_VALUE: 32
PIF_VALUE: 28
PIF_VALUE: 33
PIF_VALUE: 30
PIF_VALUE: 50
PIF_VALUE: 33
PIF_VALUE: 30
PIF_VALUE: 35
PIF_VALUE: 23
PIF_VALUE: 37
PIF_VALUE: 29
PIF_VALUE: 31
PIF_VALUE: 39
PIF_VALUE: 33
PIF_VALUE: 31
PIF_VALUE: 32
PIF_VALUE: 29
PIF_VALUE: 34
PIF_VALUE: 37
PIF_VALUE: 34
PIF_VALUE: 42
PIF_VALUE: 33
PIF_VALUE: 30
PIF_VALUE: 39
PIF_VALUE: 30
PIF_VALUE: 26
PIF_VALUE: 36
PIF_VALUE: 29
PIF_VALUE: 30
PIF_VALUE: 33
PIF_VALUE: 33
PIF_VALUE: 36
PIF_VALUE: 30
PIF_VALUE: 26
PIF_VALUE: 41
PIF_VALUE: 33
PIF_VALUE: 38
PIF_VALUE: 50
PIF_VALUE: 40
PIF_VALUE: 26
PIF_VALUE: 32
PIF_VALUE: 30
PIF_VALUE: 38
PIF_VALUE: 33
PIF_VALUE: 1
PIF_VALUE: 29
PIF_VALUE: 34
PIF_VALUE: 39
PIF_VALUE: 29
PIF_VALUE: 28
PIF_VALUE: 30
PIF_VALUE: 29
PIF_VALUE: 36
PIF_VALUE: 41
PIF_VALUE: 33
PIF_VALUE: 26
PIF_VALUE: 32
PIF_VALUE: 31
PIF_VALUE: 30
PIF_VALUE: 32
PIF_VALUE: 37
PIF_VALUE: 32
PIF_VALUE: 31
PIF_VALUE: 41
PIF_VALUE: 39
PIF_VALUE: 30
PIF_VALUE: 35
PIF_VALUE: 30
PIF_VALUE: 34
PIF_VALUE: 22
PIF_VALUE: 0
PIF_VALUE: 35
PIF_VALUE: 31
PIF_VALUE: 28
PIF_VALUE: 29
PIF_VALUE: 28
PIF_VALUE: 39
PIF_VALUE: 34
PIF_VALUE: 33
PIF_VALUE: 1
PIF_VALUE: 35
PIF_VALUE: 38
PIF_VALUE: 28
PIF_VALUE: 32
PIF_VALUE: 30
PIF_VALUE: 29
PIF_VALUE: 26
PIF_VALUE: 29
PIF_VALUE: 31
PIF_VALUE: 32
PIF_VALUE: 35
PIF_VALUE: 38
PIF_VALUE: 32
PIF_VALUE: 37
PIF_VALUE: 30
PIF_VALUE: 1
PIF_VALUE: 25
PIF_VALUE: 32
PIF_VALUE: 39
PIF_VALUE: 29
PIF_VALUE: 28
PIF_VALUE: 1
PIF_VALUE: 1
PIF_VALUE: 31
PIF_VALUE: 35
PIF_VALUE: 28
PIF_VALUE: 24
PIF_VALUE: 42
PIF_VALUE: 34
PIF_VALUE: 19
PIF_VALUE: 33
PIF_VALUE: 29
PIF_VALUE: 31
PIF_VALUE: 30
PIF_VALUE: 33
PIF_VALUE: 27
PIF_VALUE: 37
PIF_VALUE: 35
PIF_VALUE: 37
PIF_VALUE: 45
PIF_VALUE: 33
PIF_VALUE: 4
PIF_VALUE: 2
PIF_VALUE: 39
PIF_VALUE: 34
PIF_VALUE: 30
PIF_VALUE: 28
PIF_VALUE: 25
PIF_VALUE: 34
PIF_VALUE: 28
PIF_VALUE: 1
PIF_VALUE: 31
PIF_VALUE: 28
PIF_VALUE: 36
PIF_VALUE: 44
PIF_VALUE: 35
PIF_VALUE: 37
PIF_VALUE: 33
PIF_VALUE: 41
PIF_VALUE: 32
PIF_VALUE: 1
PIF_VALUE: 50
PIF_VALUE: 30
PIF_VALUE: 32
PIF_VALUE: 32
PIF_VALUE: 39
PIF_VALUE: 33
PIF_VALUE: 30
PIF_VALUE: 3
PIF_VALUE: 34
PIF_VALUE: 29
PIF_VALUE: 36
PIF_VALUE: 31
PIF_VALUE: 29
PIF_VALUE: 30
PIF_VALUE: 31
PIF_VALUE: 30
PIF_VALUE: 30
PIF_VALUE: 29
PIF_VALUE: 47
PIF_VALUE: 39
PIF_VALUE: 36
PIF_VALUE: 32
PIF_VALUE: 40
PIF_VALUE: 37
PIF_VALUE: 35
PIF_VALUE: 33
PIF_VALUE: 29
PIF_VALUE: 37
PIF_VALUE: 30
PIF_VALUE: 0
PIF_VALUE: 28
PIF_VALUE: 37
PIF_VALUE: 31
PIF_VALUE: 40
PIF_VALUE: 35
PIF_VALUE: 28
PIF_VALUE: 34
PIF_VALUE: 29
PIF_VALUE: 36
PIF_VALUE: 3
PIF_VALUE: 34
PIF_VALUE: 1
PIF_VALUE: 34
PIF_VALUE: 32
PIF_VALUE: 33
PIF_VALUE: 37
PIF_VALUE: 31
PIF_VALUE: 27
PIF_VALUE: 34
PIF_VALUE: 32
PIF_VALUE: 33
PIF_VALUE: 29
PIF_VALUE: 30
PIF_VALUE: 26
PIF_VALUE: 35
PIF_VALUE: 30
PIF_VALUE: 27
PIF_VALUE: 38
PIF_VALUE: 33
PIF_VALUE: 38
PIF_VALUE: 29
PIF_VALUE: 33
PIF_VALUE: 40
PIF_VALUE: 31
PIF_VALUE: 37
PIF_VALUE: 42
PIF_VALUE: 31
PIF_VALUE: 29
PIF_VALUE: 42
PIF_VALUE: 38
PIF_VALUE: 34
PIF_VALUE: 29
PIF_VALUE: 35
PIF_VALUE: 26
PIF_VALUE: 30
PIF_VALUE: 28
PIF_VALUE: 32
PIF_VALUE: 30
PIF_VALUE: 31
PIF_VALUE: 33
PIF_VALUE: 23
PIF_VALUE: 43
PIF_VALUE: 29
PIF_VALUE: 26
PIF_VALUE: 2
PIF_VALUE: 29
PIF_VALUE: 35
PIF_VALUE: 32
PIF_VALUE: 31
PIF_VALUE: 26
PIF_VALUE: 29
PIF_VALUE: 32
PIF_VALUE: 26
PIF_VALUE: 34
PIF_VALUE: 29
PIF_VALUE: 32
PIF_VALUE: 33
PIF_VALUE: 34
PIF_VALUE: 33
PIF_VALUE: 3
PIF_VALUE: 41
PIF_VALUE: 36
PIF_VALUE: 29
PIF_VALUE: 41
PIF_VALUE: 30

## 2020-01-01 ASSESSMENT — PAIN SCALES - GENERAL
PAINLEVEL_OUTOF10: 0
PAINLEVEL_OUTOF10: 8
PAINLEVEL_OUTOF10: 0
PAINLEVEL_OUTOF10: 5
PAINLEVEL_OUTOF10: 0
PAINLEVEL_OUTOF10: 6
PAINLEVEL_OUTOF10: 0
PAINLEVEL_OUTOF10: 0
PAINLEVEL_OUTOF10: 8
PAINLEVEL_OUTOF10: 0
PAINLEVEL_OUTOF10: 8
PAINLEVEL_OUTOF10: 0
PAINLEVEL_OUTOF10: 8
PAINLEVEL_OUTOF10: 0
PAINLEVEL_OUTOF10: 6
PAINLEVEL_OUTOF10: 0
PAINLEVEL_OUTOF10: 8
PAINLEVEL_OUTOF10: 0
PAINLEVEL_OUTOF10: 0
PAINLEVEL_OUTOF10: 7
PAINLEVEL_OUTOF10: 0
PAINLEVEL_OUTOF10: 10
PAINLEVEL_OUTOF10: 0
PAINLEVEL_OUTOF10: 4
PAINLEVEL_OUTOF10: 0
PAINLEVEL_OUTOF10: 5
PAINLEVEL_OUTOF10: 0
PAINLEVEL_OUTOF10: 3
PAINLEVEL_OUTOF10: 0
PAINLEVEL_OUTOF10: 6
PAINLEVEL_OUTOF10: 0
PAINLEVEL_OUTOF10: 4
PAINLEVEL_OUTOF10: 0

## 2020-01-01 ASSESSMENT — PAIN DESCRIPTION - PAIN TYPE
TYPE: ACUTE PAIN
TYPE_3: ACUTE PAIN
TYPE: CHRONIC PAIN
TYPE: CHRONIC PAIN
TYPE: ACUTE PAIN
TYPE: CHRONIC PAIN

## 2020-01-01 ASSESSMENT — PAIN DESCRIPTION - ORIENTATION
ORIENTATION: RIGHT
ORIENTATION_3: LEFT
ORIENTATION: LOWER;MID
ORIENTATION: RIGHT
ORIENTATION: RIGHT
ORIENTATION: LOWER;MID
ORIENTATION: LEFT
ORIENTATION_2: RIGHT;ANTERIOR;UPPER

## 2020-01-01 ASSESSMENT — PAIN DESCRIPTION - DESCRIPTORS
DESCRIPTORS: ACHING
DESCRIPTORS: DISCOMFORT
DESCRIPTORS: ACHING;DISCOMFORT
DESCRIPTORS_2: ACHING
DESCRIPTORS: ACHING
DESCRIPTORS: ACHING;DISCOMFORT
DESCRIPTORS_3: ACHING
DESCRIPTORS: ACHING;CONSTANT;DISCOMFORT
DESCRIPTORS: DISCOMFORT;SORE;TIGHTNESS
DESCRIPTORS: ACHING;DISCOMFORT;SORE

## 2020-01-01 ASSESSMENT — PAIN DESCRIPTION - LOCATION
LOCATION: GENERALIZED
LOCATION: CHEST
LOCATION_3: ANKLE
LOCATION: BACK
LOCATION: GENERALIZED
LOCATION: ANKLE
LOCATION_2: LEG
LOCATION: RIB CAGE;CHEST;ABDOMEN
LOCATION: BACK

## 2020-01-01 ASSESSMENT — PAIN DESCRIPTION - PROGRESSION
CLINICAL_PROGRESSION: GRADUALLY IMPROVING
CLINICAL_PROGRESSION: GRADUALLY WORSENING
CLINICAL_PROGRESSION: GRADUALLY WORSENING
CLINICAL_PROGRESSION: GRADUALLY IMPROVING
CLINICAL_PROGRESSION: GRADUALLY WORSENING
CLINICAL_PROGRESSION: GRADUALLY IMPROVING

## 2020-01-01 ASSESSMENT — ENCOUNTER SYMPTOMS
NAUSEA: 0
SPUTUM PRODUCTION: 0
BACK PAIN: 0
SHORTNESS OF BREATH: 1
DIARRHEA: 0
SORE THROAT: 0
ABDOMINAL PAIN: 0
COUGH: 0
SWOLLEN GLANDS: 0
CONSTIPATION: 0
WHEEZING: 0
VOMITING: 0
CHEST TIGHTNESS: 0

## 2020-01-01 ASSESSMENT — PAIN DESCRIPTION - ONSET
ONSET: GRADUAL
ONSET: GRADUAL
ONSET: AWAKENED FROM SLEEP
ONSET: ON-GOING
ONSET: GRADUAL

## 2020-01-01 ASSESSMENT — PAIN DESCRIPTION - FREQUENCY
FREQUENCY: INTERMITTENT

## 2020-01-01 ASSESSMENT — PAIN DESCRIPTION - DURATION
DURATION_2: INTERMITTENT
DURATION_3: CONTINUOUS

## 2020-01-01 ASSESSMENT — PAIN - FUNCTIONAL ASSESSMENT
PAIN_FUNCTIONAL_ASSESSMENT: PREVENTS OR INTERFERES SOME ACTIVE ACTIVITIES AND ADLS
PAIN_FUNCTIONAL_ASSESSMENT: PREVENTS OR INTERFERES SOME ACTIVE ACTIVITIES AND ADLS

## 2020-01-01 ASSESSMENT — PAIN DESCRIPTION - INTENSITY
RATING_2: 0
RATING_2: 10

## 2020-04-14 PROBLEM — M62.82 RHABDOMYOLYSIS: Status: ACTIVE | Noted: 2020-01-01

## 2020-04-14 NOTE — PROGRESS NOTES
Spoke to Dr Mary Carmen De La Rosa about consult. States he will see the patient tomorrow.  No new orders at this time

## 2020-04-14 NOTE — CONSULTS
Occupational History    None   Social Needs    Financial resource strain: None    Food insecurity     Worry: None     Inability: None    Transportation needs     Medical: None     Non-medical: None   Tobacco Use    Smoking status: Never Smoker    Smokeless tobacco: Never Used   Substance and Sexual Activity    Alcohol use: No    Drug use: No    Sexual activity: None   Lifestyle    Physical activity     Days per week: None     Minutes per session: None    Stress: None   Relationships    Social connections     Talks on phone: None     Gets together: None     Attends Muslim service: None     Active member of club or organization: None     Attends meetings of clubs or organizations: None     Relationship status: None    Intimate partner violence     Fear of current or ex partner: None     Emotionally abused: None     Physically abused: None     Forced sexual activity: None   Other Topics Concern    None   Social History Narrative    None     ·      PHYSICAL EXAM    (up to 7 for level 4, 8 or more forlevel 5)     ED Triage Vitals [04/14/20 0751]   BP Temp Temp Source Pulse Resp SpO2 Height Weight   (!) 152/56 97.6 °F (36.4 °C) Oral 80 28 92 % 5' 5\" (1.651 m) 244 lb 7 oz (110.9 kg)     Vitals:    Vitals:    04/14/20 1205 04/14/20 1315 04/14/20 1400 04/14/20 1441   BP: (!) 138/56 (!) 136/54 (!) 143/80 138/70   Pulse: 67 69 69 74   Resp: 18 19 17 17   Temp: 98.4 °F (36.9 °C) 98.4 °F (36.9 °C) 96.8 °F (36 °C) 97.2 °F (36.2 °C)   TempSrc:   Oral Oral   SpO2: 99% 99% 96%    Weight:    244 lb 4 oz (110.8 kg)   Height:    5' 5\" (1.651 m)     Physical Exam   Constitutional/General: Alert and oriented, NAD inc bmi  Head: NC/AT  Eyes: PERRL, EOMI  Mouth: Normal mucosa, no thrush   Neck: Supple, full ROM,    Pulmonary: Lungs clear to auscultation bilaterally. Not in respiratory distress  Cardiovascular:  Regular rate and rhythm, no murmurs, gallops, or rubs. Abdomen: Soft, + BS.   distension. Nontender.

## 2020-04-14 NOTE — ED PROVIDER NOTES
Patient presents with complaint of chronic right thigh pain that began in December. She states she has been falling due to weakness in the legs. She also notes an increase in SOB especially when lying flat. She admits to b/l chronic lower extremity edema. She reports no fevers, chills, or cough. She has no known heart disease. Previous history of anemia and clots. She states no blood in her stool. She admits to increased weakness and fatigue. The history is provided by the patient. Shortness of Breath   Severity:  Moderate  Onset quality:  Gradual  Duration:  2 months  Timing:  Constant  Progression:  Worsening  Chronicity:  New  Context: activity    Context comment:  Position changes  Relieved by:  None tried  Worsened by: Activity (lying down)  Ineffective treatments:  None tried  Associated symptoms: chest pain    Associated symptoms: no abdominal pain, no cough, no diaphoresis, no fever, no neck pain, no sore throat, no sputum production, no swollen glands, no vomiting and no wheezing    Risk factors: hx of PE/DVT and obesity        Review of Systems   Constitutional: Positive for activity change and fatigue. Negative for appetite change, diaphoresis and fever. HENT: Negative for sore throat. Respiratory: Positive for shortness of breath. Negative for cough, sputum production, chest tightness and wheezing. Cardiovascular: Positive for chest pain. Gastrointestinal: Negative for abdominal pain, constipation, diarrhea, nausea and vomiting. Genitourinary: Negative for dysuria. Musculoskeletal: Positive for arthralgias and myalgias. Negative for back pain and neck pain. Skin: Negative. Neurological: Positive for weakness. Negative for light-headedness. Physical Exam  Vitals signs and nursing note reviewed. Constitutional:       General: She is not in acute distress. Appearance: She is well-developed. She is obese. She is ill-appearing. She is not toxic-appearing.    HENT: - 16 mmol/L    Glucose 113 (H) 74 - 99 mg/dL    BUN 13 8 - 23 mg/dL    CREATININE 0.9 0.5 - 1.0 mg/dL    GFR Non-African American >60 >=60 mL/min/1.73    GFR African American >60     Calcium 8.5 (L) 8.6 - 10.2 mg/dL   CBC   Result Value Ref Range    WBC 3.1 (L) 4.5 - 11.5 E9/L    RBC 3.42 (L) 3.50 - 5.50 E12/L    Hemoglobin 7.6 (L) 11.5 - 15.5 g/dL    Hematocrit 26.8 (L) 34.0 - 48.0 %    MCV 78.4 (L) 80.0 - 99.9 fL    MCH 22.2 (L) 26.0 - 35.0 pg    MCHC 28.4 (L) 32.0 - 34.5 %    RDW 17.9 (H) 11.5 - 15.0 fL    Platelets 88 (L) 407 - 450 E9/L    MPV 11.1 7.0 - 12.0 fL   Troponin   Result Value Ref Range    Troponin 0.02 0.00 - 0.03 ng/mL   Brain Natriuretic Peptide   Result Value Ref Range    Pro-BNP 1,851 (H) 0 - 125 pg/mL   TSH without Reflex   Result Value Ref Range    TSH 8.430 (H) 0.270 - 4.200 uIU/mL   CK   Result Value Ref Range    Total CK 2,449 (H) 20 - 180 U/L   Platelet Confirmation   Result Value Ref Range    Platelet Confirmation CONFIRMED    Hepatic Function Panel   Result Value Ref Range    Total Protein 6.8 6.4 - 8.3 g/dL    Alb 3.0 (L) 3.5 - 5.2 g/dL    Alkaline Phosphatase 242 (H) 35 - 104 U/L    ALT 32 0 - 32 U/L     (H) 0 - 31 U/L    Total Bilirubin 1.0 0.0 - 1.2 mg/dL    Bilirubin, Direct 0.3 0.0 - 0.3 mg/dL    Bilirubin, Indirect 0.7 0.0 - 1.0 mg/dL   Doctor Notification   Result Value Ref Range    DR. PATEL COMPL    Urinalysis   Result Value Ref Range    Color, UA Yellow Straw/Yellow    Clarity, UA Clear Clear    Glucose, Ur Negative Negative mg/dL    Bilirubin Urine Negative Negative    Ketones, Urine Negative Negative mg/dL    Specific Gravity, UA 1.015 1.005 - 1.030    Blood, Urine SMALL (A) Negative    pH, UA 7.0 5.0 - 9.0    Protein, UA Negative Negative mg/dL    Urobilinogen, Urine 0.2 <2.0 E.U./dL    Nitrite, Urine Negative Negative    Leukocyte Esterase, Urine TRACE (A) Negative   Microscopic Urinalysis   Result Value Ref Range    WBC, UA 0-1 0 - 5 /HPF    RBC, UA 0-1 0 - 2 /HPF

## 2020-04-14 NOTE — H&P
drugs. Family History:   family history is not on file. Non-contributory    PHYSICAL EXAM:  Vitals:  BP (!) 136/54   Pulse 69   Temp 98.4 °F (36.9 °C)   Resp 19   Ht 5' 5\" (1.651 m)   Wt 244 lb 7 oz (110.9 kg)   SpO2 99%   BMI 40.68 kg/m²     General Appearance: Ill appearing individual resting in bed comfortably, alert and oriented to person, place and time and in no acute distress  Skin: warm and dry  Head: normocephalic and atraumatic  Eyes: pupils equal, round, and reactive to light, extraocular eye movements intact, conjunctivae normal  Neck: neck supple and non tender without mass   Pulmonary/Chest: Left side crackles, no respiratory distress  Cardiovascular: normal rate, normal S1 and S2 and no carotid bruits  Abdomen: soft, non-tender, non-distended, normal bowel sounds, no masses or organomegaly  Extremities: no cyanosis, no clubbing and no edema  Neurologic: no cranial nerve deficit and speech normal    LABS:  Recent Labs     04/14/20  0826      K 5.0      CO2 28   BUN 13   CREATININE 0.9   GLUCOSE 113*   CALCIUM 8.5*       Recent Labs     04/14/20  0826   WBC 3.1*   RBC 3.42*   HGB 7.6*   HCT 26.8*   MCV 78.4*   MCH 22.2*   MCHC 28.4*   RDW 17.9*   PLT 88*   MPV 11.1       No results for input(s): POCGLU in the last 72 hours. Radiology: Cta Chest W Contrast    Result Date: 4/14/2020  EXAMINATION: CTA OF THE CHEST 4/14/2020 10:19 am TECHNIQUE: CTA of the chest was performed after the administration of intravenous contrast.  Multiplanar reformatted images are provided for review. MIP images are provided for review. Dose modulation, iterative reconstruction, and/or weight based adjustment of the mA/kV was utilized to reduce the radiation dose to as low as reasonably achievable.  COMPARISON: 01/20/2020, 09/29/2019 HISTORY: ORDERING SYSTEM PROVIDED HISTORY: DVT; no anticoagulation; CP and SOB TECHNOLOGIST PROVIDED HISTORY: Reason for exam:->DVT; no anticoagulation; CP and SOB FINDINGS: Pulmonary Arteries: Pulmonary arteries are adequately opacified for evaluation. No evidence of intraluminal filling defect to suggest pulmonary embolism. Main pulmonary artery is normal in caliber. Mediastinum: Aortic and coronary atherosclerosis. The thyroid is within normal limits. No pericardial effusion. The esophagus is within normal limits. No mediastinal adenopathy. Lungs/pleura: Moderate to large right pleural effusion. Trace left pleural effusion. Compressive atelectasis at the lung bases, without definite consolidation. Focal patchy ground-glass opacity in the left upper lobe as well as smaller, more peripheral ground-glass opacities in the left upper lobe. The central airways are grossly patent. Upper Abdomen: Splenomegaly. Small amount of ascites. The liver is mildly cirrhotic in appearance. Soft Tissues/Bones: No acute bone or soft tissue abnormality. 1. No evidence of pulmonary embolism. 2. New large patchy ground-glass opacity in the left upper lobe as well as smaller, more peripheral ground-glass opacities in the lingula. The differential favors an infectious process including viral and atypical pneumonia. 3. Moderate to large right pleural effusion. 4. Cirrhotic appearance of the liver with splenomegaly and a small amount of ascites. Us Dup Lower Extremity Right Aureliano    Result Date: 4/14/2020  EXAMINATION: DUPLEX VENOUS ULTRASOUND OF THE RIGHT LOWER EXTREMITY, 4/14/2020 10:37 am TECHNIQUE: Duplex ultrasound and Doppler images were obtained of the right lower extremity. COMPARISON: None. HISTORY: Reason for exam:->evaluate DVT Pain FINDINGS: The visualized veins of the right lower extremity are patent and free of echogenic thrombus. The veins are normally compressible and have normal phasic flow. No evidence of DVT in the right lower extremity. ASSESSMENT:      Active Problems:    Rhabdomyolysis  Resolved Problems:    * No resolved hospital problems. *      PLAN:    1. Acute respiratory failure with hypoxia-likely secondary to community-acquired pneumonia/suspected COVID-19: Afebrile; WBC 3.1. CTA ruled out pulmonary embolism however showed new large patchy groundglass opacity in the left upper lobe as well as smaller more peripheral groundglass opacities in lingula. Moderate to large pleural effusion also seen. COVID-19 test and work-up ordered. Strep and Legionella antigen ordered. Respiratory film array panel ordered. Continue IV azithromycin and IV ceftriaxone. Procalcitonin pending. Continue hydroxychloroquine. Currently, patient maintaining adequate SPO2 greater than 90% on 3 L supplemental oxygen. ID consulted. Pulmonology consulted. Continue to monitor clinically. 2. Rhabdomyolysis: Status post mechanical fall. CK 2449, elevated. Continue IV fluid hydration. Continue to monitor. 3. Pancytopenia: In setting of liver cirrhosis with sequestration likely causing thrombocytopenia. Follows Dr Hieu Elizabeth in outpatient setting as hematologist and in past HIV testing negative, peripheral blood flow cytometry is negative for B/T-cell lymphoma, LILAINA negative, Thony negative. Patient apparently has component of iron deficiency related anemia. Will continue to monitor CBC. 4. Transaminitis: In setting of liver cirrhosis history and likely secondary to viral infection. Will continue to monitor. 5. History of liver cirrhosis: ALT 32/ , elevated. On nadolol as patient reportedly has history of varices. Continue to monitor. 6. Diabetes mellitus: Hold metformin. On ISS. Continue to monitor blood sugars closely. 7. History of DVT: Ultrasound lower extremities and CTA imaging negative for blood clots. Not on anticoagulation. 8. Hyperlipidemia: Continue statin therapy  9. Hypothyroidism: Continue Synthroid  10.  Obesity: Counseled on lifestyle and dietary changes    Code Status: Full Code  DVT prophylaxis: SCDs    NOTE: This report was transcribed using voice recognition software.  Every effort was made to ensure accuracy; however, inadvertent computerized transcription errors may be present.     Electronically signed by Melissa Ruvalcaba MD on 4/14/2020 at 1:58 PM

## 2020-04-14 NOTE — PROGRESS NOTES
Notification of IV to PO conversion: This patient's order for azithromycin IV has been changed to azithromycin PO as approved by the [address] [place] [address] for suspected or confirmed COVID-19 patients who are NOT intubated. Please contact the Pharmacy with any questions. Thank you.

## 2020-04-15 PROBLEM — D50.9 IRON DEFICIENCY ANEMIA: Status: ACTIVE | Noted: 2020-01-01

## 2020-04-15 PROBLEM — R76.8 RED BLOOD CELL ANTIBODY POSITIVE: Chronic | Status: ACTIVE | Noted: 2020-01-01

## 2020-04-15 PROBLEM — J90 RECURRENT RIGHT PLEURAL EFFUSION: Status: ACTIVE | Noted: 2020-01-01

## 2020-04-15 NOTE — CONSULTS
(NEURONTIN) capsule 100 mg, 100 mg, Oral, TID  traZODone (DESYREL) tablet 100 mg, 100 mg, Oral, Nightly  furosemide (LASIX) tablet 20 mg, 20 mg, Oral, QAM  cholestyramine light packet 4 g, 4 g, Oral, Daily  levothyroxine (SYNTHROID) tablet 50 mcg, 50 mcg, Oral, QAM AC  acetaminophen (TYLENOL) tablet 650 mg, 650 mg, Oral, Q6H PRN **OR** acetaminophen (TYLENOL) suppository 650 mg, 650 mg, Rectal, Q6H PRN  [COMPLETED] hydroxychloroquine (PLAQUENIL) tablet 400 mg, 400 mg, Oral, BID **FOLLOWED BY** hydroxychloroquine (PLAQUENIL) tablet 200 mg, 200 mg, Oral, Q12H  cefTRIAXone (ROCEPHIN) 1 g in sterile water 10 mL IV syringe, 1 g, Intravenous, Q24H  zinc sulfate (ZINCATE) capsule 50 mg, 50 mg, Oral, Daily  azithromycin (ZITHROMAX) tablet 250 mg, 250 mg, Oral, Daily  0.9 % sodium chloride infusion, , Intravenous, Continuous  glucose (GLUTOSE) 40 % oral gel 15 g, 15 g, Oral, PRN  dextrose 50 % IV solution, 12.5 g, Intravenous, PRN  glucagon (rDNA) injection 1 mg, 1 mg, Intramuscular, PRN  dextrose 5 % solution, 100 mL/hr, Intravenous, PRN  insulin lispro (HUMALOG) injection vial 0-6 Units, 0-6 Units, Subcutaneous, TID WC  insulin lispro (HUMALOG) injection vial 0-3 Units, 0-3 Units, Subcutaneous, Nightly  Allergies:  Patient has no known allergies. Social History:    She is . Lives with her ex-. Has 2 children alive and well. Never smoked. Worked at [employer] in [employer]. Snoring: she has positive snoring, EDS. Had [place] Sleepiness Scale of 18. Did not complete PSG    Family History:   History reviewed. No pertinent family history.     REVIEW OF SYSTEMS:    CONSTITUTIONAL:  positive for  fatigue and malaise  RESPIRATORY:  positive for  dyspnea and orthopnea  CARDIOVASCULAR:  negative  Weakness in arms and legs    All other ROS negative    PHYSICAL EXAM:      Vitals:    /60   Pulse 66   Temp 97 °F (36.1 °C) (Axillary)   Resp 22   Ht 5' 5\" (1.651 m)   Wt 244 lb 4 oz (110.8 kg)   SpO2 100%   BMI 40.65 kg/m²     CONSTITUTIONAL:  awake, alert, cooperative, no apparent distress, and appears stated age  NECK:  no lymphadenopathy and no jugular venous distension    LUNGS:  diminished breath sounds throughout lungs  CARDIOVASCULAR:  Normal apical impulse, regular rate and rhythm, normal S1 and S2, no S3 or S4, and no murmur noted  ABDOMEN: soft, nondistended  EXTREMITIES: no cyanosis, clubbing or edema    DATA:    CBC with Differential:    Lab Results   Component Value Date    WBC 3.1 04/14/2020    RBC 3.42 04/14/2020    HGB 7.6 04/14/2020    HCT 26.8 04/14/2020    PLT 88 04/14/2020    MCV 78.4 04/14/2020    MCH 22.2 04/14/2020    MCHC 28.4 04/14/2020    RDW 17.9 04/14/2020    NRBC 1 04/14/2015    SEGSPCT 54 05/29/2013    LYMPHOPCT 18.3 10/02/2019    MONOPCT 4.3 10/02/2019    MYELOPCT 1.0 09/07/2019    BASOPCT 0.4 10/02/2019    MONOSABS 0.10 10/02/2019    LYMPHSABS 0.45 10/02/2019    EOSABS 0.00 10/02/2019    BASOSABS 0.00 10/02/2019     CMP:    Lab Results   Component Value Date     04/14/2020    K 5.0 04/14/2020     04/14/2020    CO2 28 04/14/2020    BUN 13 04/14/2020    CREATININE 0.9 04/14/2020    GFRAA >60 04/14/2020    LABGLOM >60 04/14/2020    GLUCOSE 113 04/14/2020    GLUCOSE 237 04/30/2012    PROT 6.8 04/14/2020    LABALBU 3.0 04/14/2020    LABALBU 4.0 04/30/2012    CALCIUM 8.5 04/14/2020    BILITOT 1.0 04/14/2020    ALKPHOS 242 04/14/2020     04/14/2020    ALT 32 04/14/2020     PT/INR:    Lab Results   Component Value Date    PROTIME 12.6 10/21/2019    INR 1.1 10/21/2019     ABG:  No results found for: PH, PCO2, PO2, HCO3, BE, THGB, TCO2, O2SAT       Cta Chest W Contrast    Result Date: 4/14/2020  EXAMINATION: CTA OF THE CHEST 4/14/2020 10:19 am TECHNIQUE: CTA of the chest was performed after the administration of intravenous contrast.  Multiplanar reformatted images are provided for review. MIP images are provided for review.  Dose modulation, iterative

## 2020-04-15 NOTE — CONSULTS
on file   Lifestyle    Physical activity     Days per week: Not on file     Minutes per session: Not on file    Stress: Not on file   Relationships    Social connections     Talks on phone: Not on file     Gets together: Not on file     Attends [employer] service: Not on file     Active member of club or organization: Not on file     Attends meetings of clubs or organizations: Not on file     Relationship status: Not on file    Intimate partner violence     Fear of current or ex partner: Not on file     Emotionally abused: Not on file     Physically abused: Not on file     Forced sexual activity: Not on file   Other Topics Concern    Not on file   Social History Narrative    Not on file       History reviewed. No pertinent family history. Review of Systems:   Heart: as above   Lungs: as above   Eyes: denies changes in vision or discharge. Ears: denies changes in hearing or pain. Nose: denies epistaxis or masses   Throat: denies sore throat or trouble swallowing. Neuro: denies numbness, tingling, tremors. Skin: denies rashes or itching. : denies hematuria, dysuria   GI: denies vomiting, diarrhea   Psych: denies mood changed, anxiety, depression. all others negative. Physical Exam   /60   Pulse 73   Temp 97.5 °F (36.4 °C) (Axillary)   Resp 17   Ht 5' 5\" (1.651 m)   Wt 244 lb 4 oz (110.8 kg)   SpO2 98%   BMI 40.65 kg/m²   Constitutional: Oriented to person, place, and time. Well-developed and well-nourished. No distress. Head: Normocephalic and atraumatic. Eyes: EOM are normal. Pupils are equal, round, and reactive to light. Neck: Normal range of motion. Neck supple. No hepatojugular reflux and no JVD present. Carotid bruit is not present. No tracheal deviation present. No thyromegaly present. Cardiovascular: Normal rate, regular rhythm, normal heart sounds and intact distal pulses. Exam reveals no gallop and no friction rub. No murmur heard.   Pulmonary/Chest: Effort

## 2020-04-15 NOTE — PROGRESS NOTES
CK 2449 admission, CK trending down to 1007 day, continue IV fluid hydration with reduced rate  4. Left ankle pain status post mechanical fall, x-ray showed questionable nonplaced fractures, obtain CT left ankle. 5. Pancytopenia, likely secondary to [facility] liver cirrhosis. Follows Dr Jolene Crawford in outpatient setting as hematologist and in past HIV testing negative, peripheral blood flow cytometry is negative for B/T-cell lymphoma, LILIANA negative, Thony negative. Patient apparently has component of iron deficiency related anemia. Will continue to monitor CBC daily  6. History of HERNANDEZ liver cirrhosis with hx of varices, continue home dose nadolol. Continue home dose protonix twice daily. 7. Noninsulin-dependent type 2 diabetes, last A1c 5.5 (4/2020), hold metformin for now, cover with low-dose sliding scale, hypoglycemic protocol, goal of blood sugar 140-180.  8. Hx of DVT with IVC filter, repeat ultrasound of right leg negative for DVT, CTA showed no pulmonary embolism. Lovenox for DVT prophylaxis. 9. Hypothyroidism, continue home dose Synthroid. 10. Follow-up preop clearance by cardiology and GI. NOTE: This report was transcribed using voice recognition software.  Every effort was made to ensure accuracy; however, inadvertent computerized transcription errors may be present.     Electronically signed by Thomas Carr MD on 4/15/2020 at 2:33 PM

## 2020-04-15 NOTE — PROGRESS NOTES
left ankle xray  Sitting EOB:  good    Dynamic Standing: fair plus     Patient is Alert & Oriented x person, place, time and situation and follows directions    Sensation:  Patient denies numbness and tingling to extremities    Edema:  yes bilateral lower extremities and bilateral upper extremities    Endurance: poor      Vitals:  Heart Rate at rest 70 Heart Rate during session 110     Patient education  Patient educated on role of Physical Therapy, risks of immobility and plan of care      Patient response to education:   Pt verbalized understanding Pt demonstrated skill Pt requires further education in this area   Yes Partial Yes      ASSESSMENT: Patient exhibits decreased strength, balance, coordination impairing functional mobility. Therapist educated and facilitated patient on techniques to increase safety and independence with bed mobility, balance, functional transfers, and functional mobility. Treatment:  Patient practiced and was instructed in the following treatment: Sat edge of bed 20 minutes with Supervision  to increase dynamic sitting balance and activity tolerance. Therapeutic Exercises:  ankle pumps and long arc quad shoulder flexion, elbow flexion x 5-10 reps. At end of session, patient in bed with  call light and phone within reach,   all lines and tubes intact, nursing notified. Patient would benefit from continued skilled Physical Therapy to improve functional independence and quality of life. Patient's/ family goals   get stronger and home      Patient and or family understand(s) diagnosis, prognosis, and plan of care. PLAN:    Physical Therapy care will be provided in accordance with the objectives noted above. Exercises and functional mobility practice will be used as well as appropriate assistive devices or modalities to obtain goals. Patient and family education will also be administered as needed.     Frequency of treatments: Patient will be seen    daily for therapeutic exercise, functional retraining, endurance activities, balance exercises, family and patient education. Time in  1147   Time out  1222    Total Treatment Time  25 minutes    Evaluation time includes thorough review of current medical information, gathering information on past medical history/social history and prior level of function, completion of standardized testing/informal observation of tasks, assessment of data, and development of Plan of care and goals. CPT codes:   Moderate Complexity PT evaluation (24434)  Therapeutic activities (78020)   15 minutes  1 unit(s)  Therapeutic exercises (33514)   10 minutes  1 unit(s)    Brain Jones, PT

## 2020-04-15 NOTE — CONSULTS
breath, coughing, sputum production, hemoptysis, or wheezing. Gastrointestinal: Denies nausea, vomiting, diarrhea, or constipation. Denies any abdominal pain, black or bloody stools. Genitourinary: Denies any urinary urgency, frequency, hematuria. Voiding without difficulty. Endocrine: Denies sensitivity to heat or cold. Denies changes in hair, skin or nails. Denies excessive thirst, hunger or going to the bathroom more frequently than usual.  Extremities: Denies swelling or calf pain. Musculoskeletal: Denies muscle or joint pain, stiffness, arthritis, gout, or instability. Dermatology / Skin: Denies any rashes, ulcers, or excoriations. Denies bruising. Neurology: Denies any bowel or bladder incontinence, headache or focal neurological deficits. No weakness or paresthesia. Denies numbness or tingling in the hands or feet. Psychiatric: Denies nervousness/anxiety, depression or memory loss. Past Medical History:  Past Medical History:   Diagnosis Date    Blood transfusion     Gout     Hx of blood clots     right knee    Hyperlipidemia     Hypertension     Hypothyroidism     Morbid obesity [facility] [address]     Non-insulin dependent type 2 diabetes mellitus ([facility] [address]        Past Surgical History:  Past Surgical History:   Procedure Laterality Date    ENDOSCOPY, COLON, DIAGNOSTIC      HERNIA REPAIR      umbillical    KNEE ARTHROSCOPY  11/8/2012    right knee arthroscopy    OTHER SURGICAL HISTORY N/A 4-13-15    push enteroscopy    OTHER SURGICAL HISTORY      edd filter      2 months    TONSILLECTOMY         Home Medications:  Prior to Admission medications    Medication Sig Start Date End Date Taking?  Authorizing Provider   colestipol (COLESTID) 1 g tablet Take 1 g by mouth 2 times daily   Yes Historical Provider, MD   levothyroxine (SYNTHROID) 50 MCG tablet Take 50 mcg by mouth every morning (before breakfast)   Yes Historical Provider, MD   metFORMIN (GLUCOPHAGE) 500 MG tablet Take 500 mg by mouth place and time. Cranial nerves II-XII are grossly intact. Motor is 5 out of 5 bilaterally. SKIN: Normal skin color, texture, and turgor. There is no redness, warmth, or swelling. No bruising or bleeding, no mottling. PSYCH: Affect, behavior and insight are all within normal limits. DATA:  Results for orders placed or performed during the hospital encounter of 04/14/20   Culture, Urine   Result Value Ref Range    Urine Culture, Routine Growth not present, incubation continues    Legionella antigen, urine   Result Value Ref Range    L. pneumophila Serogp 1 Ur Ag       Presumptive Negative -suggesting no recent or current infections  with Legionella pneumophila serogroup 1. Infection to Legionella cannot be ruled out since other serogroups  and species may cause infection, antigen may not be present in  early infection, or level of antigen may be below the  detection limit. Normal Range: Presumptive Negative     Strep Pneumoniae Antigen   Result Value Ref Range    STREP PNEUMONIAE ANTIGEN, URINE       Presumptive negative suggests no current or recent  pneumococcal infection.  Infection due to Strep pneumoniae  cannot be ruled out since the antigen present in the sample  may be below the detection limit of the test.  Normal Range:Presumptive Negative     Respiratory Panel, Molecular   Result Value Ref Range    Adenovirus by PCR Not Detected Not Detected    Bordetella parapertussis by PCR Not Detected Not Detected    Bordetella pertussis by PCR Not Detected Not Detected    Chlamydophilia pneumoniae by PCR Not Detected Not Detected    Coronavirus 229E by PCR Not Detected Not Detected    Coronavirus HKU1 by PCR Not Detected Not Detected    Coronavirus NL63 by PCR Not Detected Not Detected    Coronavirus OC43 by PCR Not Detected Not Detected    Human Metapneumovirus by PCR Not Detected Not Detected    Human Rhinovirus/Enterovirus by PCR Not Detected Not Detected    Influenza A by PCR Not Detected Not Detected - Pt also only taking Lasix 20mg at home room for uptirating and adding Aldactone to pts regimen   - Place pt on 2gm NA diet   - VATS procedure is not contraindicated from a GI POV and to proceed with as long as chest tube is not placed   -Chest 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 Reviewed above with Dr.Dodig Shanae Heaton D.O.   GI fellow  4/15/2020 at 6:36 PM    Patient is seen and examined. Discussed plan of care with the resident and agreed with assessment and plan outlined above.   Gavni Inman MD  Gastroenterology

## 2020-04-16 NOTE — CARE COORDINATION
Patient care to be transitioned over to Dr. Lakisha Aparicio service as he covers for Dr. Eloise Hall. Case discussed with Dr Les Brown and he is agreeable to assuming care from tomorrow 4/17.

## 2020-04-16 NOTE — PROGRESS NOTES
ankle xray Sit to stand: Moderate assist of  2 using wheeled walker Sit to stand: Moderate assist of 1  If xray left ankle negative   Ambulation    not assessed   2 feet using wheeled walker with Moderate assist of  2. Verbal cues were given for safety, upright posture, increased TORO, and walker management. 25 feet using  wheeled walker with Minimal assist of 1  without knee buckling   ROM Within functional limits        Strength BUE: 3/5 shoulders and triceps; 3+ biceps  RLE:  3/5  LLE:  3/5   Increase strength in affected mm groups by 1/3 grade   Balance Sitting EOB:  fair    Dynamic Standing:  not assessed d/t pending left ankle xray Sitting EOB:  fair   Dynamic Standing:  fair holding onto wheeled walker Sitting EOB:  good    Dynamic Standing: fair plus     Patient is Alert & Oriented x person, place, time and situation and follows directions    Patient education  Patient was educated and facilitated on techniques to increase safety and independence with bed mobility, balance, functional transfers, and functional mobility. Patient was explained the the benefits of mobility and risks of immobility. Patient response to education:   Pt verbalized understanding Pt demonstrated skill Pt requires further education in this area    Yes Partial Yes      Treatment: Patient practiced and was instructed in the following treatment:      Patient required moderate assistance to roll x 1 rep(s) for hygiene. Patient transferred to side of bed and sat up x 25 minutes to increase dynamic sitting balance and activity tolerance. Patient performed below exercises and stood x 1 rep. Patient was returned to bed at end of treatment. Patient required moderate assistance to roll x 2 rep(s) for linen adjustment. Patient required maximal assistance of 2 to scoot up in bed, BLE were elevated on a pillow, and head of bed was elevated. Therapeutic Exercises: ankle pumps and long arc quad x 15-20 reps.  Verbal cues were given for correct

## 2020-04-16 NOTE — PROGRESS NOTES
[address] [place], [place] [address]ist   Progress Note    Admitting Date and Time: 4/14/2020  7:32 AM  Admit Dx: Rhabdomyolysis [M62.82]    Subjective:    Patient seen and examined at bedside this AM.  She still feels frustrated that she has to be in the hospital.  She states that she had a gallstone which was identified about 10 years ago and she do not want to do anything about it. Denies abdominal pain, denies nausea vomiting, last bowel movement 4 days ago but passing gas. Denies any bleeding. She is agreeable to for VATS. ROS: denies fever, chills, cp, sob, n/v, HA unless stated above.      furosemide  20 mg Intravenous Daily    enoxaparin  40 mg Subcutaneous Daily    sertraline  100 mg Oral Daily    allopurinol  100 mg Oral Daily    nadolol  10 mg Oral Daily    pantoprazole  40 mg Oral BID    gabapentin  100 mg Oral TID    traZODone  100 mg Oral Nightly    cholestyramine light  4 g Oral Daily    levothyroxine  50 mcg Oral QAM AC    cefTRIAXone (ROCEPHIN) IV  1 g Intravenous Q24H    zinc sulfate  50 mg Oral Daily    azithromycin  250 mg Oral Daily    insulin lispro  0-6 Units Subcutaneous TID WC    insulin lispro  0-3 Units Subcutaneous Nightly     acetaminophen, 650 mg, Q6H PRN    Or  acetaminophen, 650 mg, Q6H PRN  glucose, 15 g, PRN  dextrose, 12.5 g, PRN  glucagon (rDNA), 1 mg, PRN  dextrose, 100 mL/hr, PRN         Objective:    /62   Pulse 72   Temp 97.6 °F (36.4 °C) (Oral)   Resp 16   Ht 5' 5\" (1.651 m)   Wt 244 lb 4 oz (110.8 kg)   SpO2 95%   BMI 40.65 kg/m²   General Appearance: alert and oriented to person, place and time and in no acute distress  Skin: warm and dry  Head: normocephalic and atraumatic  Eyes: pupils equal, round, and reactive to light, extraocular eye movements intact, conjunctivae normal  Neck: neck supple and non tender without mass   Pulmonary/Chest: clear to auscultation bilaterally- no wheezes, rales or rhonchi, normal air movement, no respiratory distress  Cardiovascular: normal rate, normal S1 and S2   Abdomen: Obese, soft, non-tender, non-distended, normal bowel sounds, no masses or organomegaly  Extremities: no cyanosis, no clubbing and 1+ edema bl LEs  Neurologic: no cranial nerve deficit and speech normal      Recent Labs     04/14/20  0826 04/15/20  0857    140   K 5.0 3.6    105   CO2 28 25   BUN 13 13   CREATININE 0.9 1.0   GLUCOSE 113* 116*   CALCIUM 8.5* 8.2*       Recent Labs     04/14/20  0826 04/15/20  0857   WBC 3.1* 3.8*   RBC 3.42* 3.33*   HGB 7.6* 7.5*   HCT 26.8* 26.6*   MCV 78.4* 79.9*   MCH 22.2* 22.5*   MCHC 28.4* 28.2*   RDW 17.9* 17.8*   PLT 88* 98*   MPV 11.1 11.3       Radiology:   US ABDOMEN LIMITED   Final Result   Ascites within the right upper quadrant and right-sided pleural effusion. Cholelithiasis and gallbladder wall thickening measuring 5 mm. Clinical   correlation is strongly recommended. This can be seen in setting of the   hypoproteinemic state or volume overload amongst other etiologies. Cholecystitis may be considered differential if there are symptoms of there   is right upper quadrant pain or discomfort. Cirrhotic appearance of the liver. XR ANKLE LEFT (MIN 3 VIEWS)   Final Result   Oblique lucency seen through the tip of the lateral malleolus, as well as a   questionable linear lucency through the posterior aspect of the talus on the   lateral view which could both represent possibly nondisplaced fractures. Given these findings and recent fall, consider a CT scan of the ankle for   better evaluation of these 2 findings. The ankle mortise appears intact. No other fracture is identified. CTA CHEST W CONTRAST   Final Result   1. No evidence of pulmonary embolism. 2. New large patchy ground-glass opacity in the left upper lobe as well as   smaller, more peripheral ground-glass opacities in the lingula.   The   differential favors an infectious process including viral and LILIANA negative, Thony negative. Patient apparently has component of iron deficiency related anemia. Will continue to monitor CBC daily  6. Cholelithiasis and gallbladder wall thickening measuring 5 mm. She said that she has hx of cholelithiasis and does not want anything to be done. Mild RUQ tenderness on plapation but no chavez sign. GI following. 7. HERNANDEZ liver cirrhosis with hx of varices and ascites, continue home dose nadolol. Continue home dose protonix twice daily. Increased lasix to 40 mg, GI recommended to start aldactone. Initiated Aldactone 100mg. Monitor BP closely  8. Noninsulin-dependent type 2 diabetes, last A1c 5.5 (4/2020), hold metformin for now, cover with low-dose sliding scale, hypoglycemic protocol, goal of blood sugar 140-180.  9. Hx of DVT with IVC filter, repeat ultrasound of right leg negative for DVT, CTA showed no pulmonary embolism. Lovenox for DVT prophylaxis. 10. Hypothyroidism, continue home dose Synthroid. 11. Follow-up preop clearance by cardiology and GI. NOTE: This report was transcribed using voice recognition software.  Every effort was made to ensure accuracy; however, inadvertent computerized transcription errors may be present.     Electronically signed by Esthela Garcia MD on 4/16/2020 at 8:15 AM

## 2020-04-16 NOTE — PLAN OF CARE
Problem: Falls - Risk of:  Goal: Will remain free from falls  Description: Will remain free from falls  Outcome: Met This Shift     Problem: Discharge Planning:  Goal: Discharged to appropriate level of care  Description: Discharged to appropriate level of care  Outcome: Met This Shift     Problem: Airway Clearance - Ineffective:  Goal: Clear lung sounds  Description: Clear lung sounds  Outcome: Met This Shift

## 2020-04-16 NOTE — PROGRESS NOTES
No evidence of pulmonary embolism. 2. New large patchy ground-glass opacity in the left upper lobe as well as smaller, more peripheral ground-glass opacities in the lingula. The differential favors an infectious process including viral and atypical pneumonia. 3. Moderate to large right pleural effusion. 4. Cirrhotic appearance of the liver with splenomegaly and a small amount of ascites. Us Abdomen Limited    Result Date: 4/15/2020  EXAMINATION: RIGHT UPPER QUADRANT ULTRASOUND 4/15/2020 4:44 pm COMPARISON: 08/13/2018 HISTORY: ORDERING SYSTEM PROVIDED HISTORY: RUQ:  rule out hepatic mass and abdominal for ascites  PROVIDED HISTORY: Reason for exam:->RUQ:  rule out hepatic mass and abdominal for ascites survery Reason for exam:->Abdominal US for ascites survery FINDINGS: LIVER:  There is diffusely increased abnormal echogenicity throughout the liver suggestive of hepatocellular disease. As result there is decreased through transmission of sound which decreases sensitivity for underlying parenchymal mass. BILIARY SYSTEM:  Multiple stones identified within the gallbladder. Sonographic Theora Burton sign is negative. Common bile duct measures 5 mm. Gallbladder wall thickening measuring 5 mm Common bile duct is within normal limits measuring 5 mm RIGHT KIDNEY: The right kidney is grossly unremarkable without evidence of hydronephrosis. PANCREAS:  Visualized portions of the pancreas are unremarkable. OTHER: There is ascites within the right upper quadrant. Right-sided pleural effusion noted. Ascites within the right upper quadrant and right-sided pleural effusion. Cholelithiasis and gallbladder wall thickening measuring 5 mm. Clinical correlation is strongly recommended. This can be seen in setting of the hypoproteinemic state or volume overload amongst other etiologies. Cholecystitis may be considered differential if there are symptoms of there is right upper quadrant pain or discomfort.

## 2020-04-16 NOTE — PROGRESS NOTES
[address] [place] [address] [facility]  Progress Note    NAME:Fátima Jurado Degree  1945  DATE:20    FACE TO FACE ENCOUNTER FOR : pneumonia    SUBJECTIVE:  Chief Complaint   Patient presents with    Fatigue     to er via ems from home for complaint of being so weak she can not get out of her chair. this has been ongoing x 1 month. when she gets up, she is falling frequently and hurt her right thigh and left ankle.  Shortness of Breath     x 1 month. also has \"chest discomfort\". constipation  No FEVERS, CHILLS, nausea, vomiting, diarrhea. Patient is tolerating medications. No reported adverse drug reactions. Review of systems:  As stated above in the chief complaint, otherwise negative. Medications:  Scheduled Meds:   lactulose  10 g Oral BID    spironolactone  100 mg Oral Daily    [START ON 2020] furosemide  40 mg Oral Daily    furosemide  20 mg Oral Once    enoxaparin  40 mg Subcutaneous Daily    sertraline  100 mg Oral Daily    allopurinol  100 mg Oral Daily    nadolol  10 mg Oral Daily    pantoprazole  40 mg Oral BID    gabapentin  100 mg Oral TID    traZODone  100 mg Oral Nightly    cholestyramine light  4 g Oral Daily    levothyroxine  50 mcg Oral QAM AC    cefTRIAXone (ROCEPHIN) IV  1 g Intravenous Q24H    zinc sulfate  50 mg Oral Daily    azithromycin  250 mg Oral Daily    insulin lispro  0-6 Units Subcutaneous TID WC    insulin lispro  0-3 Units Subcutaneous Nightly     Continuous Infusions:   dextrose       PRN Meds:sennosides-docusate sodium, acetaminophen **OR** acetaminophen, glucose, dextrose, glucagon (rDNA), dextrose    OBJECTIVE:  BP (!) 148/65   Pulse 71   Temp 97.6 °F (36.4 °C)   Resp 17   Ht 5' 5\" (1.651 m)   Wt 244 lb 4 oz (110.8 kg)   SpO2 94%   BMI 40.65 kg/m²   Temp  Av.6 °F (36.4 °C)  Min: 97.6 °F (36.4 °C)  Max: 97.7 °F (36.5 °C)  Constitutional:  The patient is awake, alert, and oriented. Skin:    Warm and dry.  No rashes were noted. HEENT:   Round and reactive pupils. AT/NC  Neck:    Supple to movements. Chest:   No use of accessory muscles to breathe. Symmetrical expansion. On o2 dec bs ant   Cardiovascular:  S1 and S2 are rhythmic and regular. No murmurs appreciated. Abdomen:   Positive bowel sounds to auscultation. Benign to palpation. pannus  Extremities:   No clubbing, no cyanosis,  edema. CNS    AAxO   Lines: piv    Radiology:  Laboratory and Tests Review:  Lab Results   Component Value Date    WBC 3.8 (L) 04/15/2020    WBC 3.1 (L) 04/14/2020    WBC 2.5 (L) 10/02/2019    HGB 7.5 (L) 04/15/2020    HCT 26.6 (L) 04/15/2020    MCV 79.9 (L) 04/15/2020    PLT 98 (L) 04/15/2020     No results found for: CRPHS  Lab Results   Component Value Date    ALT 30 04/15/2020     (H) 04/15/2020    ALKPHOS 224 (H) 04/15/2020    BILITOT 0.7 04/15/2020     Lab Results   Component Value Date     04/15/2020    K 3.6 04/15/2020     04/15/2020    CO2 25 04/15/2020    BUN 13 04/15/2020    CREATININE 1.0 04/15/2020    CREATININE 0.9 04/14/2020    CREATININE 1.2 10/02/2019    GFRAA >60 04/15/2020    LABGLOM 54 04/15/2020    GLUCOSE 116 04/15/2020    GLUCOSE 237 04/30/2012    PROT 6.1 04/15/2020    LABALBU 2.5 04/15/2020    LABALBU 4.0 04/30/2012    CALCIUM 8.2 04/15/2020    BILITOT 0.7 04/15/2020    ALKPHOS 224 04/15/2020     04/15/2020    ALT 30 04/15/2020     Lab Results   Component Value Date    CRP 1.4 (H) 04/14/2020     Lab Results   Component Value Date    SEDRATE 40 (H) 05/29/2013       Microbiology:   No results found for: BLOODCULT2, ORG  No results found for: WNDABS  No results found for: RESPSMEAR      Component Value Date/Time    AFBCX No growth after 6 weeks of incubation.  01/20/2020 1330       No results found for: CXCATHTIP  Body Fluid Culture, Sterile   Date Value Ref Range Status   01/20/2020 Growth not present  Final        Recent Labs     04/14/20  1145   LABURIN <10,000 CFU/mL  Alpha hemolytic

## 2020-04-16 NOTE — PLAN OF CARE
Problem: Falls - Risk of:  Goal: Will remain free from falls  Description: Will remain free from falls  4/16/2020 0942 by Angela Holguin RN  Outcome: Met This Shift     Problem: Falls - Risk of:  Goal: Absence of physical injury  Description: Absence of physical injury  Outcome: Met This Shift     Problem: Airway Clearance - Ineffective:  Goal: Clear lung sounds  Description: Clear lung sounds  4/16/2020 0942 by Angela Holguin RN  Outcome: Met This Shift     Problem: Airway Clearance - Ineffective:  Goal: Ability to maintain a clear airway will improve  Description: Ability to maintain a clear airway will improve  Outcome: Met This Shift     Problem: Fluid Volume - Deficit:  Goal: Achieves intake and output within specified parameters  Description: Achieves intake and output within specified parameters  Outcome: Met This Shift     Problem: Gas Exchange - Impaired:  Goal: Levels of oxygenation will improve  Description: Levels of oxygenation will improve  Outcome: Met This Shift     Problem: Pain:  Goal: Pain level will decrease  Description: Pain level will decrease  Outcome: Met This Shift     Problem: Pain:  Goal: Control of acute pain  Description: Control of acute pain  Outcome: Met This Shift     Problem: Pain:  Goal: Control of chronic pain  Description: Control of chronic pain  Outcome: Met This Shift

## 2020-04-16 NOTE — PROGRESS NOTES
placement throughout evaluation to improve safety, static standing balance with wheeled walker to improve balance, functional mobility with wheeled walker to improve balance, assistance for walker negotiation, verbal cues for walker sequence and safety. Pt returned to EOB for grooming. Pt assisted back to bed, maximal assist x 2 to scoot up in bed. OT services provided to include instruction/training on safety and adapted techniques for completion of transfers and ADL's. Evaluation Complexity:  · Medium Complexity  · History: Expanded review of medical records and additional review of physical, cognitive, or psychosocial history related to current functional performance. · Exam: 3-5 performance deficits  · Assistance/Modification: Min/mod assistance or modifications required to perform tasks. May have comorbidities that affect occupational performance. Assessment of current deficits   Functional mobility [x]        ADLs [x]        Strength [x]      Cognition []  Functional transfers  [x]       IADLs [x]        Safety Awareness []      Endurance [x]  Fine Motor Coordination []       Balance [x]       Vision/perception []     Sensation []   Gross Motor Coordination []       ROM []         Delirium []                          Motor Control []    Plan of Care: OT 1-3x/week PRN during hospitalization  ADL retraining [x]   Equipment needs [x]   Neuromuscular re-education [] Energy Conservation Techniques [x]  Functional Transfer training [x] Patient and/or Family Education [x]  Functional Mobility training [x]  Environmental Modifications []  Cognitive re-training []   Compensatory techniques for ADLs [x]  Splinting Needs []   Positioning to improve overall function [x]   Therapeutic Activity [x]  Therapeutic Exercise  []  Visual/Perceptual: []    Delirium prevention/treatment  []  Other:  []    Rehab Potential: Good for established goals developed with patient and family.       Patient / Family Goal: walk again

## 2020-04-17 NOTE — PROGRESS NOTES
Sterile   Date Value Ref Range Status   01/20/2020 Growth not present  Final        No results for input(s): Diana Rosa in the last 72 hours. ASSESSMENT/PLAN:  Right pleural effusion  Pneumonia RAMONE  Cholelithiasis and gallbladder wall thickening LFT uip   Pancytopenia      acetaminophen (TYLENOL) suppository 650 mg, Q6H PRN  cefTRIAXone (ROCEPHIN) 1 g in sterile water 10 mL IV syringe, Q24H  zinc sulfate (ZINCATE) capsule 50 mg, Daily  azithromycin (ZITHROMAX) tablet 250 mg, Daily     S/p  Surgery   No change  Check cx  · Monitor labs    Imaging and labs were reviewed per medical records. The patient was educated about the diagnosis, prognosis, indications, risks and benefits of treatment. An opportunity to ask questions was given to the patient/FAMILY.       Electronically signed by Lisa Johnson MD on 4/17/2020 at 3:51 PM

## 2020-04-17 NOTE — PROGRESS NOTES
[facility] Cardiology Inpatient Progress Note    Patient is a 69 y.o. female of Priyanka Haile DO seen in hospital follow up. Chief complaint: Pre-op    HPI: Some SOB. No CP.      Patient Active Problem List   Diagnosis    Hypothyroidism    Hypertension    Type 2 diabetes mellitus with other specified complication ([facility] [address]    Gout    Upper GI bleed    Normocytic anemia    Obesity    Esophageal varices (HCC)    DVT (deep venous thrombosis) (HCC)    Calculus of gallbladder with acute on chronic cholecystitis without obstruction    Rhabdomyolysis    Red blood cell antibody positive    Iron deficiency anemia    Recurrent right pleural effusion    Acute respiratory failure with hypoxia (HCC)    Suspected COVID-19 virus infection    Pneumonia of left lung due to infectious organism    Pancytopenia ([facility] [address]    Hyperlipidemia       No Known Allergies    Current Facility-Administered Medications   Medication Dose Route Frequency Provider Last Rate Last Dose    promethazine (PHENERGAN) injection 25 mg  25 mg Intravenous PRN Tomasz Proctor MD        labetalol (NORMODYNE;TRANDATE) injection 5 mg  5 mg Intravenous Q10 Min PRN Tomasz Proctor MD        meperidine (DEMEROL) injection 12.5 mg  12.5 mg Intravenous Q5 Min PRN Tomasz Proctor MD        HYDROmorphone HCl PF (DILAUDID) injection 0.25 mg  0.25 mg Intravenous Q5 Min PRN Tomasz Proctor MD        HYDROmorphone HCl PF (DILAUDID) injection 0.5 mg  0.5 mg Intravenous Q5 Min PRN Tomasz Proctor MD        0.9 % sodium chloride bolus  20 mL Intravenous Once Charity Lennox, MD        furosemide (LASIX) injection 20 mg  20 mg Intravenous Once Charity Lennox, MD        sennosides-docusate sodium (SENOKOT-S) 8.6-50 MG tablet 2 tablet  2 tablet Oral Daily PRN Cele Rubalcava MD        lactulose (CHRONULAC) 10 GM/15ML solution 10 g  10 g Oral BID Yulia Arias DO   Stopped at 04/17/20 0741    spironolactone (ALDACTONE) tablet

## 2020-04-17 NOTE — OP NOTE
lung looks  satisfactory. The visceral and parietal pleura are satisfactory. There  is a lot of effusion in the right hemithorax. A second port is placed  in the fourth intercostal space anterior axillary line under direct  visualization. The pleural fluid was removed and there was  approximately 1800 mL of yellow colored fluid. Again, examination of  the hemithorax shows no significant abnormality. The lung does not  appear to be trapped. This is hepatic effusion as I suspect that there  is no evidence of lung mass or any suspicious lesions. The pleura  appears to be normal.  There is no thickening of the pleura. At this  point, we proceeded with the pleurodesis with talc under direct  visualization until the talc was spread throughout the parietal and  visceral pleura uniformly. A 28-Palestinian chest tube is placed in the mid  lateral incision of the sixth intercostal space and directed to the base  of the right hemithorax under direct visualization. Then, we secured  the chest tube using 0 silk suture, inflated the right lung back up  completely and  the lung appears to be completely up and then we removed  the port from the last port site, closed the incision with inverted 3-0  Vicryl suture and 4-0 Vicryl in a running subcuticular fashion. The  patient tolerated the procedure well and was hemodynamically stable. Left the OR in stable condition. There was no bleeding from the port  sites that I could see.         Krista Shaw MD    D: 04/17/2020 13:21:19       T: 04/17/2020 14:05:47     [email or web address]  Job#: [id number]     Doc#: [id number]    CC:

## 2020-04-17 NOTE — ANESTHESIA PROCEDURE NOTES
Central Venous Line:    A central venous line was placed using ultrasound guidance, in the pre-op for the following indication(s): central venous access. 4/17/2020 11:40 AM4/17/2020 11:55 AM    Sterility preparation included the following: hand hygiene performed prior to procedure, maximum sterile barriers used and sterile technique used to drape from head to toe. The patient was placed in supine position. The right internal jugular vein was prepped. A 7 Fr (size), Catheter length (cm): 30cm catheter, 15 cm at skin. triple lumen was placed. Intravenous verification was obtained by ultrasound and venous blood return. Post insertion care included: all ports aspirated, all ports flushed easily, guidewire removed intact, Biopatch applied, line sutured in place and dressing applied.   Anesthesia type: local  Staffing  Anesthesiologist: Rasheeda Hughes MD  Preanesthetic Checklist  Completed: patient identified, site marked, surgical consent, pre-op evaluation, timeout performed, IV checked, risks and benefits discussed, monitors and equipment checked, anesthesia consent given, oxygen available and patient being monitored

## 2020-04-17 NOTE — PROGRESS NOTES
this is not displaced. This can be seen on the AP and oblique projections. Bone demineralization is identified. Oblique lucency seen through the tip of the lateral malleolus, as well as a questionable linear lucency through the posterior aspect of the talus on the lateral view which could both represent possibly nondisplaced fractures. Given these findings and recent fall, consider a CT scan of the ankle for better evaluation of these 2 findings. The ankle mortise appears intact. No other fracture is identified. Cta Chest W Contrast    Result Date: 4/14/2020  EXAMINATION: CTA OF THE CHEST 4/14/2020 10:19 am TECHNIQUE: CTA of the chest was performed after the administration of intravenous contrast.  Multiplanar reformatted images are provided for review. MIP images are provided for review. Dose modulation, iterative reconstruction, and/or weight based adjustment of the mA/kV was utilized to reduce the radiation dose to as low as reasonably achievable. COMPARISON: 01/20/2020, 09/29/2019 HISTORY: ORDERING SYSTEM PROVIDED HISTORY: DVT; no anticoagulation; CP and SOB TECHNOLOGIST PROVIDED HISTORY: Reason for exam:->DVT; no anticoagulation; CP and SOB FINDINGS: Pulmonary Arteries: Pulmonary arteries are adequately opacified for evaluation. No evidence of intraluminal filling defect to suggest pulmonary embolism. Main pulmonary artery is normal in caliber. Mediastinum: Aortic and coronary atherosclerosis. The thyroid is within normal limits. No pericardial effusion. The esophagus is within normal limits. No mediastinal adenopathy. Lungs/pleura: Moderate to large right pleural effusion. Trace left pleural effusion. Compressive atelectasis at the lung bases, without definite consolidation. Focal patchy ground-glass opacity in the left upper lobe as well as smaller, more peripheral ground-glass opacities in the left upper lobe. The central airways are grossly patent. Upper Abdomen: Splenomegaly.   Small amount of ascites. The liver is mildly cirrhotic in appearance. Soft Tissues/Bones: No acute bone or soft tissue abnormality. 1. No evidence of pulmonary embolism. 2. New large patchy ground-glass opacity in the left upper lobe as well as smaller, more peripheral ground-glass opacities in the lingula. The differential favors an infectious process including viral and atypical pneumonia. 3. Moderate to large right pleural effusion. 4. Cirrhotic appearance of the liver with splenomegaly and a small amount of ascites. Us Abdomen Limited    Result Date: 4/15/2020  EXAMINATION: RIGHT UPPER QUADRANT ULTRASOUND 4/15/2020 4:44 pm COMPARISON: 08/13/2018 HISTORY: ORDERING SYSTEM PROVIDED HISTORY: RUQ:  rule out hepatic mass and abdominal for ascites  PROVIDED HISTORY: Reason for exam:->RUQ:  rule out hepatic mass and abdominal for ascites survery Reason for exam:->Abdominal US for ascites survery FINDINGS: LIVER:  There is diffusely increased abnormal echogenicity throughout the liver suggestive of hepatocellular disease. As result there is decreased through transmission of sound which decreases sensitivity for underlying parenchymal mass. BILIARY SYSTEM:  Multiple stones identified within the gallbladder. Sonographic Valla Landsman sign is negative. Common bile duct measures 5 mm. Gallbladder wall thickening measuring 5 mm Common bile duct is within normal limits measuring 5 mm RIGHT KIDNEY: The right kidney is grossly unremarkable without evidence of hydronephrosis. PANCREAS:  Visualized portions of the pancreas are unremarkable. OTHER: There is ascites within the right upper quadrant. Right-sided pleural effusion noted. Ascites within the right upper quadrant and right-sided pleural effusion. Cholelithiasis and gallbladder wall thickening measuring 5 mm. Clinical correlation is strongly recommended.   This can be seen in setting of the hypoproteinemic state or volume overload amongst other etiologies. Cholecystitis may be considered differential if there are symptoms of there is right upper quadrant pain or discomfort. Cirrhotic appearance of the liver. Us Dup Lower Extremity Right Aureliano    Result Date: 4/14/2020  EXAMINATION: DUPLEX VENOUS ULTRASOUND OF THE RIGHT LOWER EXTREMITY, 4/14/2020 10:37 am TECHNIQUE: Duplex ultrasound and Doppler images were obtained of the right lower extremity. COMPARISON: None. HISTORY: Reason for exam:->evaluate DVT Pain FINDINGS: The visualized veins of the right lower extremity are patent and free of echogenic thrombus. The veins are normally compressible and have normal phasic flow. No evidence of DVT in the right lower extremity. Objective:   Vitals: /65   Pulse 68   Temp 98 °F (36.7 °C) (Oral)   Resp 16   Ht 5' 5\" (1.651 m)   Wt 244 lb 4 oz (110.8 kg)   SpO2 96%   BMI 40.65 kg/m²  O2 Flow Rate (L/min): 1 L/min  Neck: no adenopathy and no jugular venous distention  Lungs: diminished breath sounds bilaterally  Heart: regular rate and rhythm, S1, S2 normal, no murmur, click, rub or gallop  Abdomen: Abdomen soft, non-tender. BS normal. No masses,  No organomegaly  Extremities: extremities normal, atraumatic, no cyanosis or edema      Assessment:   Acute respiratory failure  Recurrent right pleural effusion, status post thoracentesis in January and October  MELANIE? Mild pulmonary hypertension  History of HERNANDEZ  History of DVT with IVC filter placement     Plan:   1.  For possible VATS  2. GI, cardiology and thoracic surgery input appreciated     Ronen Dorman MD

## 2020-04-17 NOTE — ANESTHESIA PRE PROCEDURE
Department of Anesthesiology  Preprocedure Note       Name:  Deysi Gutierrez   Age:  69 y.o.  :  1945                                          MRN:  [id number]         Date:  2020      Surgeon: Brian Garcia):  Salvador Thakur MD    Procedure: FIBEROPTIC BRONCHOSCOPY RIGHT VIDEO ASSISTED THORACOSCOPY DECORTACATION PLEURIDESIS POSS THORACOTOMY (Right )    Medications prior to admission:   Prior to Admission medications    Medication Sig Start Date End Date Taking? Authorizing Provider   colestipol (COLESTID) 1 g tablet Take 1 g by mouth 2 times daily   Yes Historical Provider, MD   levothyroxine (SYNTHROID) 50 MCG tablet Take 50 mcg by mouth every morning (before breakfast)   Yes Historical Provider, MD   metFORMIN (GLUCOPHAGE) 500 MG tablet Take 500 mg by mouth daily (with breakfast)   Yes Historical Provider, MD   zinc sulfate (ORAZINC) 220 (50 Zn) MG capsule Take 1 capsule by mouth daily 10/16/19 10/15/20 Yes Edward Marcano MD   gabapentin (NEURONTIN) 100 MG capsule Take 100 mg by mouth 3 times daily. 3/28/19  Yes Historical Provider, MD   traZODone (DESYREL) 50 MG tablet Take 100 mg by mouth nightly  3/27/19  Yes Historical Provider, MD   furosemide (LASIX) 20 MG tablet Take 20 mg by mouth every morning    Yes Historical Provider, MD   pantoprazole (PROTONIX) 40 MG tablet Take 1 tablet by mouth 2 times daily 17  Yes Zaida Beaver,    rosuvastatin (CRESTOR) 10 MG tablet Take 10 mg by mouth daily   Yes Historical Provider, MD   nadolol (CORGARD) 20 MG tablet Take 0.5 tablets by mouth daily. 4/17/15  Yes Zaida Beaver DO   sertraline (ZOLOFT) 50 MG tablet Take 100 mg by mouth daily    Yes Historical Provider, MD   allopurinol (ZYLOPRIM) 100 MG tablet Take 100 mg by mouth daily.      Yes Historical Provider, MD   zinc sulfate (ORAZINC) 220 (50 Zn) MG capsule Take 4 capsules by mouth daily 4/15/20 5/15/20  MARIJA Santo - CNP   influenza virus trivalent vaccine (FLUZONE) injection Inject 0.5 mLs into the muscle once Given 1/2020    Historical Provider, MD       Current medications:    Current Facility-Administered Medications   Medication Dose Route Frequency Provider Last Rate Last Dose    sennosides-docusate sodium (SENOKOT-S) 8.6-50 MG tablet 2 tablet  2 tablet Oral Daily PRN Chip Magana MD        lactulose (CHRONULAC) 10 GM/15ML solution 10 g  10 g Oral BID Fremont Pore, DO   10 g at 04/16/20 2107    spironolactone (ALDACTONE) tablet 100 mg  100 mg Oral Daily Chip Magana MD   100 mg at 04/16/20 1355    furosemide (LASIX) tablet 40 mg  40 mg Oral Daily Chip Magana MD        ceFAZolin (ANCEF) 1 g in sterile water 10 mL IV syringe  1 g Intravenous Once Domonique Cid MD        enoxaparin (LOVENOX) injection 40 mg  40 mg Subcutaneous Daily Chip Magana MD        sertraline (ZOLOFT) tablet 100 mg  100 mg Oral Daily Elton Cervantes MD   100 mg at 04/16/20 0853    allopurinol (ZYLOPRIM) tablet 100 mg  100 mg Oral Daily Elton Cervantes MD   100 mg at 04/16/20 0853    nadolol (CORGARD) tablet 10 mg  10 mg Oral Daily Elton Smith MD   10 mg at 04/16/20 0853    pantoprazole (PROTONIX) tablet 40 mg  40 mg Oral BID Elton Cervantes MD   40 mg at 04/16/20 2107    gabapentin (NEURONTIN) capsule 100 mg  100 mg Oral TID Sabi Mota MD   100 mg at 04/16/20 2107    traZODone (DESYREL) tablet 100 mg  100 mg Oral Nightly Elton Cervantes MD   100 mg at 04/16/20 2107    cholestyramine light packet 4 g  4 g Oral Daily Elton Cervantes MD   4 g at 04/16/20 0854    levothyroxine (SYNTHROID) tablet 50 mcg  50 mcg Oral QAM AC Elton Cervantes MD   50 mcg at 04/16/20 0553    acetaminophen (TYLENOL) tablet 650 mg  650 mg Oral Q6H PRN Sabi Mota MD   650 mg at 04/15/20 1739    Or    acetaminophen (TYLENOL) suppository 650 mg  650 mg Rectal Q6H PRN Sabi Mota MD        cefTRIAXone (ROCEPHIN) 1 g in sterile water 10 mL IV syringe  1 g Intravenous Q24H Elton Cervantes MD   1 g at 04/16/20 2105    zinc sulfate (ZINCATE) capsule 50 mg 32.5 09/07/2019       HCG (If Applicable): No results found for: PREGTESTUR, PREGSERUM, HCG, HCGQUANT     ABGs: No results found for: PHART, PO2ART, WWK7ZEO, WJS7EHM, BEART, B7VAUCJE     Type & Screen (If Applicable):  No results found for: [facility]    Anesthesia Evaluation  Patient summary reviewed no history of anesthetic complications:   Airway: Mallampati: II  TM distance: >3 FB   Neck ROM: full  Mouth opening: > = 3 FB Dental: normal exam         Pulmonary:   (+) decreased breath sounds,                            ROS comment: RECURRENT PLEURAL  EFFUSION - Right  4/14 COVID-19 Negative Test   Cardiovascular:    (+) hypertension:, hyperlipidemia        Rhythm: regular  Rate: normal                    Neuro/Psych:               GI/Hepatic/Renal:   (+) morbid obesity         ROS comment: Esophageal varices . Endo/Other:    (+) DiabetesType II DM, , hypothyroidism: arthritis (Gout):., .                 Abdominal:   (+) obese,         Vascular:   + DVT, . Anesthesia Plan      general     ASA 4       Induction: intravenous. arterial line  MIPS: Postoperative opioids intended and Prophylactic antiemetics administered. Anesthetic plan and risks discussed with patient. Plan discussed with CRNA.                   Venu Velasco MD   4/17/2020

## 2020-04-17 NOTE — ANESTHESIA PROCEDURE NOTES
Arterial Line:    An arterial line was placed using surface landmarks, in the OR for the following indication(s): continuous blood pressure monitoring and blood sampling needed. A 20 gauge (size), 1 and 3/4 inch (length), Arrow (type) catheter was placed, Seldinger technique not used, into the left radial artery, secured by tape. Anesthesia type: Local  Local infiltration: Injection    Events:  patient tolerated procedure well with no complications.   Other anesthesia staff: MARIJA Corey - CRNA  Performed: Resident/CRNA   Preanesthetic Checklist  Completed: patient identified, site marked, surgical consent, pre-op evaluation, timeout performed, IV checked, risks and benefits discussed, monitors and equipment checked, anesthesia consent given, oxygen available and patient being monitored

## 2020-04-17 NOTE — CARE COORDINATION
Ss note:4/17/2020.10:29 AM Negative for COVID. Pt for VATS procedure today. Sw contacted pts son for assistance with d/c planning. Pt is active with [facility]. Pt was receptive to going to [facility] however this SNF is not in network with pt ins. Reviewed list with son, states to make referrals to 1. [facility] (they are full) 2. [facility]. [facility] requires [facility]. Sw will follow.  RAHDA Pena

## 2020-04-17 NOTE — PROGRESS NOTES
PROGRESS NOTE    By Shane Sanchez D.O GI Fellow    [facility]  Dr. Bedelia Romberg, MD, Dr. Tana Rothman MD, Dr Sidney Tinoco, Dr. Shruthi Ellis MD, Dr. Nellie Cook, DO Severiano Adams  69 y.o.  female    SUBJECTIVE: Pt resting comfortably this am. She denies any blood in her stool. Pt with a soft bowel movement this am    OBJECTIVE:    /65   Pulse 68   Temp 98 °F (36.7 °C) (Oral)   Resp 16   Ht 5' 5\" (1.651 m)   Wt 244 lb 4 oz (110.8 kg)   SpO2 96%   BMI 40.65 kg/m²     Gen: NAD, AAO x 3  HEENT:PEERL, no icterus  Heart: RRR, no M/R/G  Lungs: CTAB  Abd.: soft, NT, ND, BS +, large pannus   Extr.: no C/C/E, no bruising      Stool (measured) : 0 mL  Lab Results   Component Value Date    WBC 2.3 04/17/2020    WBC 3.8 04/15/2020    WBC 3.1 04/14/2020    HGB 7.1 04/17/2020    HGB 7.5 04/15/2020    HGB 7.6 04/14/2020    HCT 24.5 04/17/2020    MCV 79.3 04/17/2020    RDW 17.7 04/17/2020    PLT 80 04/17/2020    PLT 98 04/15/2020    PLT 88 04/14/2020     Lab Results   Component Value Date     04/17/2020    K 3.6 04/17/2020     04/17/2020    CO2 26 04/17/2020    BUN 13 04/17/2020    CREATININE 1.0 04/17/2020    CALCIUM 8.6 04/17/2020    PROT 6.3 04/17/2020    LABALBU 3.0 04/17/2020    LABALBU 4.0 04/30/2012    BILITOT 0.6 04/17/2020    BILITOT 0.7 04/15/2020    BILITOT 1.0 04/14/2020    ALKPHOS 222 04/17/2020    ALKPHOS 224 04/15/2020    ALKPHOS 242 04/14/2020    AST 78 04/17/2020     04/15/2020     04/14/2020    ALT 26 04/17/2020    ALT 30 04/15/2020    ALT 32 04/14/2020     Lab Results   Component Value Date    LIPASE 84 03/29/2015    LIPASE 129 03/28/2015    LIPASE 91 04/26/2011     Lab Results   Component Value Date    AMYLASE 58 03/28/2015         ASSESSMENT/PLAN:  1.  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

## 2020-04-17 NOTE — PROGRESS NOTES
level: Not on file   Occupational History    Not on file   Social Needs    Financial resource strain: Not on file    Food insecurity     Worry: Not on file     Inability: Not on file    Transportation needs     Medical: Not on file     Non-medical: Not on file   Tobacco Use    Smoking status: Never Smoker    Smokeless tobacco: Never Used   Substance and Sexual Activity    Alcohol use: No    Drug use: No    Sexual activity: Not on file   Lifestyle    Physical activity     Days per week: Not on file     Minutes per session: Not on file    Stress: Not on file   Relationships    Social connections     Talks on phone: Not on file     Gets together: Not on file     Attends Christian service: Not on file     Active member of club or organization: Not on file     Attends meetings of clubs or organizations: Not on file     Relationship status: Not on file    Intimate partner violence     Fear of current or ex partner: Not on file     Emotionally abused: Not on file     Physically abused: Not on file     Forced sexual activity: Not on file   Other Topics Concern    Not on file   Social History Narrative    Not on file       Family History:   History reviewed. No pertinent family history. REVIEW OF SYSTEMS:    Gen: Patient denies any lightheadedness or dizziness. No LOC or syncope. No fevers or chills. HEENT: No earache, sore throat or nasal congestion. Resp: Denies cough, hemoptysis or sputum production. Cardiac: Denies chest pain, +SOB, no diaphoresis or palpitations. GI: No nausea, vomiting, diarrhea or constipation. No melena or hematochezia. : No urinary complaints, dysuria, hematuria or frequency. MSK: + extremity weakness, paralysis or paresthesias.      PHYSICAL EXAM:    Vitals:  BP (!) 148/67   Pulse 68   Temp 97.9 °F (36.6 °C) (Oral)   Resp 20   Ht 5' 5\" (1.651 m)   Wt 244 lb 4 oz (110.8 kg)   SpO2 96%   BMI 40.65 kg/m²     General:  This is a 69 y.o. yo female who is

## 2020-04-18 NOTE — PROGRESS NOTES
Non-insulin dependent type 2 diabetes mellitus ([facility] [address]      Past Surgical History:    Past Surgical History:   Procedure Laterality Date    ENDOSCOPY, COLON, DIAGNOSTIC      HERNIA REPAIR      umbillical    KNEE ARTHROSCOPY  11/8/2012    right knee arthroscopy    OTHER SURGICAL HISTORY N/A 4-13-15    push enteroscopy    OTHER SURGICAL HISTORY      edd filter      2 months    TONSILLECTOMY         Medications Prior to Admission:    @  Prior to Admission medications    Medication Sig Start Date End Date Taking? Authorizing Provider   colestipol (COLESTID) 1 g tablet Take 1 g by mouth 2 times daily   Yes Historical Provider, MD   levothyroxine (SYNTHROID) 50 MCG tablet Take 50 mcg by mouth every morning (before breakfast)   Yes Historical Provider, MD   metFORMIN (GLUCOPHAGE) 500 MG tablet Take 500 mg by mouth daily (with breakfast)   Yes Historical Provider, MD   zinc sulfate (ORAZINC) 220 (50 Zn) MG capsule Take 1 capsule by mouth daily 10/16/19 10/15/20 Yes Joyce Garay MD   gabapentin (NEURONTIN) 100 MG capsule Take 100 mg by mouth 3 times daily. 3/28/19  Yes Historical Provider, MD   traZODone (DESYREL) 50 MG tablet Take 100 mg by mouth nightly  3/27/19  Yes Historical Provider, MD   furosemide (LASIX) 20 MG tablet Take 20 mg by mouth every morning    Yes Historical Provider, MD   pantoprazole (PROTONIX) 40 MG tablet Take 1 tablet by mouth 2 times daily 5/16/17  Yes Minerva Bird DO   rosuvastatin (CRESTOR) 10 MG tablet Take 10 mg by mouth daily   Yes Historical Provider, MD   nadolol (CORGARD) 20 MG tablet Take 0.5 tablets by mouth daily. 4/17/15  Yes Minerva Bird DO   sertraline (ZOLOFT) 50 MG tablet Take 100 mg by mouth daily    Yes Historical Provider, MD   allopurinol (ZYLOPRIM) 100 MG tablet Take 100 mg by mouth daily.      Yes Historical Provider, MD   zinc sulfate (ORAZINC) 220 (50 Zn) MG capsule Take 4 capsules by mouth daily 4/15/20 5/15/20  MARIJA Connelly - CNP   influenza virus 4.0 04/30/2012    CALCIUM 8.6 04/18/2020    BILITOT 0.7 04/18/2020    ALKPHOS 213 04/18/2020    AST 58 04/18/2020    ALT 24 04/18/2020     Magnesium:    Lab Results   Component Value Date    MG 1.8 04/18/2020     Phosphorus:    Lab Results   Component Value Date    PHOS 4.4 04/18/2020     PT/INR:    Lab Results   Component Value Date    PROTIME 13.4 04/17/2020    INR 1.2 04/17/2020     Troponin:    Lab Results   Component Value Date    TROPONINI 0.02 04/14/2020     U/A:    Lab Results   Component Value Date    COLORU Yellow 04/14/2020    PROTEINU Negative 04/14/2020    PHUR 7.0 04/14/2020    WBCUA 0-1 04/14/2020    WBCUA NONE 12/21/2011    RBCUA 0-1 04/14/2020    RBCUA NONE 12/21/2011    BACTERIA RARE 04/14/2020    CLARITYU Clear 04/14/2020    SPECGRAV 1.015 04/14/2020    LEUKOCYTESUR TRACE 04/14/2020    UROBILINOGEN 0.2 04/14/2020    BILIRUBINUR Negative 04/14/2020    BILIRUBINUR NEGATIVE 12/21/2011    BLOODU SMALL 04/14/2020    GLUCOSEU Negative 04/14/2020    GLUCOSEU 500 12/21/2011     ABG:  No results found for: PH, PCO2, PO2, HCO3, BE, THGB, TCO2, O2SAT  HgBA1c:    Lab Results   Component Value Date    LABA1C 5.5 04/14/2020     FLP:    Lab Results   Component Value Date    TRIG 89 09/07/2019    HDL 33 09/07/2019    LDLCALC 61 09/07/2019    LABVLDL 18 09/07/2019     TSH:    Lab Results   Component Value Date    TSH 8.430 04/14/2020     IRON:    Lab Results   Component Value Date    IRON 45 10/02/2019     LIPASE:    Lab Results   Component Value Date    LIPASE 84 03/29/2015       ASSESSMENT AND PLAN:      Patient Active Problem List    Diagnosis Date Noted    Esophageal varices ([facility] 04/13/2015     Priority: High    Upper GI bleed 03/28/2015     Priority: High    [facility] 04/13/2015     Priority: Medium    Normocytic anemia 03/28/2015     Priority: Medium    Obesity 03/28/2015     Priority: Medium    Hypothyroidism      Priority: Medium    Hypertension      Priority: Medium   

## 2020-04-18 NOTE — PROGRESS NOTES
No complaints  vss  Continues to drain large amounts of serosang fluid per chest tube  No airleak  Cor rrr  Chest clear  abdo benign  Continue current care

## 2020-04-18 NOTE — PROGRESS NOTES
aponeurosis. Midfoot degenerative changes are identified. There is an oblique lucency noted through the tip of the lateral malleolus, though this is not displaced. This can be seen on the AP and oblique projections. Bone demineralization is identified. Oblique lucency seen through the tip of the lateral malleolus, as well as a questionable linear lucency through the posterior aspect of the talus on the lateral view which could both represent possibly nondisplaced fractures. Given these findings and recent fall, consider a CT scan of the ankle for better evaluation of these 2 findings. The ankle mortise appears intact. No other fracture is identified. Cta Chest W Contrast    Result Date: 4/14/2020  EXAMINATION: CTA OF THE CHEST 4/14/2020 10:19 am TECHNIQUE: CTA of the chest was performed after the administration of intravenous contrast.  Multiplanar reformatted images are provided for review. MIP images are provided for review. Dose modulation, iterative reconstruction, and/or weight based adjustment of the mA/kV was utilized to reduce the radiation dose to as low as reasonably achievable. COMPARISON: 01/20/2020, 09/29/2019 HISTORY: ORDERING SYSTEM PROVIDED HISTORY: DVT; no anticoagulation; CP and SOB TECHNOLOGIST PROVIDED HISTORY: Reason for exam:->DVT; no anticoagulation; CP and SOB FINDINGS: Pulmonary Arteries: Pulmonary arteries are adequately opacified for evaluation. No evidence of intraluminal filling defect to suggest pulmonary embolism. Main pulmonary artery is normal in caliber. Mediastinum: Aortic and coronary atherosclerosis. The thyroid is within normal limits. No pericardial effusion. The esophagus is within normal limits. No mediastinal adenopathy. Lungs/pleura: Moderate to large right pleural effusion. Trace left pleural effusion. Compressive atelectasis at the lung bases, without definite consolidation.   Focal patchy ground-glass opacity in the left upper lobe as well as

## 2020-04-18 NOTE — PROGRESS NOTES
Wt 244 lb 4 oz (110.8 kg)   SpO2 99%   BMI 40.65 kg/m²   Temp  Av.3 °F (36.3 °C)  Min: 97.3 °F (36.3 °C)  Max: 97.3 °F (36.3 °C)  Constitutional:  The patient is awake, alert, and oriented. Skin:    Warm and dry. No rashes were noted. HEENT:    AT/NC    Chest:   No use of accessory muscles to breathe. .  On o2 dec bs ant   Cardiovascular:  S1 and S2 are rhythmic and regular. Abdomen:   Positive bowel sounds to auscultation. Benign to palpation. pannus  Extremities:   No clubbing, no cyanosis,  edema. CNS    AAxO   Lines: piv    Radiology:  Laboratory and Tests Review:  Lab Results   Component Value Date    WBC 5.2 2020    WBC 2.3 (L) 2020    WBC 3.8 (L) 04/15/2020    HGB 8.8 (L) 2020    HCT 30.4 (L) 2020    MCV 80.2 2020    PLT 80 (L) 2020     No results found for: CRP  Lab Results   Component Value Date    ALT 24 2020    AST 58 (H) 2020    ALKPHOS 213 (H) 2020    BILITOT 0.7 2020     Lab Results   Component Value Date     2020    K 4.0 2020     2020    CO2 29 2020    BUN 19 2020    CREATININE 1.0 2020    CREATININE 1.0 2020    CREATININE 1.0 04/15/2020    GFRAA >60 2020    LABGLOM 54 2020    GLUCOSE 125 2020    GLUCOSE 237 2012    PROT 6.3 2020    LABALBU 2.8 2020    LABALBU 4.0 2012    CALCIUM 8.6 2020    BILITOT 0.7 2020    ALKPHOS 213 2020    AST 58 2020    ALT 24 2020     Lab Results   Component Value Date    CRP 1.4 (H) 2020     Lab Results   Component Value Date    SEDRATE 40 (H) 2013       Microbiology:          Component Value Date/Time    AFBCX No growth after 6 weeks of incubation.  2020 1330       No results found for: CXCATHTIP  Body Fluid Culture, Sterile   Date Value Ref Range Status   2020 Growth not present, incubation continues  Preliminary        No results for input(s): Esteban Malin in the last 72 hours. ASSESSMENT/PLAN:  Right pleural effusion/loculated cx ngtd  Pneumonia RAMONE  Cholelithiasis and gallbladder wall thickening LFT uip   Pancytopenia      (ROCEPHIN) 1 g in sterile water 10 mL IV syringe, Q24H  zinc sulfate (ZINCATE) capsule 50 mg, Daily  azithromycin (ZITHROMAX) tablet 250 mg, Daily         S/p  Surgery   No change  Check cx  · Monitor labs    Imaging and labs were reviewed per medical records. The patient was educated about the diagnosis, prognosis, indications, risks and benefits of treatment. An opportunity to ask questions was given to the patient/FAMILY.       Electronically signed by Aj Latham MD on 4/18/2020 at 4:07 PM

## 2020-04-18 NOTE — PROGRESS NOTES
PROGRESS NOTE    By Juan Watson D.O GI Fellow    [facility]  Dr. Todd Garcia MD, Dr. Giovanni Ronquillo MD, Dr Lucia Mak, Dr. Favian Almaraz MD, Dr. Izabella Iqbal, DO [place] Constable  69 y.o.  female    SUBJECTIVE: Pt resting comfortably this am. She denies any blood in her stool. OBJECTIVE:    BP (!) 140/65   Pulse 79   Temp 96.3 °F (35.7 °C) (Axillary)   Resp 18   Ht 5' 5\" (1.651 m)   Wt 244 lb 4 oz (110.8 kg)   SpO2 94%   BMI 40.65 kg/m²     Gen: NAD, AAO x 3  HEENT:PEERL, no icterus  Heart: RRR, no M/R/G  Lungs: CTAB, right sided chest tube in place with fluid present   Abd.: soft, NT, ND, BS +, large pannus   Extr.: no C/C/E, no bruising      Stool (measured) : 0 mL  Lab Results   Component Value Date    WBC 5.2 04/18/2020    WBC 2.3 04/17/2020    WBC 3.8 04/15/2020    HGB 8.8 04/18/2020    HGB 7.1 04/17/2020    HGB 7.5 04/15/2020    HCT 30.4 04/18/2020    MCV 80.2 04/18/2020    RDW 17.5 04/18/2020    PLT 80 04/18/2020    PLT 80 04/17/2020    PLT 98 04/15/2020     Lab Results   Component Value Date     04/18/2020    K 4.0 04/18/2020     04/18/2020    CO2 29 04/18/2020    BUN 19 04/18/2020    CREATININE 1.0 04/18/2020    CALCIUM 8.6 04/18/2020    PROT 6.3 04/18/2020    LABALBU 2.8 04/18/2020    LABALBU 4.0 04/30/2012    BILITOT 0.7 04/18/2020    BILITOT 0.6 04/17/2020    BILITOT 0.7 04/15/2020    ALKPHOS 213 04/18/2020    ALKPHOS 222 04/17/2020    ALKPHOS 224 04/15/2020    AST 58 04/18/2020    AST 78 04/17/2020     04/15/2020    ALT 24 04/18/2020    ALT 26 04/17/2020    ALT 30 04/15/2020     Lab Results   Component Value Date    LIPASE 84 03/29/2015    LIPASE 129 03/28/2015    LIPASE 91 04/26/2011     Lab Results   Component Value Date    AMYLASE 58 03/28/2015         ASSESSMENT/PLAN:  1.  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  Recurrent Right Sided Pleural Effusion   - Pt with CTA chest in September 2019 and repeat on April 2020 patient with recurrent right-sided pleural effusion  - Concern for hepatic hydrothorax  - Recent Echo EF 71%   -Abdominal US with RUQ ascites not amenibalt to a paracentetics   - Continue with  Lasix 40mg and Aldactone 100mg   - Place pt on 2gm NA diet   - S/P VATs with chest tube placement, monitor chest tube drainage   - If chest tube cannot be removed pt will require  TIPS procedure which will require transfer to a tertiary center       Pt was discussed with Dr. Richard Hays DO  GI Fellow   4/18/2020  8:57 AM

## 2020-04-18 NOTE — CARE COORDINATION
Ss note:4/18/2020. 9:35 AM Spoke with pts DIL Patrick Blood [phone] who reports that pt does NOT want to go to a rehab center, initially pt has agreed to consider [place], however not in network. Family has spoken with pt again and she denies placement. Sw unable to reach pt via phone. Pt is active with [facility] for PT/OT/ nursing, aide and sw. Family is able to provide care for pt at home.  At this time plan is to return home with resumption of [address], [place]

## 2020-04-19 NOTE — PROGRESS NOTES
rate 18 with blood pressure 129/62. Patient currently on 4 L nasal cannula with O2 sat range %. Serum creatinine went up to 1.2 today with a BUN of 23. Blood sugars ranging 132-159. Hemoglobin is 8.6 and WBC is 6.0 and a platelet count 94. Ettie Hays Past Medical History:    Past Medical History:   Diagnosis Date    Blood transfusion     Gout     Hx of blood clots     right knee    Hyperlipidemia     Hypertension     Hypothyroidism     Morbid obesity ([facility] [address]     Non-insulin dependent type 2 diabetes mellitus ([facility] [address]      Past Surgical History:    Past Surgical History:   Procedure Laterality Date    ENDOSCOPY, COLON, DIAGNOSTIC      HERNIA REPAIR      umbillical    KNEE ARTHROSCOPY  11/8/2012    right knee arthroscopy    OTHER SURGICAL HISTORY N/A 4-13-15    push enteroscopy    OTHER SURGICAL HISTORY      edd filter      2 months    TONSILLECTOMY         Medications Prior to Admission:    @  Prior to Admission medications    Medication Sig Start Date End Date Taking? Authorizing Provider   colestipol (COLESTID) 1 g tablet Take 1 g by mouth 2 times daily   Yes Historical Provider, MD   levothyroxine (SYNTHROID) 50 MCG tablet Take 50 mcg by mouth every morning (before breakfast)   Yes Historical Provider, MD   metFORMIN (GLUCOPHAGE) 500 MG tablet Take 500 mg by mouth daily (with breakfast)   Yes Historical Provider, MD   zinc sulfate (ORAZINC) 220 (50 Zn) MG capsule Take 1 capsule by mouth daily 10/16/19 10/15/20 Yes Junior Khan MD   gabapentin (NEURONTIN) 100 MG capsule Take 100 mg by mouth 3 times daily.   3/28/19  Yes Historical Provider, MD   traZODone (DESYREL) 50 MG tablet Take 100 mg by mouth nightly  3/27/19  Yes Historical Provider, MD   furosemide (LASIX) 20 MG tablet Take 20 mg by mouth every morning    Yes Historical Provider, MD   pantoprazole (PROTONIX) 40 MG tablet Take 1 tablet by mouth 2 times daily 5/16/17  Yes Arely Zayas DO   rosuvastatin (CRESTOR) 10 MG tablet Take 10 mg by mouth daily   Yes Historical Provider, MD   nadolol (CORGARD) 20 MG tablet Take 0.5 tablets by mouth daily. 4/17/15  Yes Gray Galvan DO   sertraline (ZOLOFT) 50 MG tablet Take 100 mg by mouth daily    Yes Historical Provider, MD   allopurinol (ZYLOPRIM) 100 MG tablet Take 100 mg by mouth daily. Yes Historical Provider, MD   zinc sulfate (ORAZINC) 220 (50 Zn) MG capsule Take 4 capsules by mouth daily 4/15/20 5/15/20  Law Kirkland APRN - CNP   influenza virus trivalent vaccine (FLUZONE) injection Inject 0.5 mLs into the muscle once Given 1/2020    Historical Provider, MD       Allergies:  Patient has no known allergies.     Social History:   Social History     Socioeconomic History    Marital status:      Spouse name: Not on file    Number of children: Not on file    Years of education: Not on file    Highest education level: Not on file   Occupational History    Not on file   Social Needs    Financial resource strain: Not on file    Food insecurity     Worry: Not on file     Inability: Not on file   [employer] needs     Medical: Not on file     Non-medical: Not on file   Tobacco Use    Smoking status: Never Smoker    Smokeless tobacco: Never Used   Substance and Sexual Activity    Alcohol use: No    Drug use: No    Sexual activity: Not on file   Lifestyle    Physical activity     Days per week: Not on file     Minutes per session: Not on file    Stress: Not on file   Relationships    Social connections     Talks on phone: Not on file     Gets together: Not on file     Attends [employer] service: Not on file     Active member of club or organization: Not on file     Attends meetings of clubs or organizations: Not on file     Relationship status: Not on file    Intimate partner violence     Fear of current or ex partner: Not on file     Emotionally abused: Not on file     Physically abused: Not on file     Forced sexual activity: Not on file   Other Topics Concern    Not on

## 2020-04-19 NOTE — PROGRESS NOTES
[facility]  [facility]  Progress Note    NAME:Fátima George  1945  DATE:04/19/20    FACE TO FACE ENCOUNTER FOR : pneumonia    SUBJECTIVE:  Chief Complaint   Patient presents with    Fatigue     to er via ems from home for complaint of being so weak she can not get out of her chair. this has been ongoing x 1 month. when she gets up, she is falling frequently and hurt her right thigh and left ankle.  Shortness of Breath     x 1 month. also has \"chest discomfort\". in bed     Recurrent right pleural effusion. PROCEDURES PERFORMED:  Bronchoscopy, right video-assisted thoracic surgery. Drainage of pleural effusion, talc pleurodesis. Review of systems:  As stated above in the chief complaint, otherwise negative.     Medications:  Scheduled Meds:   [START ON 4/20/2020] furosemide  20 mg Oral Daily    [START ON 4/20/2020] spironolactone  50 mg Oral Daily    sodium chloride  20 mL Intravenous Once    docusate sodium  100 mg Oral BID    midazolam  1 mg Intravenous Once    lactulose  10 g Oral BID    enoxaparin  40 mg Subcutaneous Daily    sertraline  100 mg Oral Daily    allopurinol  100 mg Oral Daily    nadolol  10 mg Oral Daily    pantoprazole  40 mg Oral BID    gabapentin  100 mg Oral TID    traZODone  100 mg Oral Nightly    cholestyramine light  4 g Oral Daily    levothyroxine  50 mcg Oral QAM AC    cefTRIAXone (ROCEPHIN) IV  1 g Intravenous Q24H    zinc sulfate  50 mg Oral Daily    insulin lispro  0-6 Units Subcutaneous TID WC    insulin lispro  0-3 Units Subcutaneous Nightly     Continuous Infusions:   sodium chloride 50 mL/hr at 04/19/20 1317    dextrose       PRN Meds:HYDROcodone 5 mg - acetaminophen, sennosides-docusate sodium, acetaminophen **OR** acetaminophen, glucose, dextrose, glucagon (rDNA), dextrose    OBJECTIVE:  /62   Pulse 67   Temp 97.8 °F (36.6 °C) (Oral)   Resp 18   Ht 5' 5\" (1.651 m)   Wt 244 lb 4 oz (110.8 kg)   SpO2 100%   BMI 40.65 kg/m²   Temp  Av.5 °F (36.4 °C)  Min: 96.5 °F (35.8 °C)  Max: 97.8 °F (36.6 °C)  Constitutional:  The patient is awake, alert, and oriented. Skin:    Warm and dry. No rashes were noted. HEENT:    AT/NC    Chest:   No use of accessory muscles to breathe. .  On o2 dec bs ant   Cardiovascular:  S1 and S2 are rhythmic and regular. Abdomen:   Positive bowel sounds to auscultation. Benign to palpation. pannus  Extremities:   No clubbing, no cyanosis,  edema. CNS    AAxO   Lines: piv    Radiology:  Laboratory and Tests Review:  Lab Results   Component Value Date    WBC 6.0 2020    WBC 5.2 2020    WBC 2.3 (L) 2020    HGB 8.6 (L) 2020    HCT 29.8 (L) 2020    MCV 82.3 2020    PLT 94 (L) 2020     No results found for: CRPHS  Lab Results   Component Value Date    ALT 24 2020    AST 58 (H) 2020    ALKPHOS 213 (H) 2020    BILITOT 0.7 2020     Lab Results   Component Value Date     2020    K 3.8 2020     2020    CO2 27 2020    BUN 23 2020    CREATININE 1.2 2020    CREATININE 1.0 2020    CREATININE 1.0 2020    GFRAA 53 2020    LABGLOM 44 2020    GLUCOSE 132 2020    GLUCOSE 237 2012    PROT 6.3 2020    LABALBU 2.8 2020    LABALBU 4.0 2012    CALCIUM 8.7 2020    BILITOT 0.7 2020    ALKPHOS 213 2020    AST 58 2020    ALT 24 2020     Lab Results   Component Value Date    CRP 1.4 (H) 2020     Lab Results   Component Value Date    SEDRATE 40 (H) 2013       Microbiology:          Component Value Date/Time    AFBCX No growth after 6 weeks of incubation.  2020 1330       No results found for: CXCATHTIP  Body Fluid Culture, Sterile   Date Value Ref Range Status   2020   Preliminary    Growth not present, incubation continues  24 Hours- growth not present; incubation continues          No

## 2020-04-19 NOTE — PLAN OF CARE
Ineffective:  Goal: Clear lung sounds  Description: Clear lung sounds  4/19/2020 0408 by Waylon Allen RN  Outcome: Not Met This Shift  Note: Pt.  Has diminished lung sounds at this time  4/18/2020 2151 by Lewis Aguero RN  Outcome: Met This Shift     Problem: Urinary Elimination:  Goal: Ability to reestablish a normal urinary elimination pattern will improve - after catheter removal  Description: Ability to reestablish a normal urinary elimination pattern will improve  Outcome: Not Met This Shift  Note: Catheter still in place

## 2020-04-20 NOTE — PLAN OF CARE
Problem: Falls - Risk of:  Goal: Will remain free from falls  Description: Will remain free from falls  4/19/2020 2153 by Brent Velarde RN  Outcome: Met This Shift     Problem: Airway Clearance - Ineffective:  Goal: Clear lung sounds  Description: Clear lung sounds  4/19/2020 2153 by Brent Velarde RN  Outcome: Met This Shift

## 2020-04-20 NOTE — PROGRESS NOTES
PROGRESS NOTE    By Sherlyn Taylor D.O GI Fellow    [facility]  Dr. Laura Villegas MD, Dr. Elaine Schirmer, MD, Dr Kelsey England, Dr. Stanley Martinez MD, Dr. Liudmila Peguero, DO      [place]  69 y.o.  female    SUBJECTIVE: Patient is resting comfortably. Patient denies any hematemesis or melena.   Patient does complain of some discomfort around the right-sided chest tube    OBJECTIVE:    BP (!) 140/65   Pulse 71   Temp 97.6 °F (36.4 °C) (Oral)   Resp 18   Ht 5' 5\" (1.651 m)   Wt 244 lb 4 oz (110.8 kg)   SpO2 91%   BMI 40.65 kg/m²     Gen: NAD, AAO x 3  HEENT:PEERL, no icterus  Heart: RRR, no M/R/G  Lungs: CTAB, right-sided chest tube in place with drainage present  Abd.: soft, NT, ND, BS large pannus   Extr.: no C/C/E, no bruising      Stool (measured) : 0 mL  Lab Results   Component Value Date    WBC 5.2 04/20/2020    WBC 6.0 04/19/2020    WBC 5.2 04/18/2020    HGB 8.7 04/20/2020    HGB 8.6 04/19/2020    HGB 8.8 04/18/2020    HCT 30.4 04/20/2020    MCV 83.1 04/20/2020    RDW 18.4 04/20/2020    PLT 88 04/20/2020    PLT 94 04/19/2020    PLT 80 04/18/2020     Lab Results   Component Value Date     04/20/2020    K 4.1 04/20/2020     04/20/2020    CO2 27 04/20/2020    BUN 22 04/20/2020    CREATININE 1.1 04/20/2020    CALCIUM 8.6 04/20/2020    PROT 6.3 04/18/2020    LABALBU 2.8 04/18/2020    LABALBU 4.0 04/30/2012    BILITOT 0.7 04/18/2020    BILITOT 0.6 04/17/2020    BILITOT 0.7 04/15/2020    ALKPHOS 213 04/18/2020    ALKPHOS 222 04/17/2020    ALKPHOS 224 04/15/2020    AST 58 04/18/2020    AST 78 04/17/2020     04/15/2020    ALT 24 04/18/2020    ALT 26 04/17/2020    ALT 30 04/15/2020     Lab Results   Component Value Date    LIPASE 84 03/29/2015    LIPASE 129 03/28/2015    LIPASE 91 04/26/2011     Lab Results   Component Value Date    AMYLASE 58 03/28/2015         ASSESSMENT/PLAN:    1. 1. 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

## 2020-04-20 NOTE — PROGRESS NOTES
[address] [place] [address] [facility]  Progress Note    NAME:Fátima Steele  1945  DATE:04/20/20    FACE TO FACE ENCOUNTER FOR : pneumonia    SUBJECTIVE:  Chief Complaint   Patient presents with    Fatigue     to er via ems from home for complaint of being so weak she can not get out of her chair. this has been ongoing x 1 month. when she gets up, she is falling frequently and hurt her right thigh and left ankle.  Shortness of Breath     x 1 month. also has \"chest discomfort\". in bed   Flat   Has cough moist unable to expectorate  Recurrent right pleural effusion. PROCEDURES PERFORMED:  Bronchoscopy, right video-assisted thoracic surgery. Drainage of pleural effusion, talc pleurodesis. Review of systems:  As stated above in the chief complaint, otherwise negative.     Medications:  Scheduled Meds:   furosemide  20 mg Oral Daily    spironolactone  50 mg Oral Daily    sodium chloride  20 mL Intravenous Once    docusate sodium  100 mg Oral BID    midazolam  1 mg Intravenous Once    lactulose  10 g Oral BID    enoxaparin  40 mg Subcutaneous Daily    sertraline  100 mg Oral Daily    allopurinol  100 mg Oral Daily    nadolol  10 mg Oral Daily    pantoprazole  40 mg Oral BID    gabapentin  100 mg Oral TID    traZODone  100 mg Oral Nightly    cholestyramine light  4 g Oral Daily    levothyroxine  50 mcg Oral QAM AC    cefTRIAXone (ROCEPHIN) IV  1 g Intravenous Q24H    zinc sulfate  50 mg Oral Daily    insulin lispro  0-6 Units Subcutaneous TID WC    insulin lispro  0-3 Units Subcutaneous Nightly     Continuous Infusions:   sodium chloride 50 mL/hr at 04/20/20 0837    dextrose       PRN Meds:HYDROcodone 5 mg - acetaminophen, sennosides-docusate sodium, acetaminophen **OR** acetaminophen, glucose, dextrose, glucagon (rDNA), dextrose    OBJECTIVE:  BP (!) 140/65   Pulse 71   Temp 97.6 °F (36.4 °C) (Oral)   Resp 18   Ht 5' 5\" (1.651 m)   Wt 244 lb 4 oz (110.8 kg)

## 2020-04-20 NOTE — PROGRESS NOTES
4/20/2020  [id number]  [id number]      Patient unavailable for physical therapy treatment due to patient declined at this time due to fatigue and pain. Patient stated she did \"too much today\". Nursing was present and agreed patient had \"been through a lot\". Will attempt treatment at a later time/date.         Junior Clark, [facility]  LIC# [id number]

## 2020-04-20 NOTE — PROGRESS NOTES
Department of Internal Medicine            HISTORY OF PRESENT ILLNESS:      The patient is a 69 y.o. female who presents with chronic right thigh pain is been present for about 5 months with increased weakness and falling. Patient was having increased shortness of breath when laying down. Patient also has chronic bilateral lower extremity edema. Patient initially denied any fever and chills or cough. CT chest done in the ED showed moderate to large right pleural effusion with a large patchy groundglass opacity in left upper lobe as well is a smaller peripheral groundglass opacity in lingula. The patient initially was worked up for [place] [address] and was given to the [facility] hospitalist group. Patient was consult with cardiology, pulmonary, GI and vascular surgery for a VATS procedure. Patient was cleared by cardiology and GI and pulmonary and was set up for surgery today. Patient currently denies any chest pain or abdominal pain. Patient's temperature is been normal throughout hospitalization with current heart rate of 68 and sinus rhythm respiratory rate of 20 and blood pressure currently 148/67. O2 saturation 96% on 1 L nasal cannula. Patient blood sugars range 107- 130 off of her Glucophage. WBC is 2.3 with hemoglobin 7.1. Platelet count is 80. Patient was ordered 2 units of packed RBCs to be transfused this morning. 4/18/2020  Patient was seen and examined on telemetry floor. Case discussed with Dr. Shannon Hayward today. Patient doing very well and has postop discomfort. Blood sugars ranging from 116-165. Hemoglobin is 8.8 with a WBC of 5.2 and platelet count 88. Temperature 97.3 with a heart rate of 70. Blood pressure currently 145/63 with O2 sat in 99%. Urine output is fairly good. 4/19/2020  Patient was seen and examined on telemetry floor. Patient very sleepy today. She complains of spasm around surgical site but otherwise she feels fairly good. Temperature 97.8 with a heart rate of 68.   Respiratory mouth daily (with breakfast)   Yes Historical Provider, MD   zinc sulfate (ORAZINC) 220 (50 Zn) MG capsule Take 1 capsule by mouth daily 10/16/19 10/15/20 Yes Gerson Bah MD   gabapentin (NEURONTIN) 100 MG capsule Take 100 mg by mouth 3 times daily. 3/28/19  Yes Historical Provider, MD   traZODone (DESYREL) 50 MG tablet Take 100 mg by mouth nightly  3/27/19  Yes Historical Provider, MD   furosemide (LASIX) 20 MG tablet Take 20 mg by mouth every morning    Yes Historical Provider, MD   pantoprazole (PROTONIX) 40 MG tablet Take 1 tablet by mouth 2 times daily 5/16/17  Yes Graciela Boucher,    rosuvastatin (CRESTOR) 10 MG tablet Take 10 mg by mouth daily   Yes Historical Provider, MD   nadolol (CORGARD) 20 MG tablet Take 0.5 tablets by mouth daily. 4/17/15  Yes Graciela Boucher, DO   sertraline (ZOLOFT) 50 MG tablet Take 100 mg by mouth daily    Yes Historical Provider, MD   allopurinol (ZYLOPRIM) 100 MG tablet Take 100 mg by mouth daily. Yes Historical Provider, MD   zinc sulfate (ORAZINC) 220 (50 Zn) MG capsule Take 4 capsules by mouth daily 4/15/20 5/15/20  MARIJA Lopes - CNP   influenza virus trivalent vaccine (FLUZONE) injection Inject 0.5 mLs into the muscle once Given 1/2020    Historical Provider, MD       Allergies:  Patient has no known allergies.     Social History:   Social History     Socioeconomic History    Marital status:      Spouse name: Not on file    Number of children: Not on file    Years of education: Not on file    Highest education level: Not on file   Occupational History    Not on file   Social Needs    Financial resource strain: Not on file    Food insecurity     Worry: Not on file     Inability: Not on file   [employer] needs     Medical: Not on file     Non-medical: Not on file   Tobacco Use    Smoking status: Never Smoker    Smokeless tobacco: Never Used   Substance and Sexual Activity    Alcohol use: No    Drug use: No    Sexual activity: Not on file Lifestyle    Physical activity     Days per week: Not on file     Minutes per session: Not on file    Stress: Not on file   Relationships    Social connections     Talks on phone: Not on file     Gets together: Not on file     Attends Mandaen service: Not on file     Active member of club or organization: Not on file     Attends meetings of clubs or organizations: Not on file     Relationship status: Not on file    Intimate partner violence     Fear of current or ex partner: Not on file     Emotionally abused: Not on file     Physically abused: Not on file     Forced sexual activity: Not on file   Other Topics Concern    Not on file   Social History Narrative    Not on file       Family History:   History reviewed. No pertinent family history. REVIEW OF SYSTEMS:    Gen: Patient denies any lightheadedness or dizziness. No LOC or syncope. No fevers or chills. HEENT: No earache, sore throat or nasal congestion. Resp: Denies cough, hemoptysis or sputum production. Cardiac: Denies chest pain, +SOB, no diaphoresis or palpitations. GI: No nausea, vomiting, diarrhea or constipation. No melena or hematochezia. : No urinary complaints, dysuria, hematuria or frequency. MSK: + extremity weakness, paralysis or paresthesias. PHYSICAL EXAM:    Vitals:  BP (!) 140/65   Pulse 71   Temp 97.6 °F (36.4 °C) (Oral)   Resp 18   Ht 5' 5\" (1.651 m)   Wt 244 lb 4 oz (110.8 kg)   SpO2 91%   BMI 40.65 kg/m²     General:  This is a 69 y.o. yo female who is alert and oriented in NAD  HEENT:  Head is normocephalic and atraumatic, PERRLA, EOMI, mucus membranes moist with no pharyngeal erythema or exudate. Neck:  Supple with no carotid bruits, JVD or thyromegaly.   No cervical adenopathy  CV:  Regular rate and rhythm, no murmurs  Lungs: Coarse breath sounds right greater than left to auscultation bilaterally with no wheezes, rales or rhonchi  Abdomen:  Soft, nontender, nondistended,+ large pannus, UROBILINOGEN 0.2 04/14/2020    BILIRUBINUR Negative 04/14/2020    BILIRUBINUR NEGATIVE 12/21/2011    BLOODU SMALL 04/14/2020    GLUCOSEU Negative 04/14/2020    GLUCOSEU 500 12/21/2011     ABG:  No results found for: PH, PCO2, PO2, HCO3, BE, THGB, TCO2, O2SAT  HgBA1c:    Lab Results   Component Value Date    LABA1C 5.5 04/14/2020     FLP:    Lab Results   Component Value Date    TRIG 89 09/07/2019    HDL 33 09/07/2019    LDLCALC 61 09/07/2019    LABVLDL 18 09/07/2019     TSH:    Lab Results   Component Value Date    TSH 8.430 04/14/2020     IRON:    Lab Results   Component Value Date    IRON 45 10/02/2019     LIPASE:    Lab Results   Component Value Date    LIPASE 84 03/29/2015       ASSESSMENT AND PLAN:      Patient Active Problem List    Diagnosis Date Noted    [facility] 04/13/2015     Priority: High    Upper GI bleed 03/28/2015     Priority: High    DVT (deep venous thrombosis) ([facility] 04/13/2015     Priority: Medium    Normocytic anemia 03/28/2015     Priority: Medium    Obesity 03/28/2015     Priority: Medium    Hypothyroidism      Priority: Medium    Hypertension      Priority: Medium    Type 2 diabetes mellitus with other specified complication ([facility]      Priority: Medium    Gout      Priority: Medium    Red blood cell antibody positive 04/15/2020    Iron deficiency anemia 04/15/2020    Recurrent right pleural effusion 04/15/2020    Acute respiratory failure with hypoxia (HCC)     Suspected COVID-19 virus infection     Pneumonia of left lung due to infectious organism     Pancytopenia ([facility]     Hyperlipidemia     Rhabdomyolysis 04/14/2020    Calculus of gallbladder with acute on chronic cholecystitis without obstruction 04/12/2019     1. Acute hypoxic respiratory failure  2. Large right pleural edbgtqed-csoimdyhzrwoi-zrkybv post VATS on 4/17/2020  3. Community-acquired pneumonia  4. Pancytopenia  5. Valentino Decant liver cirrhosis with history of esophageal varices  6. Non-insulin-dependent diabetes mellitus type 2  7. History of DVT with IVC filter in place  8. History of hypothyroidism  9. Chronic kidney disease stage III    Plan:  Rocephin 1 g IV piggyback daily-day 4  IV fluids normal saline 50 cc an hour  Glucoscans 4 times daily with sliding scale insulin  Cardiology, thoracic surgery, GI, pulmonary consulted  Routine labs in a.m.     Cielo Rm D.O.  4/20/2020  11:58 AM

## 2020-04-20 NOTE — CARE COORDINATION
Ss note:4/20/2020.10:34 AM COVID testing negative. Per rounds pt has chest tube. Pt and family deny skilled rehab at discharge. Pt does NOT have home oxygen, currently on 4 liters, family prefers [facility] if needed. DIL requesting Wheelchair at discharge, will need orders,dx and documentation by physician. Active with [facility], will need resume order.  Trev Liu, RADHA

## 2020-04-20 NOTE — PROGRESS NOTES
Pulmonary (Dr. Ray James)    4/20/2020 7:31 AM  Subjective:   Admit Date: 4/14/2020  PCP: Karime Wallace, DO  Interval History: Medications, data and notes reviewed. Feels better. Less dyspnea. She had VATS done yesterday. has a chest tube. Comfortable. Data:   Scheduled Meds:   furosemide  20 mg Oral Daily    spironolactone  50 mg Oral Daily    sodium chloride  20 mL Intravenous Once    docusate sodium  100 mg Oral BID    midazolam  1 mg Intravenous Once    lactulose  10 g Oral BID    enoxaparin  40 mg Subcutaneous Daily    sertraline  100 mg Oral Daily    allopurinol  100 mg Oral Daily    nadolol  10 mg Oral Daily    pantoprazole  40 mg Oral BID    gabapentin  100 mg Oral TID    traZODone  100 mg Oral Nightly    cholestyramine light  4 g Oral Daily    levothyroxine  50 mcg Oral QAM AC    cefTRIAXone (ROCEPHIN) IV  1 g Intravenous Q24H    zinc sulfate  50 mg Oral Daily    insulin lispro  0-6 Units Subcutaneous TID WC    insulin lispro  0-3 Units Subcutaneous Nightly     Continuous Infusions:   sodium chloride 50 mL/hr at 04/19/20 1317    dextrose       PRN Meds:HYDROcodone 5 mg - acetaminophen, sennosides-docusate sodium, acetaminophen **OR** acetaminophen, glucose, dextrose, glucagon (rDNA), dextrose    No intake/output data recorded. Intake/Output Summary (Last 24 hours) at 4/20/2020 0731  Last data filed at 4/20/2020 0700  Gross per 24 hour   Intake 1475 ml   Output 1211 ml   Net 264 ml     -----------------------------------------------------------------  RAD: Xr Ankle Left (min 3 Views)    Result Date: 4/15/2020  EXAMINATION: THREE XRAY VIEWS OF THE LEFT ANKLE 4/15/2020 3:01 pm COMPARISON: None. HISTORY: ORDERING SYSTEM PROVIDED HISTORY: fall TECHNOLOGIST PROVIDED HISTORY: Reason for exam:->fall FINDINGS: Osteopenia. Ankle mortise appears intact. Atherosclerotic calcifications are identified. Calcaneal spurring at the Achilles insertion and plantar aponeurosis.   Midfoot ground-glass opacities in the left upper lobe. The central airways are grossly patent. Upper Abdomen: Splenomegaly. Small amount of ascites. The liver is mildly cirrhotic in appearance. Soft Tissues/Bones: No acute bone or soft tissue abnormality. 1. No evidence of pulmonary embolism. 2. New large patchy ground-glass opacity in the left upper lobe as well as smaller, more peripheral ground-glass opacities in the lingula. The differential favors an infectious process including viral and atypical pneumonia. 3. Moderate to large right pleural effusion. 4. Cirrhotic appearance of the liver with splenomegaly and a small amount of ascites. Us Abdomen Limited    Result Date: 4/15/2020  EXAMINATION: RIGHT UPPER QUADRANT ULTRASOUND 4/15/2020 4:44 pm COMPARISON: 08/13/2018 HISTORY: ORDERING SYSTEM PROVIDED HISTORY: RUQ:  rule out hepatic mass and abdominal for ascites  PROVIDED HISTORY: Reason for exam:->RUQ:  rule out hepatic mass and abdominal for ascites survery Reason for exam:->Abdominal US for ascites survery FINDINGS: LIVER:  There is diffusely increased abnormal echogenicity throughout the liver suggestive of hepatocellular disease. As result there is decreased through transmission of sound which decreases sensitivity for underlying parenchymal mass. BILIARY SYSTEM:  Multiple stones identified within the gallbladder. Sonographic Starling Alexey sign is negative. Common bile duct measures 5 mm. Gallbladder wall thickening measuring 5 mm Common bile duct is within normal limits measuring 5 mm RIGHT KIDNEY: The right kidney is grossly unremarkable without evidence of hydronephrosis. PANCREAS:  Visualized portions of the pancreas are unremarkable. OTHER: There is ascites within the right upper quadrant. Right-sided pleural effusion noted. Ascites within the right upper quadrant and right-sided pleural effusion. Cholelithiasis and gallbladder wall thickening measuring 5 mm.   Clinical

## 2020-04-20 NOTE — PLAN OF CARE
Problem: Falls - Risk of:  Goal: Will remain free from falls  Description: Will remain free from falls  4/20/2020 0409 by Rose Douglas RN  Outcome: Met This Shift  4/19/2020 2153 by Miguel Kasper RN  Outcome: Met This Shift  Goal: Absence of physical injury  Description: Absence of physical injury  Outcome: Met This Shift     Problem: Discharge Planning:  Goal: Participates in care planning  Description: Participates in care planning  Outcome: Met This Shift     Problem: Airway Clearance - Ineffective:  Goal: Clear lung sounds  Description: Clear lung sounds  4/20/2020 0409 by Rose Douglas RN  Outcome: Met This Shift  4/19/2020 2153 by Miguel Kasper RN  Outcome: Met This Shift  Goal: Ability to maintain a clear airway will improve  Description: Ability to maintain a clear airway will improve  Outcome: Met This Shift     Problem: Fluid Volume - Deficit:  Goal: Achieves intake and output within specified parameters  Description: Achieves intake and output within specified parameters  Outcome: Met This Shift     Problem: Gas Exchange - Impaired:  Goal: Levels of oxygenation will improve  Description: Levels of oxygenation will improve  Outcome: Met This Shift     Problem: Pain:  Goal: Pain level will decrease  Description: Pain level will decrease  Outcome: Met This Shift  Goal: Control of acute pain  Description: Control of acute pain  Outcome: Met This Shift  Goal: Control of chronic pain  Description: Control of chronic pain  Outcome: Met This Shift     Problem: Urinary Elimination:  Goal: Signs and symptoms of infection will decrease  Description: Signs and symptoms of infection will decrease  Outcome: Met This Shift  Goal: Complications related to the disease process, condition or treatment will be avoided or minimized  Description: Complications related to the disease process, condition or treatment will be avoided or minimized  Outcome: Met This Shift     Problem: Discharge Planning:  Goal: Discharged to appropriate level of care  Description: Discharged to appropriate level of care  Outcome: Not Met This Shift  Note: Pt.  Not discharged yet     Problem: Urinary Elimination:  Goal: Ability to reestablish a normal urinary elimination pattern will improve - after catheter removal  Description: Ability to reestablish a normal urinary elimination pattern will improve  Outcome: Not Met This Shift  Note: Pt. Still has catheter

## 2020-04-21 NOTE — PROGRESS NOTES
[address] [place] [address] [facility]  Progress Note    NAME:Fátima Mera  1945  DATE:04/21/20    FACE TO FACE ENCOUNTER FOR : pneumonia    SUBJECTIVE:  Chief Complaint   Patient presents with    Fatigue     to er via ems from home for complaint of being so weak she can not get out of her chair. this has been ongoing x 1 month. when she gets up, she is falling frequently and hurt her right thigh and left ankle.  Shortness of Breath     x 1 month. also has \"chest discomfort\". in bed   Working with pt  Flat   Has cough moist unable to expectorate  Recurrent right pleural effusion. PROCEDURES PERFORMED:  Bronchoscopy, right video-assisted thoracic surgery. Drainage of pleural effusion, talc pleurodesis. Asking about d/c   [id number]  Review of systems:  As stated above in the chief complaint, otherwise negative.     Medications:  Scheduled Meds:   guaiFENesin  1,200 mg Oral BID    methylPREDNISolone  60 mg Intravenous Q6H    sodium chloride (Inhalant)  4 mL Nebulization BID    furosemide  20 mg Oral Daily    spironolactone  50 mg Oral Daily    sodium chloride  20 mL Intravenous Once    docusate sodium  100 mg Oral BID    midazolam  1 mg Intravenous Once    lactulose  10 g Oral BID    enoxaparin  40 mg Subcutaneous Daily    sertraline  100 mg Oral Daily    allopurinol  100 mg Oral Daily    nadolol  10 mg Oral Daily    pantoprazole  40 mg Oral BID    gabapentin  100 mg Oral TID    traZODone  100 mg Oral Nightly    cholestyramine light  4 g Oral Daily    levothyroxine  50 mcg Oral QAM AC    cefTRIAXone (ROCEPHIN) IV  1 g Intravenous Q24H    zinc sulfate  50 mg Oral Daily    insulin lispro  0-6 Units Subcutaneous TID     insulin lispro  0-3 Units Subcutaneous Nightly     Continuous Infusions:   sodium chloride 50 mL/hr at 04/21/20 0309    dextrose       PRN Meds:HYDROcodone 5 mg - acetaminophen, sennosides-docusate sodium, acetaminophen **OR** acetaminophen,

## 2020-04-21 NOTE — PLAN OF CARE
Problem: Inadequate oral food/beverage intake (NI-2.1)  Goal: Food and/or Nutrient Delivery  Description: Start ONS  Outcome: Met This Shift

## 2020-04-21 NOTE — PROGRESS NOTES
Department of Internal Medicine            HISTORY OF PRESENT ILLNESS:      The patient is a 69 y.o. female who presents with chronic right thigh pain is been present for about 5 months with increased weakness and falling. Patient was having increased shortness of breath when laying down. Patient also has chronic bilateral lower extremity edema. Patient initially denied any fever and chills or cough. CT chest done in the ED showed moderate to large right pleural effusion with a large patchy groundglass opacity in left upper lobe as well is a smaller peripheral groundglass opacity in lingula. The patient initially was worked up for [place] [address] and was given to the [address] [place], [place] [address]ist group. Patient was consult with cardiology, pulmonary, GI and vascular surgery for a VATS procedure. Patient was cleared by cardiology and GI and pulmonary and was set up for surgery today. Patient currently denies any chest pain or abdominal pain. Patient's temperature is been normal throughout hospitalization with current heart rate of 68 and sinus rhythm respiratory rate of 20 and blood pressure currently 148/67. O2 saturation 96% on 1 L nasal cannula. Patient blood sugars range 107- 130 off of her Glucophage. WBC is 2.3 with hemoglobin 7.1. Platelet count is 80. Patient was ordered 2 units of packed RBCs to be transfused this morning. 4/18/2020  Patient was seen and examined on telemetry floor. Case discussed with Dr. Brian Stern today. Patient doing very well and has postop discomfort. Blood sugars ranging from 116-165. Hemoglobin is 8.8 with a WBC of 5.2 and platelet count 88. Temperature 97.3 with a heart rate of 70. Blood pressure currently 145/63 with O2 sat in 99%. Urine output is fairly good. 4/19/2020  Patient was seen and examined on telemetry floor. Patient very sleepy today. She complains of spasm around surgical site but otherwise she feels fairly good. Temperature 97.8 with a heart rate of 68.   Respiratory rate 18 with blood pressure 129/62. Patient currently on 4 L nasal cannula with O2 sat range %. Serum creatinine went up to 1.2 today with a BUN of 23. Blood sugars ranging 132-159. Hemoglobin is 8.6 and WBC is 6.0 and a platelet count 94. .    4/20/2020  Patient was seen and examined on telemetry floor. Temperature 97.6 with a heart rate of 71. Patient is sleepy again today. The patient has postop discomfort. Patient denies any abdominal pain, nausea or vomiting. Patient has a moist nonproductive cough off and on. Blood pressure currently 140/65. O2 sat ranged from 91-95% on 4 L nasal cannula. Urine output 125- 200 cc a shift. Oral intake is poor. BUN/creatinine is 22/1.1. Blood sugars ranging from 145-231. REN globin is 8.7 with a WBC of 5.2.    4/21/2020  Patient was seen and examined on telemetry floor. Patient has postop discomfort. Patient O2 nasal cannula has been increased over the past 24 hours. She denies any abdominal pain or nausea. Blood sugars range 143-182. Temperature is 97.5 with a heart rate is 70 and respiratory rate 20. Blood pressure 132/60. O2 sats 99% on 7 L nasal cannula. Oral intake is poor with patient is urine output ranging 150-275 cc a shift.     Past Medical History:    Past Medical History:   Diagnosis Date    Blood transfusion     Gout     Hx of blood clots     right knee    Hyperlipidemia     Hypertension     Hypothyroidism     [facility]bid [facility]     Non-insulin dependent type 2 diabetes mellitus ([facility]      Past Surgical History:    Past Surgical History:   Procedure Laterality Date    ENDOSCOPY, COLON, DIAGNOSTIC      HERNIA REPAIR      umbillical    KNEE ARTHROSCOPY  11/8/2012    right knee arthroscopy    OTHER SURGICAL HISTORY N/A 4-13-15    push enteroscopy    OTHER SURGICAL HISTORY      edd filter      2 months    THORACOSCOPY Right 4/17/2020    FIBEROPTIC BRONCHOSCOPY, RIGHT VIDEO ASSISTED THORACOSCOPY DECORTACATION AND

## 2020-04-21 NOTE — PROGRESS NOTES
upper lobe as well as smaller, more peripheral ground-glass opacities in the left upper lobe. The central airways are grossly patent. Upper Abdomen: Splenomegaly. Small amount of ascites. The liver is mildly cirrhotic in appearance. Soft Tissues/Bones: No acute bone or soft tissue abnormality. 1. No evidence of pulmonary embolism. 2. New large patchy ground-glass opacity in the left upper lobe as well as smaller, more peripheral ground-glass opacities in the lingula. The differential favors an infectious process including viral and atypical pneumonia. 3. Moderate to large right pleural effusion. 4. Cirrhotic appearance of the liver with splenomegaly and a small amount of ascites. Us Abdomen Limited    Result Date: 4/15/2020  EXAMINATION: RIGHT UPPER QUADRANT ULTRASOUND 4/15/2020 4:44 pm COMPARISON: 08/13/2018 HISTORY: ORDERING SYSTEM PROVIDED HISTORY: RUQ:  rule out hepatic mass and abdominal for ascites  PROVIDED HISTORY: Reason for exam:->RUQ:  rule out hepatic mass and abdominal for ascites survery Reason for exam:->Abdominal US for ascites survery FINDINGS: LIVER:  There is diffusely increased abnormal echogenicity throughout the liver suggestive of hepatocellular disease. As result there is decreased through transmission of sound which decreases sensitivity for underlying parenchymal mass. BILIARY SYSTEM:  Multiple stones identified within the gallbladder. Sonographic Charli Clos sign is negative. Common bile duct measures 5 mm. Gallbladder wall thickening measuring 5 mm Common bile duct is within normal limits measuring 5 mm RIGHT KIDNEY: The right kidney is grossly unremarkable without evidence of hydronephrosis. PANCREAS:  Visualized portions of the pancreas are unremarkable. OTHER: There is ascites within the right upper quadrant. Right-sided pleural effusion noted. Ascites within the right upper quadrant and right-sided pleural effusion.  Cholelithiasis and gallbladder wall 2020  MELANIE Very likely  Morbid obesity/BMI 40  Severe deconditioning  Mild pulmonary hypertension  History of HERNANDEZ  History of DVT with IVC filter placement     Plan:   1. Continue BiPAP prn, pt encouraged to use as much as possible  2. Add mucolytic medications  3. Solumedrol for several doses  4. Incentive spirometry  5.  Repeat CXR      Joanne aHnd MD

## 2020-04-21 NOTE — CARE COORDINATION
Ss note: 4/21/2020.9:56 AM NO covid testing done. Per rounds pt continues with a chest tube, is on 15 liters/uses bipap. Pt and family deny snf placement. SW following for 02 needs, requesting w/c, will need orders for [facility]- medical. Active with [facility], need MICHELLE at discharge.  Trev Liu, W

## 2020-04-21 NOTE — PROGRESS NOTES
alternative etiologies   - MELD-NA 7  <2% 90 day mortality   - Hgb at baseline, no GI bleed on exam   - Most recent EGD in the office will review   - RUQ negative for mass AFP 3 in September repeat now   - Continue with  Laculose 10g BID to pts regimen          2. Recurrent Right Sided Pleural Effusion   - Pt with CTA chest in September 2019 and repeat on April 2020 patient with recurrent right-sided pleural effusion  - Concern for hepatic hydrothorax  - Recent Echo EF 71%   -Abdominal US with RUQ ascites not amenibalt to a paracentetics   -  Lasix 40mg and Aldactone 100mg held Cr 1.1 hold for additional day   - Place pt on 2gm NA diet   - S/P VATs with chest tube placement, monitor chest tube drainage   - If chest tube cannot be removed pt will require  TIPS procedure which will require transfer to a tertiary center        Repeat lab work this morning is pending. Patient did undergo repeat pleurodesis with thoracic surgery yesterday at the bedside with talc. Continue to monitor patient's chest tube output. Pt was discussed with Dr. Betsy Prabhakar DO  GI Fellow   4/21/2020  9:36 AM    Pt seen and independently examined. Pertinent notes and lab work reviewed. D/w Dr. Thao Session. Agree with physical exam and A&P.     Jeffery Davis MD  4/21/2020  10:55 AM

## 2020-04-21 NOTE — PLAN OF CARE
Problem: Falls - Risk of:  Goal: Will remain free from falls  Description: Will remain free from falls  Outcome: Met This Shift  Goal: Absence of physical injury  Description: Absence of physical injury  Outcome: Met This Shift     Problem: Discharge Planning:  Goal: Participates in care planning  Description: Participates in care planning  Outcome: Met This Shift     Problem: Airway Clearance - Ineffective:  Goal: Ability to maintain a clear airway will improve  Description: Ability to maintain a clear airway will improve  Outcome: Met This Shift     Problem: Fluid Volume - Deficit:  Goal: Achieves intake and output within specified parameters  Description: Achieves intake and output within specified parameters  Outcome: Met This Shift     Problem: Pain:  Goal: Pain level will decrease  Description: Pain level will decrease  Outcome: Met This Shift  Goal: Control of acute pain  Description: Control of acute pain  Outcome: Met This Shift  Goal: Control of chronic pain  Description: Control of chronic pain  Outcome: Met This Shift     Problem: Urinary Elimination:  Goal: Signs and symptoms of infection will decrease  Description: Signs and symptoms of infection will decrease  Outcome: Met This Shift  Goal: Complications related to the disease process, condition or treatment will be avoided or minimized  Description: Complications related to the disease process, condition or treatment will be avoided or minimized  Outcome: Met This Shift     Problem: Falls - Risk of:  Goal: Absence of physical injury  Description: Absence of physical injury  Outcome: Met This Shift     Problem: Discharge Planning:  Goal: Discharged to appropriate level of care  Description: Discharged to appropriate level of care  Outcome: Not Met This Shift  Note: Pt.  Not discharged yet     Problem: Airway Clearance - Ineffective:  Goal: Clear lung sounds  Description: Clear lung sounds  Outcome: Not Met This Shift  Note: Lungs diminished this shift     Problem: Gas Exchange - Impaired:  Goal: Levels of oxygenation will improve  Description: Levels of oxygenation will improve  Outcome: Not Met This Shift  Note: Pt.  Now on continuous bipap     Problem: Urinary Elimination:  Goal: Ability to reestablish a normal urinary elimination pattern will improve - after catheter removal  Description: Ability to reestablish a normal urinary elimination pattern will improve  Outcome: Not Met This Shift  Note: Pt. Still has catheter in

## 2020-04-21 NOTE — PROGRESS NOTES
tubes intact, call light within reach. Overall, pt demonstrated decreased independence and safety during completion of ADL/functional transfers/mobility tasks. Pt would benefit from continued skilled OT to increase safety and independence with completion of ADL/IADL tasks for functional independence and quality of life. · Pt has made fair progress towards set goals (as noted through increased balance during ADL tasks).     · Continue with current plan of care    Treatment Time In: 11:07am          Treatment Time Out: 11:56am                Treatment Charges: Mins Units   ADL/Home Mgt                    26328 24    Therapeutic Activities          50355 25    Therapeutic Exercise           49921     Manual Therapy                   09029     Neuro Re-ed                        67209     Orthotic manage/training     90243     Total Timed Treatment 49 80 [facility] OTR/L [id number]

## 2020-04-21 NOTE — PROGRESS NOTES
[facility] Quality Flow/Interdisciplinary Rounds Progress Note        Quality Flow Rounds held on April 21, 2020    Disciplines Attending:  Bedside Nurse, ,  and Nursing Unit Leadership    Ranjana Banks was admitted on 4/14/2020  7:32 AM    Anticipated Discharge Date:  Expected Discharge Date: 04/16/20    Disposition:    Vinicio Score:  Vinicio Scale Score: 13    Readmission Risk              Risk of Unplanned Readmission:        19           Discussed patient goal for the day, patient clinical progression, and barriers to discharge.   The following Goal(s) of the Day/Commitment(s) have been identified:  IMCU Pt, CT, TLC, stewart, V SM, IV F,q 2 benji,       Benjamin Delgado  April 21, 2020

## 2020-04-21 NOTE — PROGRESS NOTES
with OT present to assist with lunch     Therapeutic Exercises: not performed   At end of session, patient was in bed with bariatric low air loss bed , call light and phone within reach, all lines were intact, and nursing was notified. ASSESSMENT:   She required encouragement throughout due to fear of falling and fatigue. Patient fatigued with activity with decreased strength and endurance limiting function. Patient would benefit from continued skilled Physical Therapy to improve functional independence and quality of life. PLAN:    Patient is making good progress towards established goals, will continue with current plan of care.       Time in: 1106  Time out: 1145    Total Treatment Time:  39minutes    CPT codes:  Therapeutic activities (04103)   39 minutes  3 unit(s)    Naye Gamez PTA   LIC# [id number]

## 2020-04-22 NOTE — PROGRESS NOTES
includes  review of current medical information, gathering information on past medical history/social history and prior level of function, completion of standardized testing/informal observation of tasks, assessment of data, and development of POC/Goals. [x]The admitting diagnosis and active problem list, as listed below have been reviewed prior to initiation of this evaluation.      ADMITTING DIAGNOSIS: Rhabdomyolysis [M62.82]  Rhabdomyolysis [M62.82]     ACTIVE PROBLEM LIST:   Patient Active Problem List   Diagnosis    Hypothyroidism    Hypertension    Type 2 diabetes mellitus with other specified complication ([facility] [address]    Gout    Upper GI bleed    Normocytic anemia    Obesity    Esophageal varices (HCC)    DVT (deep venous thrombosis) (HCC)    Calculus of gallbladder with acute on chronic cholecystitis without obstruction    Rhabdomyolysis    Red blood cell antibody positive    Iron deficiency anemia    Recurrent right pleural effusion    Acute respiratory failure with hypoxia (HCC)    Suspected COVID-19 virus infection    Pneumonia of left lung due to infectious organism    Pancytopenia ([facility] [address]    Hyperlipidemia       Rony Velarde MSCCC/SLP  Speech Language Pathologist  [id number]

## 2020-04-22 NOTE — PROGRESS NOTES
Occupational Therapy  OT BEDSIDE TREATMENT NOTE      Date:2020  Patient Name: Zenobia Castellanos  MRN: [id number]  : 1945  Room: [address] [place] [address]     Referring Segun Godoy MD  Evaluating OT: Justin Bah OTR/L [id number]     Placement Recommendation: Subacute    Recommended Adaptive Equipment: TBD at rehab     AM-PAC Daily Activity Inpatient   How much help for putting on and taking off regular lower body clothing?: Total  How much help for Bathing?: A Lot  How much help for Toileting?: Total  How much help for putting on and taking off regular upper body clothing?: A Lot  How much help for taking care of personal grooming?: A Lot  How much help for eating meals?: A Lot  AM-PAC Inpatient Daily Activity Raw Score: 10  AM-PAC Inpatient ADL T-Scale Score : 27.31  ADL Inpatient CMS 0-100% Score: 74.7  ADL Inpatient CMS G-Code Modifier : CL     Diagnosis:   1. Non-traumatic rhabdomyolysis    2. Pancytopenia ([facility] [address]    3. Suspected COVID-19 virus infection       Pertinent Medical History:   Past Medical History           Past Medical History:   Diagnosis Date    Blood transfusion      Gout      Hx of blood clots       right knee    Hyperlipidemia      Hypertension      Hypothyroidism      Morbid obesity (HCC)      Non-insulin dependent type 2 diabetes mellitus (HCC)           Precautions:  falls, low air loss bed   Pain Scale: Numeric Rate: L ankle pain 5/10; Nursing notified.         Social history: with family : spouse     Home architecture: single family home, 1 story, ramp to enter, walk in shower.    PLOF: Needs assistance with BADL and IADL, ambulated with wheeled walker short distances   Equipment owned: quad cane, wheeled walker, tub bench, elevated toilet seat, grab bars, rollator   Cognition: oriented x 4; follows 3 step directions.               good  Problem solving skills              good  Memory               good  Sequencing  Communication: intact   Visual perceptual nursing aware. Pt demonstrated fair understanding of education/techniques and decreased independence and safety during completion of ADL/functional transfer/mobility tasks. Pt would benefit from continued skilled OT to increase safety and independence with completion of ADL/IADL tasks for functional independence and quality of life. Pt has made fair progress towards set goals. Continue with current plan of care. Treatment:  Skilled occupational therapy services provided include instruction/training on safety and adapted techniques for completion of therapeutic activities, ADLs/IADLs. Therapist facilitated graded functional activities (functional reaching) - providing min cuing (verbal, visual, tactile) on osture, breathing techniques, and safety. Therapist facilitated self-care retraining: UB dressing, grooming tasks - providing min cuing (verbal, visual, tactile) on body mechanics, posture, breathing techniques, energy conservation, compensatory strategies, and safety. Therapist educated pt on compensatory strategies/energy conservation techniques to safely complete ADLs/IADLs. Skilled monitoring of O2 sats, HR, and pt response throughout treatment.       Treatment Time In:1415         Treatment Time Out: 0730               Treatment Charges: Mins Units   Ther Ex  71890     Manual Therapy 91380     Thera Activities 60677 2    ADL/Home Mgt 62683 8 1   Neuro Re-ed 21773     Group Therapy      Orthotic manage/training  64345     Non-Billable Time     Total Timed Treatment 10 3050 [facility], OTR/L #[id number]

## 2020-04-22 NOTE — PROGRESS NOTES
diabetes mellitus ([facility] [address]      Past Surgical History:    Past Surgical History:   Procedure Laterality Date    ENDOSCOPY, COLON, DIAGNOSTIC      HERNIA REPAIR      umbillical    KNEE ARTHROSCOPY  11/8/2012    right knee arthroscopy    OTHER SURGICAL HISTORY N/A 4-13-15    push enteroscopy    OTHER SURGICAL HISTORY      edd filter      2 months    THORACOSCOPY Right 4/17/2020    FIBEROPTIC BRONCHOSCOPY, RIGHT VIDEO ASSISTED THORACOSCOPY DECORTACATION AND PLEURIDESIS performed by Junior Lackey MD at [place] [address]         Medications Prior to Admission:    @  Prior to Admission medications    Medication Sig Start Date End Date Taking? Authorizing Provider   colestipol (COLESTID) 1 g tablet Take 1 g by mouth 2 times daily   Yes Historical Provider, MD   levothyroxine (SYNTHROID) 50 MCG tablet Take 50 mcg by mouth every morning (before breakfast)   Yes Historical Provider, MD   metFORMIN (GLUCOPHAGE) 500 MG tablet Take 500 mg by mouth daily (with breakfast)   Yes Historical Provider, MD   zinc sulfate (ORAZINC) 220 (50 Zn) MG capsule Take 1 capsule by mouth daily 10/16/19 10/15/20 Yes Fidelia Woodruff MD   gabapentin (NEURONTIN) 100 MG capsule Take 100 mg by mouth 3 times daily. 3/28/19  Yes Historical Provider, MD   traZODone (DESYREL) 50 MG tablet Take 100 mg by mouth nightly  3/27/19  Yes Historical Provider, MD   furosemide (LASIX) 20 MG tablet Take 20 mg by mouth every morning    Yes Historical Provider, MD   pantoprazole (PROTONIX) 40 MG tablet Take 1 tablet by mouth 2 times daily 5/16/17  Yes Stacie Renteria DO   rosuvastatin (CRESTOR) 10 MG tablet Take 10 mg by mouth daily   Yes Historical Provider, MD   nadolol (CORGARD) 20 MG tablet Take 0.5 tablets by mouth daily. 4/17/15  Yes Stacie Renteria DO   sertraline (ZOLOFT) 50 MG tablet Take 100 mg by mouth daily    Yes Historical Provider, MD   allopurinol (ZYLOPRIM) 100 MG tablet Take 100 mg by mouth daily.      Yes Historical Provider, MD zinc sulfate (ORAZINC) 220 (50 Zn) MG capsule Take 4 capsules by mouth daily 4/15/20 5/15/20  MARIJA Moe CNP   influenza virus trivalent vaccine (FLUZONE) injection Inject 0.5 mLs into the muscle once Given 1/2020    Historical Provider, MD       Allergies:  Patient has no known allergies. Social History:   Social History     Socioeconomic History    Marital status:      Spouse name: Not on file    Number of children: Not on file    Years of education: Not on file    Highest education level: Not on file   Occupational History    Not on file   Social Needs    Financial resource strain: Not on file    Food insecurity     Worry: Not on file     Inability: Not on file   [employer] needs     Medical: Not on file     Non-medical: Not on file   Tobacco Use    Smoking status: Never Smoker    Smokeless tobacco: Never Used   Substance and Sexual Activity    Alcohol use: No    Drug use: No    Sexual activity: Not on file   Lifestyle    Physical activity     Days per week: Not on file     Minutes per session: Not on file    Stress: Not on file   Relationships    Social connections     Talks on phone: Not on file     Gets together: Not on file     Attends Sikhism service: Not on file     Active member of club or organization: Not on file     Attends meetings of clubs or organizations: Not on file     Relationship status: Not on file    Intimate partner violence     Fear of current or ex partner: Not on file     Emotionally abused: Not on file     Physically abused: Not on file     Forced sexual activity: Not on file   Other Topics Concern    Not on file   Social History Narrative    Not on file       Family History:   History reviewed. No pertinent family history. REVIEW OF SYSTEMS:    Gen: Patient denies any lightheadedness or dizziness. No LOC or syncope. No fevers or chills. HEENT: No earache, sore throat or nasal congestion.     Resp: Denies cough, hemoptysis or sputum production. Cardiac: Denies chest pain, +SOB, no diaphoresis or palpitations. GI: No nausea, vomiting, diarrhea or constipation. No melena or hematochezia. : No urinary complaints, dysuria, hematuria or frequency. MSK: + extremity weakness, paralysis or paresthesias. PHYSICAL EXAM:    Vitals:  BP (!) 141/63   Pulse 64   Temp 97.6 °F (36.4 °C) (Oral)   Resp 18   Ht 5' 5\" (1.651 m)   Wt 244 lb 4 oz (110.8 kg)   SpO2 96%   BMI 40.65 kg/m²     General:  This is a 69 y.o. yo female who is alert and oriented in NAD  HEENT:  Head is normocephalic and atraumatic, PERRLA, EOMI, mucus membranes moist with no pharyngeal erythema or exudate. Neck:  Supple with no carotid bruits, JVD or thyromegaly.   No cervical adenopathy  CV:  Regular rate and rhythm, no murmurs  Lungs: Coarse breath sounds right greater than left to auscultation bilaterally with no wheezes, rales or rhonchi  Abdomen:  Soft, nontender, nondistended,+ large pannus, bowel sounds present  Extremities:  + Trace- + 1 lower leg edema, peripheral pulses intact bilaterally  Neuro:  Cranial nerves II-XII grossly intact; motor and sensory function intact with no focal deficits  Skin:  No rashes, lesions or wounds    DATA:  CBC with Differential:    Lab Results   Component Value Date    WBC 5.2 04/20/2020    RBC 3.66 04/20/2020    HGB 8.7 04/20/2020    HCT 30.4 04/20/2020    PLT 88 04/20/2020    MCV 83.1 04/20/2020    MCH 23.8 04/20/2020    MCHC 28.6 04/20/2020    RDW 18.4 04/20/2020    NRBC 1 04/14/2015    SEGSPCT 54 05/29/2013    LYMPHOPCT 11.3 04/20/2020    MONOPCT 1.7 04/20/2020    MYELOPCT 0.9 04/15/2020    BASOPCT 0.2 04/20/2020    MONOSABS 0.10 04/20/2020    LYMPHSABS 0.57 04/20/2020    EOSABS 0.00 04/20/2020    BASOSABS 0.00 04/20/2020     CMP:    Lab Results   Component Value Date     04/22/2020    K 4.6 04/22/2020     04/22/2020    CO2 22 04/22/2020    BUN 26 04/22/2020    CREATININE 0.9 04/22/2020    GFRAA >60

## 2020-04-22 NOTE — PROGRESS NOTES
°F (37.2 °C)   Resp 16   Ht 5' 5\" (1.651 m)   Wt 244 lb 4 oz (110.8 kg)   SpO2 98%   BMI 40.65 kg/m²   Temp  Av.9 °F (36.6 °C)  Min: 97.1 °F (36.2 °C)  Max: 98.9 °F (37.2 °C)  Constitutional:  The patient is awake, alert, and oriented. pale  Skin:    Warm and dry. No rashes were noted. HEENT:    AT/NC  Chest:   No use of accessory muscles to breathe. .  On o2 dec bs ant  CT right serosang  Cardiovascular:  S1 and S2 are rhythmic and regular. Abdomen:   Positive bowel sounds to auscultation. Benign to palpation. pannus  Extremities:   No clubbing, no cyanosis,  edema. CNS    AAxO  Psych flat    Lines:  piv    Radiology:   Patchy airspace opacities bilaterally, may be related to pulmonary edema  versus pneumonia, improved the right lung apex, mildly increased at the right  lung base. Minimal bilateral pleural effusions. Stable right-sided chest tube.  No definite pneumothorax. Stable mild cardiomegaly.     Laboratory and Tests Review:  Lab Results   Component Value Date    WBC 5.2 2020    WBC 6.0 2020    WBC 5.2 2020    HGB 8.7 (L) 2020    HCT 30.4 (L) 2020    MCV 83.1 2020    PLT 88 (L) 2020     No results found for: [facility]  Lab Results   Component Value Date    ALT 16 2020    AST 31 2020    ALKPHOS 250 (H) 2020    BILITOT 0.6 2020     Lab Results   Component Value Date     2020    K 4.6 2020     2020    CO2 22 2020    BUN 26 2020    CREATININE 0.9 2020    CREATININE 1.1 2020    CREATININE 1.2 2020    GFRAA >60 2020    LABGLOM >60 2020    GLUCOSE 172 2020    GLUCOSE 237 2012    PROT 5.8 2020    LABALBU 2.0 2020    LABALBU 4.0 2012    CALCIUM 9.2 2020    BILITOT 0.6 2020    ALKPHOS 250 2020    AST 31 2020    ALT 16 2020     Lab Results   Component Value Date    CRP 1.4 (H) 2020     Lab Results

## 2020-04-22 NOTE — PROGRESS NOTES
due to logistical reasons not related to physiologic impairment. Pharyngoesophageal segment opening was completely distended for complete duration with no obstruction of bolus flow. Tongue base retraction allowed no contrast between the retracted tongue base and the posterior pharyngeal wall. Pharyngeal residue was not present. Esophageal clearance in the upright position could not be assessed due to logistical reasons not related to physiologic impairment. Laryngeal Penetration and Aspiration:  Neither penetration nor aspiration was observed in today's study with Pudding-thick, and Nectar-thick,    Very shallow transient penetration with Thin. Current Respiratory Status   nasal canula         COMPENSATORY STRATEGIES       Compensatory strategies were not attempted      STRUCTURAL/FUNCTIONAL ANOMALIES       No structural/functional anomalies were noted    CERVICAL ESOPHAGEAL STAGE :        The cervical esophagus appeared adequate                                     The Speech Language Pathologist (SLP) completed education with the patient regarding results of evaluation. Explained that Speech Pathology intervention is not warranted  at this time   Prognosis for improvements is good     This plan will be re-evaluated and revised in 1 week  if warranted. Patient stated goals: Agreed with above,   Treatment goals discussed with Patient   The Patient understand(s) the diagnosis, prognosis and plan of care      Evaluation time includes  review of current medical information, gathering information on past medical history/social history and prior level of function, completion of standardized testing/informal observation of tasks, assessment of data, and development of POC/Goals. CPT code:  56033  dysphagia study    [x]The admitting diagnosis and active problem list, as listed below have been reviewed prior to initiation of this evaluation.      ADMITTING DIAGNOSIS: Rhabdomyolysis [M62.82]  Rhabdomyolysis [M62.82]     ACTIVE PROBLEM LIST:   Patient Active Problem List   Diagnosis    Hypothyroidism    Hypertension    Type 2 diabetes mellitus with other specified complication ([facility] [address]    Gout    Upper GI bleed    Normocytic anemia    Obesity    Esophageal varices (HCC)    DVT (deep venous thrombosis) (HCC)    Calculus of gallbladder with acute on chronic cholecystitis without obstruction    Rhabdomyolysis    Red blood cell antibody positive    Iron deficiency anemia    Recurrent right pleural effusion    Acute respiratory failure with hypoxia (HCC)    Suspected COVID-19 virus infection    Pneumonia of left lung due to infectious organism    Pancytopenia ([facility] [address]    Hyperlipidemia       Madelyn Mcelroy MSCCC/SLP  Speech Language Pathologist  [id number]

## 2020-04-22 NOTE — ADT AUTH CERT
Utilization Reviews         Pneumonia - Care Day 9 (4/22/2020) by Gregorio Garcia RN         Review Status Review Entered   Completed 4/22/2020 14:52       Criteria Review      Care Day: 9 Care Date: 4/22/2020 Level of Care:    Guideline Day 3    Level Of Care    ( ) Floor to discharge [D]    4/22/2020 2:52 PM EDT by Danilo Flores      4/22 - remains on tele unit    Clinical Status    (X) * Hemodynamic stability    4/22/2020 2:52 PM EDT by Danilo Flores      98.9 16 63 153/66    (X) * Afebrile, or temperature acceptable for next level of care    4/22/2020 2:52 PM EDT by Danilo Flores      afebrile    ( ) * Tachypnea absent    ( ) * Hypoxemia absent    ( ) * Mental status at baseline    ( ) * Antibiotic regimen acceptable for next level of care    ( ) * Discharge plans and education understood    Activity    ( ) * Ambulatory    Routes    (X) * Oral hydration, medications, and diet    4/22/2020 2:52 PM EDT by Danilo Flores      carb controlled diet    Interventions    ( ) * Oxygen absent or at baseline need    4/22/2020 2:52 PM EDT by Danilo Flores      tolerated bipap overnight   at time of review: weaned from [place] to [address] [employer] w/ sat 98%   pt on no home oxygen    * Milestone   Additional Notes   4/22   remains on tele unit       s/p 4/17 bronchoscopy, right VATS, drainage of pleural    effusion, talc pleurodesis       98.9 16 63 153/66      tolerated bipap overnight    at time of review: weaned from [place] to [address] w/ sat 98%    +moist non-productive cough    remains w/ rt CT to LIS  .. drained 935cc yesterday ** **       post op pain controlled -> cont norco 5/325 q6h prn pain with   no doses taken at time of review       c/o nausea -> cont iv zofran 4mg q6h prn n/v x1 at time of review       bs 172 -> cont ISS coverage w/ bs qac&hs    bun 26  cr. 0.9      pcxr:   1.  Slightly decreased central predominant airspace and interstitial       opacities suggestive of improved edema in the setting of       congestive heart failure given mild cardiomegaly.        Pneumonia could appear similar. 2. Unchanged bibasilar airspace opacities worse on the right       likely due primarily to atelectasis, although superimposed pneumonia       or aspiration could be present on the right. 3. Persistence of at least trace right pleural effusion. PULM A/P:   She used BiPAP more during the night, she is a little better this am.     CXR reviewed, slight improvement. CTS A/P:   Feels better   vss   Ct draining serous fluid   Dry dressing   Cor rrr   Chest reduced bs   abdo benign   Continue current care      PCP A/P:   Patient was seen and examined on telemetry floor.     Patient has expected postop discomfort.     She denies any other chest discomfort, abdominal pain.     Patient has a weak nonproductive cough more prominent recently.     Blood sugars range from 143-201.  BUN/creatinine 26/0.9.     Serum albumin is 2.0.  Blood pressure ranges 128//63.     Temperature is 97.6 with a heart rate of 64.     Patient currently on 6 L nasal cannula with O2 sat 96%.     Urine output is good. Consult speech therapy for swallowing eval      barium swallow:    Swallowing mechanism grossly within normal limits    without evidence of aspiration. Speech therapy consult pend. at time of review       cont zyloprim, colestid, colace, sq lovenox   cont protonix, zoloft, lasix po 20mg qd . . started 4/20    cont neurontin, mucinex, synthroid, corgard   cont aldactone, zinc sulfate, nacl aerosols bid   cont ivfs ns 50cc/hr . . started 4/17    cont iv solumedrol 60mg q6h . . started 4/21       will need to check patient for home o2 needs prior to discharge    d/c plan: home w/ resumption of [facility], when medically stable

## 2020-04-22 NOTE — PROGRESS NOTES
Pt encouraged to use bipap. She is refusing at this time \"because it is uncomfortable. \" 6L HF 97%

## 2020-04-22 NOTE — PROGRESS NOTES
Feels better  vss  Ct draining serous fluid  Dry dressing  Cor rrr  Chest reduced bs  abdo benign  Continue current care

## 2020-04-22 NOTE — PROGRESS NOTES
is ascites within the right upper quadrant. Right-sided pleural effusion noted. Ascites within the right upper quadrant and right-sided pleural effusion. Cholelithiasis and gallbladder wall thickening measuring 5 mm. Clinical correlation is strongly recommended. This can be seen in setting of the hypoproteinemic state or volume overload amongst other etiologies. Cholecystitis may be considered differential if there are symptoms of there is right upper quadrant pain or discomfort. Cirrhotic appearance of the liver. Us Dup Lower Extremity Right Aureliano    Result Date: 4/14/2020  EXAMINATION: DUPLEX VENOUS ULTRASOUND OF THE RIGHT LOWER EXTREMITY, 4/14/2020 10:37 am TECHNIQUE: Duplex ultrasound and Doppler images were obtained of the right lower extremity. COMPARISON: None. HISTORY: Reason for exam:->evaluate DVT Pain FINDINGS: The visualized veins of the right lower extremity are patent and free of echogenic thrombus. The veins are normally compressible and have normal phasic flow. No evidence of DVT in the right lower extremity.              Objective:   Vitals: BP (!) 141/63   Pulse 64   Temp 97.6 °F (36.4 °C) (Oral)   Resp 18   Ht 5' 5\" (1.651 m)   Wt 244 lb 4 oz (110.8 kg)   SpO2 96%   BMI 40.65 kg/m²  O2 Flow Rate (L/min): 6 L/min  Ventilator Data  (if applicable):   VENT SETTINGS:   Vent Information  Skin Assessment: Clean, dry, & intact  FiO2 : 60 %  SpO2: 96 %  SpO2/FiO2 ratio: 140  Additional Respiratory  Assessments  Pulse: 64  Resp: 18  SpO2: 96 %  General appearance: alert, appears stated age and cooperative    Neck: no adenopathy and no JVD  Lungs: diminished breath sounds bilaterally chest tube in place  Heart: regular rate and rhythm, S1, S2 normal, no murmur, click, rub or gallop  Abdomen: soft, non-tender; bowel sounds normal; no masses,  no organomegaly  Extremities: extremities normal, atraumatic, no cyanosis or edema      Assessment:   Acute respiratory failure, increased oxygen demands yesterday, now some better  Likely element of micro/macro atelectasis  Recurrent right pleural effusion, status post thoracentesis in January and October  Status post VATS April 17, 2020  MELANIE Very likely  Morbid obesity/BMI 40  Severe deconditioning  Mild pulmonary hypertension  History of HERNANDEZ  History of DVT with IVC filter placement     Plan:   1. Continue BiPAP prn, pt encouraged to use as much as possible  2. Continue mucolytic medications  3. Solumedrol for several doses  4. Incentive spirometry  5. CXR reviewed, slight improvement.      Markell Gloria MD

## 2020-04-22 NOTE — PLAN OF CARE
Problem: Falls - Risk of:  Goal: Will remain free from falls  Description: Will remain free from falls  4/22/2020 0512 by Ines Angel RN  Outcome: Met This Shift     Problem: Falls - Risk of:  Goal: Absence of physical injury  Description: Absence of physical injury  4/22/2020 6876 by Ines Angel RN  Outcome: Met This Shift     Problem: Airway Clearance - Ineffective:  Goal: Clear lung sounds  Description: Clear lung sounds  4/22/2020 0512 by Ines Angel RN  Outcome: Met This Shift     Problem: Airway Clearance - Ineffective:  Goal: Ability to maintain a clear airway will improve  Description: Ability to maintain a clear airway will improve  4/22/2020 0512 by Ines Angel RN  Outcome: Met This Shift     Problem: Fluid Volume - Deficit:  Goal: Achieves intake and output within specified parameters  Description: Achieves intake and output within specified parameters  4/22/2020 0512 by Ines Angel RN  Outcome: Met This Shift     Problem: Gas Exchange - Impaired:  Goal: Levels of oxygenation will improve  Description: Levels of oxygenation will improve  4/22/2020 0512 by Ines Angel RN  Outcome: Met This Shift     Problem: Pain:  Goal: Pain level will decrease  Description: Pain level will decrease  4/22/2020 0512 by Ines Angel RN  Outcome: Met This Shift     Problem: Pain:  Goal: Control of acute pain  Description: Control of acute pain  4/22/2020 0512 by Ines Angel RN  Outcome: Met This Shift     Problem: Pain:  Goal: Control of chronic pain  Description: Control of chronic pain  4/22/2020 0512 by Ines Angel RN  Outcome: Met This Shift     Problem: Urinary Elimination:  Goal: Signs and symptoms of infection will decrease  Description: Signs and symptoms of infection will decrease  4/22/2020 0512 by Ines Angel RN  Outcome: Met This Shift     Problem: Urinary Elimination:  Goal: Ability to reestablish a normal

## 2020-04-22 NOTE — PROGRESS NOTES
Physical Therapy    Physical Therapy Treatment Note    Room #:  5465/4155-24  Patient Name: Monique Quan  YOB: 1945  MRN: [id number]    Referring Provider: Obed Pineda MD    Date of Service: 4/22/2020    Evaluating Physical Therapist: Windy Vilchis, PT  Lic.  # [id number]      Diagnosis: Rhabdomyolysis [M62.82]  Rhabdomyolysis [M62.82]        Patient Active Problem List   Diagnosis    Hypothyroidism    Hypertension    Type 2 diabetes mellitus with other specified complication ([address]    Gout    Upper GI bleed    Normocytic anemia    Obesity    Esophageal varices (HCC)    DVT (deep venous thrombosis) (HCC)    Calculus of gallbladder with acute on chronic cholecystitis without obstruction    Rhabdomyolysis    Red blood cell antibody positive    Iron deficiency anemia    Recurrent right pleural effusion    Acute respiratory failure with hypoxia (HCC)    Suspected COVID-19 virus infection    Pneumonia of left lung due to infectious organism    Pancytopenia ([facility] [address]    Hyperlipidemia        Tentative placement recommendation: Subacute vs Home Health Physical Therapy if patient meets goals    Equipment recommendation: Wheelchair, 22-24 inch, foot rest   Prior Level of Function: Patient ambulated with wheeled walker with assist short distance d/t right knee buckling  Rehab Potential: good for goals    Past medical history:   Past Medical History:   Diagnosis Date    Blood transfusion     Gout     Hx of blood clots     right knee    Hyperlipidemia     Hypertension     Hypothyroidism     Morbid obesity ([facility] [address]     Non-insulin dependent type 2 diabetes mellitus ([facility] [address]      Past Surgical History:   Procedure Laterality Date    ENDOSCOPY, COLON, DIAGNOSTIC      HERNIA REPAIR      umbillical    KNEE ARTHROSCOPY  11/8/2012    right knee arthroscopy    OTHER SURGICAL HISTORY N/A 4-13-15    push enteroscopy    OTHER SURGICAL HISTORY      edd filter      2 months    THORACOSCOPY Right Sit to supine: Minimal assist of 1    Scooting: Minimal assist of 1    Dangle edge of bed x 30 min independent    Transfers Sit to stand: Not assessed  pending left ankle xray Sit to stand: Not assessed   Sit to stand: Moderate assist of 1  If xray left ankle negative   Ambulation    not assessed   not assessed      25 feet using  wheeled walker with Minimal assist of 1  without knee buckling   ROM Within functional limits        Strength BUE: 3/5 shoulders and triceps; 3+ biceps  RLE:  3/5  LLE:  3/5   Increase strength in affected mm groups by 1/3 grade   Balance Sitting EOB:  fair    Dynamic Standing:  not assessed d/t pending left ankle xray Sitting EOB:  not assessed  Dynamic Standing:  not assessed Sitting EOB:  good    Dynamic Standing: fair plus     Patient is Alert & Oriented x person, place, time and situation and follows directions    Patient education  Patient was educated and facilitated on techniques to increase safety and independence with bed mobility, balance, functional transfers, and functional mobility. Patient was explained the the benefits of mobility and risks of immobility. Patient response to education:   Pt verbalized understanding Pt demonstrated skill Pt requires further education in this area    Yes Partial Yes      Treatment: Patient practiced and was instructed in the following treatment:      Patient instructed on supine exercises. Therapeutic Exercises: ankle pumps, quad sets, heel slide, hip abduction/adduction and straight leg raise, SAQ, x 10 reps. Verbal/tactile cues for increased participation. At end of session, patient was in bed with bariatric low air loss bed , call light and phone within reach, all lines were intact, and nursing was notified. ASSESSMENT:   Pt able to perform supine exercises with AAROM/AROM and no new c/o.  Pt stated she did not want to get out of bed this pm.    Patient would benefit from continued skilled Physical Therapy to improve functional independence and quality of life. PLAN:    Patient is making fair progress towards established goals, will continue with current plan of care. Time in: 2:25  Time out: 2:35    Total Treatment Time:  10 minutes    CPT codes:  Therapeutic exercises (40489)   10 minutes  1 unit(s)    Tripp Cap.  [facility]  LIC # [id number]

## 2020-04-22 NOTE — PROGRESS NOTES
PROGRESS NOTE    By Aimee Healy D.O GI Fellow    [facility]  Dr. London Francisco MD, Dr. Chalino Chen MD, Dr Nikita Osborne, Dr. Aziza Mcneil MD, Dr. Octavia Amaral, DO Augustine Moody  69 y.o.  female    SUBJECTIVE: Patient is resting comfortably. Patient denies any hematemesis or melena.       OBJECTIVE:    BP (!) 141/63   Pulse 64   Temp 97.6 °F (36.4 °C) (Oral)   Resp 18   Ht 5' 5\" (1.651 m)   Wt 244 lb 4 oz (110.8 kg)   SpO2 96%   BMI 40.65 kg/m²     Gen: NAD, AAO x 3  HEENT:PEERL, no icterus  Heart: RRR, no M/R/G  Lungs: CTAB, right-sided chest tube in place with drainage present  Abd.: soft, NT, ND, BS large pannus   Extr.: no C/C/E, no bruising      Stool (measured) : 0 mL  Lab Results   Component Value Date    WBC 5.2 04/20/2020    WBC 6.0 04/19/2020    WBC 5.2 04/18/2020    HGB 8.7 04/20/2020    HGB 8.6 04/19/2020    HGB 8.8 04/18/2020    HCT 30.4 04/20/2020    MCV 83.1 04/20/2020    RDW 18.4 04/20/2020    PLT 88 04/20/2020    PLT 94 04/19/2020    PLT 80 04/18/2020     Lab Results   Component Value Date     04/22/2020    K 4.6 04/22/2020     04/22/2020    CO2 22 04/22/2020    BUN 26 04/22/2020    CREATININE 0.9 04/22/2020    CALCIUM 9.2 04/22/2020    PROT 5.8 04/22/2020    LABALBU 2.0 04/22/2020    LABALBU 4.0 04/30/2012    BILITOT 0.6 04/22/2020    BILITOT 0.7 04/18/2020    BILITOT 0.6 04/17/2020    ALKPHOS 250 04/22/2020    ALKPHOS 213 04/18/2020    ALKPHOS 222 04/17/2020    AST 31 04/22/2020    AST 58 04/18/2020    AST 78 04/17/2020    ALT 16 04/22/2020    ALT 24 04/18/2020    ALT 26 04/17/2020     Lab Results   Component Value Date    LIPASE 84 03/29/2015    LIPASE 129 03/28/2015    LIPASE 91 04/26/2011     Lab Results   Component Value Date    AMYLASE 58 03/28/2015         ASSESSMENT/PLAN:    1. 1. 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

## 2020-04-23 NOTE — PROGRESS NOTES
Physical Therapy    Physical Therapy Treatment Note    Room #:  7003/2978-17  Patient Name: Kali Bangura  YOB: 1945  MRN: [id number]    Referring Provider: Tasneem Shin MD    Date of Service: 4/23/2020    Evaluating Physical Therapist: Ana Bran, PT  Lic.  # [id number]      Diagnosis: Rhabdomyolysis [M62.82]  Rhabdomyolysis [M62.82]        Patient Active Problem List   Diagnosis    Hypothyroidism    Hypertension    Type 2 diabetes mellitus with other specified complication ([facility] [address]    Gout    Upper GI bleed    Normocytic anemia    Obesity    Esophageal varices (HCC)    DVT (deep venous thrombosis) (HCC)    Calculus of gallbladder with acute on chronic cholecystitis without obstruction    Rhabdomyolysis    Red blood cell antibody positive    Iron deficiency anemia    Recurrent right pleural effusion    Acute respiratory failure with hypoxia (HCC)    Suspected COVID-19 virus infection    Pneumonia of left lung due to infectious organism    Pancytopenia ([facility] [address]    Hyperlipidemia        Tentative placement recommendation: Subacute vs Home Health Physical Therapy if patient meets goals    Equipment recommendation: Wheelchair, 22-24 inch, foot rest   Prior Level of Function: Patient ambulated with wheeled walker with assist short distance d/t right knee buckling  Rehab Potential: good for goals    Past medical history:   Past Medical History:   Diagnosis Date    Blood transfusion     Gout     Hx of blood clots     right knee    Hyperlipidemia     Hypertension     Hypothyroidism     Morbid obesity ([facility] [address]     Non-insulin dependent type 2 diabetes mellitus ([facility] [address]      Past Surgical History:   Procedure Laterality Date    ENDOSCOPY, COLON, DIAGNOSTIC      HERNIA REPAIR      umbillical    KNEE ARTHROSCOPY  11/8/2012    right knee arthroscopy    OTHER SURGICAL HISTORY N/A 4-13-15    push enteroscopy    OTHER SURGICAL HISTORY      edd filter      2 months    THORACOSCOPY Right 4/17/2020    FIBEROPTIC BRONCHOSCOPY, RIGHT VIDEO ASSISTED THORACOSCOPY DECORTACATION AND PLEURIDESIS performed by Idalmis Robledo MD at [place] [address]       Precautions: falls and O2, 2 Liters of o2 via nasal cannula, low air loss bed, chest tube    SUBJECTIVE:    Social history: Patient lives with spouse in a ranch home with [place]amp  to enter Walk in shower grab bars    Equipment owned: Rollator, Quad cane, shower bench, raised toilet seat    AM-PAC Basic Mobility    AM-PAC Mobility Inpatient   How much difficulty turning over in bed?: A Lot  How much difficulty sitting down on / standing up from a chair with arms?: A Lot  How much difficulty moving from lying on back to sitting on side of bed?: A Lot  How much help from another person moving to and from a bed to a chair?: A Lot  How much help from another person needed to walk in hospital room?: A Lot  How much help from another person for climbing 3-5 steps with a railing?: Total  AM-PAC Inpatient Mobility Raw Score : 11  AM-PAC Inpatient T-Scale Score : 33.86  Mobility Inpatient CMS 0-100% Score: 72.57  Mobility Inpatient CMS G-Code Modifier : CL    Nursing cleared patient for PT treatment. Patient required encouragement to participate. Patient complained of left ankle pain. OBJECTIVE:   Initial Evaluation  Date: 4/15/20 Treatment Date:  4/23/2020   Short Term/ Long Term   Goals   Was pt agreeable to Eval/treatment? Yes   Yes To be met in 3 days   Pain level   0/10    Pt c/o overall pain with no number assigned.      Bed Mobility  Rolling: Maximal assist of 1    Supine to sit: Maximal assist of 1    Sit to supine: Maximal assist of  2  Pt had episode of panic upon laying down and required max a of 2 for safety d/t sliding anteriorly  Scooting: Maximal assist of 1  Rolling: Not assessed    Supine to sit: Not assessed    Sit to supine: Not assessed    Scooting: Not assessed   Rolling: Minimal assist of 1    Supine to sit: Minimal assist of 1 Sit to supine: Minimal assist of 1    Scooting: Minimal assist of 1    Dangle edge of bed x 30 min independent    Transfers Sit to stand: Not assessed  pending left ankle xray Sit to stand: Not assessed   Sit to stand: Moderate assist of 1  If xray left ankle negative   Ambulation    not assessed   not assessed      25 feet using  wheeled walker with Minimal assist of 1  without knee buckling   ROM Within functional limits        Strength BUE: 3/5 shoulders and triceps; 3+ biceps  RLE:  3/5  LLE:  3/5   Increase strength in affected mm groups by 1/3 grade   Balance Sitting EOB:  fair    Dynamic Standing:  not assessed d/t pending left ankle xray Sitting EOB:  not assessed  Dynamic Standing:  not assessed Sitting EOB:  good    Dynamic Standing: fair plus     Patient is Alert & Oriented x person, place, time and situation and follows directions    Patient education  Patient was educated and facilitated on techniques to increase safety and independence with bed mobility, balance, functional transfers, and functional mobility. Patient was explained the the benefits of mobility and risks of immobility. Patient response to education:   Pt verbalized understanding Pt demonstrated skill Pt requires further education in this area    Yes Partial Yes      Treatment: Patient practiced and was instructed in the following treatment:      Patient instructed on supine exercises. Therapeutic Exercises: ankle pumps, quad sets, heel slide, hip abduction/adduction and straight leg raise, SAQ, x 10 reps. Verbal/tactile cues for increased participation. At end of session, patient was in bed with bariatric low air loss bed , call light and phone within reach, all lines were intact, and nursing was notified. ASSESSMENT:   Pt able to perform supine exercises with AAROM/AROM and no new c/o.  Pt stated she did not want to get out of bed this am.    Patient would benefit from continued skilled Physical Therapy to improve functional independence and quality of life. PLAN:    Patient is making fair progress towards established goals, will continue with current plan of care. Time in: 10:28  Time out: 10:40    Total Treatment Time:  12 minutes    CPT codes:  Therapeutic exercises (47300)   12 minutes  1 unit(s)    Devika An  [facility]  LIC # [id number]

## 2020-04-23 NOTE — PROGRESS NOTES
PROGRESS NOTE    By Cresencio Alston D.O GI Fellow    [facility]  Dr. Michael Santos MD, Dr. Lee Ann Meeks MD, Dr Lola Robles, Dr. Lou Pop MD, Dr. Bebo Guerrero, DO Patricia Gotti  69 y.o.  female    SUBJECTIVE: Patient is resting comfortably. Patient denies any hematemesis or melena.       OBJECTIVE:    /62   Pulse 58   Temp 97.9 °F (36.6 °C)   Resp 16   Ht 5' 5\" (1.651 m)   Wt 244 lb 4 oz (110.8 kg)   SpO2 100%   BMI 40.65 kg/m²     Gen: NAD, AAO x 3  HEENT:PEERL, no icterus  Heart: RRR, no M/R/G  Lungs: CTAB, right-sided chest tube in place with drainage present  Abd.: soft, NT, ND, BS large pannus   Extr.: no C/C/E, no bruising      Stool (measured) : 0 mL  Lab Results   Component Value Date    WBC 3.4 04/23/2020    WBC 5.2 04/20/2020    WBC 6.0 04/19/2020    HGB 8.6 04/23/2020    HGB 8.7 04/20/2020    HGB 8.6 04/19/2020    HCT 28.6 04/23/2020    MCV 80.3 04/23/2020    RDW 18.6 04/23/2020    PLT 87 04/23/2020    PLT 88 04/20/2020    PLT 94 04/19/2020     Lab Results   Component Value Date     04/23/2020    K 4.4 04/23/2020     04/23/2020    CO2 21 04/23/2020    BUN 37 04/23/2020    CREATININE 0.9 04/23/2020    CALCIUM 9.0 04/23/2020    PROT 5.8 04/22/2020    LABALBU 2.0 04/22/2020    LABALBU 4.0 04/30/2012    BILITOT 0.6 04/22/2020    BILITOT 0.7 04/18/2020    BILITOT 0.6 04/17/2020    ALKPHOS 250 04/22/2020    ALKPHOS 213 04/18/2020    ALKPHOS 222 04/17/2020    AST 31 04/22/2020    AST 58 04/18/2020    AST 78 04/17/2020    ALT 16 04/22/2020    ALT 24 04/18/2020    ALT 26 04/17/2020     Lab Results   Component Value Date    LIPASE 84 03/29/2015    LIPASE 129 03/28/2015    LIPASE 91 04/26/2011     Lab Results   Component Value Date    AMYLASE 58 03/28/2015         ASSESSMENT/PLAN:    1. 1. 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 Recurrent Right Sided Pleural Effusion   - Pt with CTA chest in September 2019 and repeat on April 2020 patient with recurrent right-sided pleural effusion  - Concern for hepatic hydrothorax  - Recent Echo EF 71%   -Abdominal US with RUQ ascites not amenable to a paracentetics   -  Cr 0.9 pt tolerating Lasix 20mg and Aldactone 50mg   - Chest x ray shows improvement in the right pleural effusion   - Chest tube drainage is decreasing after bedside pleuraedesis   - Place pt on 2gm NA diet         Pt was discussed with Dr. Rubia Yi DO  GI Fellow   4/23/2020  9:03 AM    Pt seen and independently examined. Pertinent notes and lab work reviewed. D/w Dr. Pepe Mccain with physical exam and A&P. Discussed with patient - all questions answered - agreeable with the plan as delineated.       Dary Richards MD  4/23/2020  10:52 AM

## 2020-04-23 NOTE — PROGRESS NOTES
are provided for review. Dose modulation, iterative reconstruction, and/or weight based adjustment of the mA/kV was utilized to reduce the radiation dose to as low as reasonably achievable. COMPARISON: None. HISTORY ORDERING SYSTEM PROVIDED HISTORY: questionable nondisplaced fractures on Xray, recent fall TECHNOLOGIST PROVIDED HISTORY: Reason for exam:->questionable nondisplaced fractures on Xray, recent fall FINDINGS: Osteopenia. No acute fracture. Mild irregularity involving the distal fibula and medial malleolus with tiny well corticated bony fragments distal to the medial malleolus. Findings likely related to previous trauma. No joint effusion. Calcaneal spurs. Vascular calcifications. Cystic change at the cuboid navicular joint. Small spurring at the cuboid navicular joint. Mild soft tissue edema. No fracture. Cta Chest W Contrast    Result Date: 4/14/2020  EXAMINATION: CTA OF THE CHEST 4/14/2020 10:19 am TECHNIQUE: CTA of the chest was performed after the administration of intravenous contrast.  Multiplanar reformatted images are provided for review. MIP images are provided for review. Dose modulation, iterative reconstruction, and/or weight based adjustment of the mA/kV was utilized to reduce the radiation dose to as low as reasonably achievable. COMPARISON: 01/20/2020, 09/29/2019 HISTORY: ORDERING SYSTEM PROVIDED HISTORY: DVT; no anticoagulation; CP and SOB TECHNOLOGIST PROVIDED HISTORY: Reason for exam:->DVT; no anticoagulation; CP and SOB FINDINGS: Pulmonary Arteries: Pulmonary arteries are adequately opacified for evaluation. No evidence of intraluminal filling defect to suggest pulmonary embolism. Main pulmonary artery is normal in caliber. Mediastinum: Aortic and coronary atherosclerosis. The thyroid is within normal limits. No pericardial effusion. The esophagus is within normal limits. No mediastinal adenopathy. Lungs/pleura: Moderate to large right pleural effusion.   Trace left pleural effusion. Compressive atelectasis at the lung bases, without definite consolidation. Focal patchy ground-glass opacity in the left upper lobe as well as smaller, more peripheral ground-glass opacities in the left upper lobe. The central airways are grossly patent. Upper Abdomen: Splenomegaly. Small amount of ascites. The liver is mildly cirrhotic in appearance. Soft Tissues/Bones: No acute bone or soft tissue abnormality. 1. No evidence of pulmonary embolism. 2. New large patchy ground-glass opacity in the left upper lobe as well as smaller, more peripheral ground-glass opacities in the lingula. The differential favors an infectious process including viral and atypical pneumonia. 3. Moderate to large right pleural effusion. 4. Cirrhotic appearance of the liver with splenomegaly and a small amount of ascites. Us Abdomen Limited    Result Date: 4/15/2020  EXAMINATION: RIGHT UPPER QUADRANT ULTRASOUND 4/15/2020 4:44 pm COMPARISON: 08/13/2018 HISTORY: ORDERING SYSTEM PROVIDED HISTORY: RUQ:  rule out hepatic mass and abdominal for ascites  PROVIDED HISTORY: Reason for exam:->RUQ:  rule out hepatic mass and abdominal for ascites survery Reason for exam:->Abdominal US for ascites survery FINDINGS: LIVER:  There is diffusely increased abnormal echogenicity throughout the liver suggestive of hepatocellular disease. As result there is decreased through transmission of sound which decreases sensitivity for underlying parenchymal mass. BILIARY SYSTEM:  Multiple stones identified within the gallbladder. Sonographic [place] sign is negative. Common bile duct measures 5 mm. Gallbladder wall thickening measuring 5 mm Common bile duct is within normal limits measuring 5 mm RIGHT KIDNEY: The right kidney is grossly unremarkable without evidence of hydronephrosis. PANCREAS:  Visualized portions of the pancreas are unremarkable. OTHER: There is ascites within the right upper quadrant.

## 2020-04-23 NOTE — CARE COORDINATION
SOCIAL WORK / DISCHARGE PLANNING:  COVID negative. Pt continues with chest tube, 4L O2. Pt initially had been agreeable to [facility] but then declined. Family state they can manage pt care with [facility] services, active with [facility], will need MICHELLE orders at discharge. DIL has requested wheelchair, will need script, choses [facility] same dme if O2 is needed at discharge.                  Electronically signed by RADHA Barlow on 4/23/2020 at 3:18 PM

## 2020-04-24 NOTE — PROGRESS NOTES
Department of Internal Medicine            HISTORY OF PRESENT ILLNESS:      The patient is a 69 y.o. female who presents with chronic right thigh pain is been present for about 5 months with increased weakness and falling. Patient was having increased shortness of breath when laying down. Patient also has chronic bilateral lower extremity edema. Patient initially denied any fever and chills or cough. CT chest done in the ED showed moderate to large right pleural effusion with a large patchy groundglass opacity in left upper lobe as well is a smaller peripheral groundglass opacity in lingula. The patient initially was worked up for [place] [id number] and was given to the [facility] hospitalist group. Patient was consult with cardiology, pulmonary, GI and vascular surgery for a VATS procedure. Patient was cleared by cardiology and GI and pulmonary and was set up for surgery today. Patient currently denies any chest pain or abdominal pain. Patient's temperature is been normal throughout hospitalization with current heart rate of 68 and sinus rhythm respiratory rate of 20 and blood pressure currently 148/67. O2 saturation 96% on 1 L nasal cannula. Patient blood sugars range 107- 130 off of her Glucophage. WBC is 2.3 with hemoglobin 7.1. Platelet count is 80. Patient was ordered 2 units of packed RBCs to be transfused this morning. 4/18/2020  Patient was seen and examined on telemetry floor. Case discussed with Dr. Tricia Poe today. Patient doing very well and has postop discomfort. Blood sugars ranging from 116-165. Hemoglobin is 8.8 with a WBC of 5.2 and platelet count 88. Temperature 97.3 with a heart rate of 70. Blood pressure currently 145/63 with O2 sat in 99%. Urine output is fairly good. 4/19/2020  Patient was seen and examined on telemetry floor. Patient very sleepy today. She complains of spasm around surgical site but otherwise she feels fairly good. Temperature 97.8 with a heart rate of 68.   Respiratory rate 18 with blood pressure 129/62. Patient currently on 4 L nasal cannula with O2 sat range %. Serum creatinine went up to 1.2 today with a BUN of 23. Blood sugars ranging 132-159. Hemoglobin is 8.6 and WBC is 6.0 and a platelet count 94. .    4/20/2020  Patient was seen and examined on telemetry floor. Temperature 97.6 with a heart rate of 71. Patient is sleepy again today. The patient has postop discomfort. Patient denies any abdominal pain, nausea or vomiting. Patient has a moist nonproductive cough off and on. Blood pressure currently 140/65. O2 sat ranged from 91-95% on 4 L nasal cannula. Urine output 125- 200 cc a shift. Oral intake is poor. BUN/creatinine is 22/1.1. Blood sugars ranging from 145-231. REN globin is 8.7 with a WBC of 5.2.    4/21/2020  Patient was seen and examined on telemetry floor. Patient has postop discomfort. Patient O2 nasal cannula has been increased over the past 24 hours. She denies any abdominal pain or nausea. Blood sugars range 143-182. Temperature is 97.5 with a heart rate is 70 and respiratory rate 20. Blood pressure 132/60. O2 sats 99% on 7 L nasal cannula. Oral intake is poor with patient is urine output ranging 150-275 cc a shift. 4/22/2020  Patient was seen and examined on telemetry floor. Patient has expected postop discomfort. She denies any other chest discomfort, abdominal pain. Patient has a weak nonproductive cough more prominent recently. Blood sugars range from 143-201. BUN/creatinine 26/0.9. Serum albumin is 2.0. Blood pressure ranges 128//63. Temperature is 97.6 with a heart rate of 64. Patient currently on 6 L nasal cannula with O2 sat 96%. Urine output is good. 4/23/2020  Patient was seen and examined on [facility]. Patient looks very very tired and weak but states that she is doing better. She denies any current left-sided chest pain or abdominal pain or nausea vomiting.   Temperatures 97.9 with a heart rate of 60

## 2020-04-24 NOTE — PROGRESS NOTES
etiologies   - MELD-NA 7  <2% 90 day mortality   - Hgb at baseline, no GI bleed on exam   - Most recent EGD in the office  - RUQ negative for mass AFP 3     - Continue with  Laculose 10g BID to pts regimen          2. Recurrent Right Sided Pleural Effusion   - Pt with CTA chest in September 2019 and repeat on April 2020 patient with recurrent right-sided pleural effusion  - Concern for hepatic hydrothorax  - Recent Echo EF 71%   -Abdominal US with RUQ ascites not amenable to a paracentetics   -  Cr 0.9 pt tolerating Lasix 20mg and Aldactone 50mg   - Chest x ray shows improvement in the right pleural effusion   - Chest tube drainage is decreasing after bedside pleuraedesis   - Place pt on 2gm NA diet      Lab work this morning pending. Pt was discussed with  Dr. Hai Smith DO  GI Fellow   4/24/2020  9:55 AM    Pt seen and independently examined. Pertinent notes and lab work reviewed. D/w Dr. Kayla Junior. Agree with physical exam and A&P. Discussed with patient - all questions answered - agreeable with the plan as delineated.       Theodore Nicholson MD  4/24/2020  10:09 AM

## 2020-04-24 NOTE — PROGRESS NOTES
Pt stated she feels like there is a bar she is laying on and would like \"it to go away. \" Patient pulled up and adjusted in bed but refused to turn on her side, explained that it will make her feel better and that she has been laying the same position for hours. Patient still refused and asked if she could go to sleep. I asked if she would wear the bipap while she took a nap. She addimetly refused until I mentioned her daughter Lisa Stephens and her concern that the patient she is confused at times and that wearing the bipap may help if she is retaining CO2. Patient stated she would wear it for one hour.

## 2020-04-24 NOTE — PROGRESS NOTES
[address] [place] [address] [facility]  Progress Note    NAME:Fátima Jurado Degree  1945  DATE:04/24/20    FACE TO FACE ENCOUNTER FOR : pneumonia    SUBJECTIVE:  Chief Complaint   Patient presents with    Fatigue     to er via ems from home for complaint of being so weak she can not get out of her chair. this has been ongoing x 1 month. when she gets up, she is falling frequently and hurt her right thigh and left ankle.  Shortness of Breath     x 1 month. also has \"chest discomfort\". in [address]  bed   Flat affect  Asking about d/c to home      Recurrent right pleural effusion. PROCEDURES PERFORMED:  Bronchoscopy, right video-assisted thoracic surgery. Drainage of pleural effusion, talc pleurodesis. temp better     Review of systems:  As stated above in the chief complaint, otherwise negative.     Medications:  Scheduled Meds:   guaiFENesin  1,200 mg Oral BID    sodium chloride (Inhalant)  4 mL Nebulization BID    levoFLOXacin  750 mg Oral Daily    furosemide  20 mg Oral Daily    spironolactone  50 mg Oral Daily    sodium chloride  20 mL Intravenous Once    docusate sodium  100 mg Oral BID    midazolam  1 mg Intravenous Once    lactulose  10 g Oral BID    enoxaparin  40 mg Subcutaneous Daily    sertraline  100 mg Oral Daily    allopurinol  100 mg Oral Daily    nadolol  10 mg Oral Daily    pantoprazole  40 mg Oral BID    gabapentin  100 mg Oral TID    traZODone  100 mg Oral Nightly    cholestyramine light  4 g Oral Daily    levothyroxine  50 mcg Oral QAM AC    zinc sulfate  50 mg Oral Daily    insulin lispro  0-6 Units Subcutaneous TID WC    insulin lispro  0-3 Units Subcutaneous Nightly     Continuous Infusions:   sodium chloride 50 mL/hr at 04/23/20 2220    dextrose       PRN Meds:ondansetron, HYDROcodone 5 mg - acetaminophen, sennosides-docusate sodium, acetaminophen **OR** acetaminophen, glucose, dextrose, glucagon (rDNA), dextrose    OBJECTIVE:  BP (!)

## 2020-04-24 NOTE — PROGRESS NOTES
Physical Therapy    Physical Therapy Treatment Note    Room #:  2591/8614-05  Patient Name: Skylar Cardona  YOB: 1945  MRN: [id number]    Referring Provider: Ryann Hudson MD    Date of Service: 4/24/2020    Evaluating Physical Therapist: Triston Silvestre PT  Lic.  # [id number]      Diagnosis: Rhabdomyolysis [M62.82]  Rhabdomyolysis [M62.82]        Patient Active Problem List   Diagnosis    Hypothyroidism    Hypertension    Type 2 diabetes mellitus with other specified complication ([facility]    Gout    Upper GI bleed    Normocytic anemia    Obesity    Esophageal varices (HCC)    DVT (deep venous thrombosis) (HCC)    Calculus of gallbladder with acute on chronic cholecystitis without obstruction    Rhabdomyolysis    Red blood cell antibody positive    Iron deficiency anemia    Recurrent right pleural effusion    Acute respiratory failure with hypoxia (HCC)    Suspected COVID-19 virus infection    Pneumonia of left lung due to infectious organism    Pancytopenia ([facility]    Hyperlipidemia        Tentative placement recommendation: Subacute vs Home Health Physical Therapy if patient meets goals    Equipment recommendation: Wheelchair, 22-24 inch, foot rest   Prior Level of Function: Patient ambulated with wheeled walker with assist short distance d/t right knee buckling  Rehab Potential: good for goals    Past medical history:   Past Medical History:   Diagnosis Date    Blood transfusion     Gout     Hx of blood clots     right knee    Hyperlipidemia     Hypertension     Hypothyroidism     Morbid obesity ([facility]     Non-insulin dependent type 2 diabetes mellitus ([facility]      Past Surgical History:   Procedure Laterality Date    ENDOSCOPY, COLON, DIAGNOSTIC      HERNIA REPAIR      umbillical    KNEE ARTHROSCOPY  11/8/2012    right knee arthroscopy    OTHER SURGICAL HISTORY N/A 4-13-15    push enteroscopy    OTHER SURGICAL HISTORY      edd filter      2 months    THORACOSCOPY Right

## 2020-04-24 NOTE — PROGRESS NOTES
Multiplanar reformatted images are provided for review. Dose modulation, iterative reconstruction, and/or weight based adjustment of the mA/kV was utilized to reduce the radiation dose to as low as reasonably achievable. COMPARISON: None. HISTORY ORDERING SYSTEM PROVIDED HISTORY: questionable nondisplaced fractures on Xray, recent fall TECHNOLOGIST PROVIDED HISTORY: Reason for exam:->questionable nondisplaced fractures on Xray, recent fall FINDINGS: Osteopenia. No acute fracture. Mild irregularity involving the distal fibula and medial malleolus with tiny well corticated bony fragments distal to the medial malleolus. Findings likely related to previous trauma. No joint effusion. Calcaneal spurs. Vascular calcifications. Cystic change at the cuboid navicular joint. Small spurring at the cuboid navicular joint. Mild soft tissue edema. No fracture. Cta Chest W Contrast    Result Date: 4/14/2020  EXAMINATION: CTA OF THE CHEST 4/14/2020 10:19 am TECHNIQUE: CTA of the chest was performed after the administration of intravenous contrast.  Multiplanar reformatted images are provided for review. MIP images are provided for review. Dose modulation, iterative reconstruction, and/or weight based adjustment of the mA/kV was utilized to reduce the radiation dose to as low as reasonably achievable. COMPARISON: 01/20/2020, 09/29/2019 HISTORY: ORDERING SYSTEM PROVIDED HISTORY: DVT; no anticoagulation; CP and SOB TECHNOLOGIST PROVIDED HISTORY: Reason for exam:->DVT; no anticoagulation; CP and SOB FINDINGS: Pulmonary Arteries: Pulmonary arteries are adequately opacified for evaluation. No evidence of intraluminal filling defect to suggest pulmonary embolism. Main pulmonary artery is normal in caliber. Mediastinum: Aortic and coronary atherosclerosis. The thyroid is within normal limits. No pericardial effusion. The esophagus is within normal limits. No mediastinal adenopathy. Lungs/pleura:  Moderate to large right pleural effusion. Trace left pleural effusion. Compressive atelectasis at the lung bases, without definite consolidation. Focal patchy ground-glass opacity in the left upper lobe as well as smaller, more peripheral ground-glass opacities in the left upper lobe. The central airways are grossly patent. Upper Abdomen: Splenomegaly. Small amount of ascites. The liver is mildly cirrhotic in appearance. Soft Tissues/Bones: No acute bone or soft tissue abnormality. 1. No evidence of pulmonary embolism. 2. New large patchy ground-glass opacity in the left upper lobe as well as smaller, more peripheral ground-glass opacities in the lingula. The differential favors an infectious process including viral and atypical pneumonia. 3. Moderate to large right pleural effusion. 4. Cirrhotic appearance of the liver with splenomegaly and a small amount of ascites. Us Abdomen Limited    Result Date: 4/15/2020  EXAMINATION: RIGHT UPPER QUADRANT ULTRASOUND 4/15/2020 4:44 pm COMPARISON: 08/13/2018 HISTORY: ORDERING SYSTEM PROVIDED HISTORY: RUQ:  rule out hepatic mass and abdominal for ascites  PROVIDED HISTORY: Reason for exam:->RUQ:  rule out hepatic mass and abdominal for ascites survery Reason for exam:->Abdominal US for ascites survery FINDINGS: LIVER:  There is diffusely increased abnormal echogenicity throughout the liver suggestive of hepatocellular disease. As result there is decreased through transmission of sound which decreases sensitivity for underlying parenchymal mass. BILIARY SYSTEM:  Multiple stones identified within the gallbladder. Sonographic [place] sign is negative. Common bile duct measures 5 mm. Gallbladder wall thickening measuring 5 mm Common bile duct is within normal limits measuring 5 mm RIGHT KIDNEY: The right kidney is grossly unremarkable without evidence of hydronephrosis. PANCREAS:  Visualized portions of the pancreas are unremarkable.  OTHER: There is ascites within the

## 2020-04-25 NOTE — PLAN OF CARE
Problem: Falls - Risk of:  Goal: Will remain free from falls  Description: Will remain free from falls  Outcome: Met This Shift     Problem: Falls - Risk of:  Goal: Absence of physical injury  Description: Absence of physical injury  Outcome: Met This Shift     Problem: Airway Clearance - Ineffective:  Goal: Ability to maintain a clear airway will improve  Description: Ability to maintain a clear airway will improve  Outcome: Met This Shift

## 2020-04-25 NOTE — PROGRESS NOTES
Pulse 67   Temp 96.4 °F (35.8 °C) (Oral)   Resp 18   Ht 5' 5\" (1.651 m)   Wt 244 lb 4 oz (110.8 kg)   SpO2 98%   BMI 40.65 kg/m²   Temp  Av.4 °F (36.3 °C)  Min: 96.4 °F (35.8 °C)  Max: 97.9 °F (36.6 °C)  Constitutional:  The patient is awake, alert, and oriented. pale  Skin:      No rashes were noted. HEENT:    AT/NC  Chest:    On o2 dec bs ant  CT right serosang  Cardiovascular:  S1 and S2 are rhythmic and regular. Abdomen:   Positive bowel sounds to auscultation. Benign to palpation soft  Extremities:   No clubbing, no cyanosis,  edema. CNS    AAxO  Psych flat    Lines:  piv    Radiology:   Patchy airspace opacities bilaterally, may be related to pulmonary edema  versus pneumonia, improved the right lung apex, mildly increased at the right  lung base. Minimal bilateral pleural effusions. Stable right-sided chest tube.  No definite pneumothorax. Stable mild cardiomegaly.     Laboratory and Tests Review:  Lab Results   Component Value Date    WBC 3.0 (L) 2020    WBC 3.4 (L) 2020    WBC 5.2 2020    HGB 8.5 (L) 2020    HCT 28.7 (L) 2020    MCV 80.2 2020    PLT 74 (L) 2020     No results found for: [facility]  Lab Results   Component Value Date    ALT 23 2020    AST 48 (H) 2020    ALKPHOS 298 (H) 2020    BILITOT 0.5 2020     Lab Results   Component Value Date     2020    K 4.7 2020     2020    CO2 22 2020    BUN 38 2020    CREATININE 1.0 2020    CREATININE 0.9 2020    CREATININE 0.9 2020    GFRAA >60 2020    LABGLOM 54 2020    GLUCOSE 139 2020    GLUCOSE 237 2012    PROT 5.3 2020    LABALBU 2.1 2020    LABALBU 4.0 2012    CALCIUM 8.4 2020    BILITOT 0.5 2020    ALKPHOS 298 2020    AST 48 2020    ALT 23 2020     Lab Results   Component Value Date    CRP 1.4 (H) 2020     Lab Results   Component Value

## 2020-04-25 NOTE — PROGRESS NOTES
Pulmonary (Dr. Darryn Dixon)    4/25/2020 8:31 AM  Subjective:   Admit Date: 4/14/2020  PCP: Shanna Wallace,   Interval History:  Medications, data and notes reviewed. Events of night noted, had desaturation yesterday afternoon, required BiPAP support. She refused BiPAP al night. No desaturation on 2lpm nasal cannula per the RN She agrees that she will need to have some rehab after hospitalization but she refuses, wants to go home with home health care    Diet: DIET CARB CONTROL;   Dietary Nutrition Supplements: Wound Healing Oral Supplement  Dietary Nutrition Supplements: Diabetic Oral Supplement        Data:   Scheduled Meds:   guaiFENesin  1,200 mg Oral BID    sodium chloride (Inhalant)  4 mL Nebulization BID    levoFLOXacin  750 mg Oral Daily    furosemide  20 mg Oral Daily    spironolactone  50 mg Oral Daily    sodium chloride  20 mL Intravenous Once    docusate sodium  100 mg Oral BID    midazolam  1 mg Intravenous Once    lactulose  10 g Oral BID    enoxaparin  40 mg Subcutaneous Daily    sertraline  100 mg Oral Daily    allopurinol  100 mg Oral Daily    nadolol  10 mg Oral Daily    pantoprazole  40 mg Oral BID    gabapentin  100 mg Oral TID    traZODone  100 mg Oral Nightly    cholestyramine light  4 g Oral Daily    levothyroxine  50 mcg Oral QAM AC    zinc sulfate  50 mg Oral Daily    insulin lispro  0-6 Units Subcutaneous TID WC    insulin lispro  0-3 Units Subcutaneous Nightly     Continuous Infusions:   sodium chloride 50 mL/hr at 04/24/20 1621    dextrose       PRN Meds:ondansetron, HYDROcodone 5 mg - acetaminophen, sennosides-docusate sodium, acetaminophen **OR** acetaminophen, glucose, dextrose, glucagon (rDNA), dextrose      Intake/Output Summary (Last 24 hours) at 4/25/2020 0831  Last data filed at 4/25/2020 0645  Gross per 24 hour   Intake 1677.94 ml   Output 1992 ml   Net -314.06 ml       CBC:   Recent Labs     04/23/20  0508 04/25/20  0722   WBC 3.4* 3.0*   HGB 8.6* 8.5*   PLT CT of the left ankle was performed without the administration of intravenous contrast.  Multiplanar reformatted images are provided for review. Dose modulation, iterative reconstruction, and/or weight based adjustment of the mA/kV was utilized to reduce the radiation dose to as low as reasonably achievable. COMPARISON: None. HISTORY ORDERING SYSTEM PROVIDED HISTORY: questionable nondisplaced fractures on Xray, recent fall TECHNOLOGIST PROVIDED HISTORY: Reason for exam:->questionable nondisplaced fractures on Xray, recent fall FINDINGS: Osteopenia. No acute fracture. Mild irregularity involving the distal fibula and medial malleolus with tiny well corticated bony fragments distal to the medial malleolus. Findings likely related to previous trauma. No joint effusion. Calcaneal spurs. Vascular calcifications. Cystic change at the cuboid navicular joint. Small spurring at the cuboid navicular joint. Mild soft tissue edema. No fracture. Cta Chest W Contrast    Result Date: 4/14/2020  EXAMINATION: CTA OF THE CHEST 4/14/2020 10:19 am TECHNIQUE: CTA of the chest was performed after the administration of intravenous contrast.  Multiplanar reformatted images are provided for review. MIP images are provided for review. Dose modulation, iterative reconstruction, and/or weight based adjustment of the mA/kV was utilized to reduce the radiation dose to as low as reasonably achievable. COMPARISON: 01/20/2020, 09/29/2019 HISTORY: ORDERING SYSTEM PROVIDED HISTORY: DVT; no anticoagulation; CP and SOB TECHNOLOGIST PROVIDED HISTORY: Reason for exam:->DVT; no anticoagulation; CP and SOB FINDINGS: Pulmonary Arteries: Pulmonary arteries are adequately opacified for evaluation. No evidence of intraluminal filling defect to suggest pulmonary embolism. Main pulmonary artery is normal in caliber. Mediastinum: Aortic and coronary atherosclerosis. The thyroid is within normal limits. No pericardial effusion.   The esophagus is within normal limits. No mediastinal adenopathy. Lungs/pleura: Moderate to large right pleural effusion. Trace left pleural effusion. Compressive atelectasis at the lung bases, without definite consolidation. Focal patchy ground-glass opacity in the left upper lobe as well as smaller, more peripheral ground-glass opacities in the left upper lobe. The central airways are grossly patent. Upper Abdomen: Splenomegaly. Small amount of ascites. The liver is mildly cirrhotic in appearance. Soft Tissues/Bones: No acute bone or soft tissue abnormality. 1. No evidence of pulmonary embolism. 2. New large patchy ground-glass opacity in the left upper lobe as well as smaller, more peripheral ground-glass opacities in the lingula. The differential favors an infectious process including viral and atypical pneumonia. 3. Moderate to large right pleural effusion. 4. Cirrhotic appearance of the liver with splenomegaly and a small amount of ascites. Us Abdomen Limited    Result Date: 4/15/2020  EXAMINATION: RIGHT UPPER QUADRANT ULTRASOUND 4/15/2020 4:44 pm COMPARISON: 08/13/2018 HISTORY: ORDERING SYSTEM PROVIDED HISTORY: RUQ:  rule out hepatic mass and abdominal for ascites  PROVIDED HISTORY: Reason for exam:->RUQ:  rule out hepatic mass and abdominal for ascites survery Reason for exam:->Abdominal US for ascites survery FINDINGS: LIVER:  There is diffusely increased abnormal echogenicity throughout the liver suggestive of hepatocellular disease. As result there is decreased through transmission of sound which decreases sensitivity for underlying parenchymal mass. BILIARY SYSTEM:  Multiple stones identified within the gallbladder. Sonographic Marcello Hailstone sign is negative. Common bile duct measures 5 mm. Gallbladder wall thickening measuring 5 mm Common bile duct is within normal limits measuring 5 mm RIGHT KIDNEY: The right kidney is grossly unremarkable without evidence of hydronephrosis.  PANCREAS: Visualized portions of the pancreas are unremarkable. OTHER: There is ascites within the right upper quadrant. Right-sided pleural effusion noted. Ascites within the right upper quadrant and right-sided pleural effusion. Cholelithiasis and gallbladder wall thickening measuring 5 mm. Clinical correlation is strongly recommended. This can be seen in setting of the hypoproteinemic state or volume overload amongst other etiologies. Cholecystitis may be considered differential if there are symptoms of there is right upper quadrant pain or discomfort. Cirrhotic appearance of the liver. Us Dup Lower Extremity Right Aureliano    Result Date: 4/14/2020  EXAMINATION: DUPLEX VENOUS ULTRASOUND OF THE RIGHT LOWER EXTREMITY, 4/14/2020 10:37 am TECHNIQUE: Duplex ultrasound and Doppler images were obtained of the right lower extremity. COMPARISON: None. HISTORY: Reason for exam:->evaluate DVT Pain FINDINGS: The visualized veins of the right lower extremity are patent and free of echogenic thrombus. The veins are normally compressible and have normal phasic flow. No evidence of DVT in the right lower extremity.              Objective:   Vitals: /60   Pulse 67   Temp 96.4 °F (35.8 °C) (Oral)   Resp 18   Ht 5' 5\" (1.651 m)   Wt 244 lb 4 oz (110.8 kg)   SpO2 98%   BMI 40.65 kg/m²  O2 Flow Rate (L/min): 2 L/min  Ventilator Data  (if applicable):   VENT SETTINGS:   Vent Information  Skin Assessment: Clean, dry, & intact  FiO2 : 60 %  SpO2: 98 %  SpO2/FiO2 ratio: 140  Additional Respiratory  Assessments  Pulse: 67  Resp: 18  SpO2: 98 %  General appearance: alert, appears stated age and cooperative    Neck: no adenopathy and no JVD  Lungs: diminished breath sounds bilaterally chest tube in place  Heart: regular rate and rhythm, S1, S2 normal, no murmur, click, rub or gallop  Abdomen: soft, non-tender; bowel sounds normal; no masses,  no organomegaly  Extremities: extremities normal, atraumatic, no cyanosis or edema      Assessment:   Acute respiratory failure, increased oxygen demands yesterday, now some better  Likely element of micro/macro atelectasis  Recurrent right pleural effusion, status post thoracentesis in January and October  Status post VATS April 17, 2020  MELANIE Very likely  Morbid obesity/BMI 40  Severe deconditioning  Mild pulmonary hypertension  History of HERNANDEZ  History of DVT with IVC filter placement     Plan:   1. Stop BiPAP, she refuses it  2. Continue mucolytic medications  3. Solumedrol for several doses  4. Incentive spirometry  5.  Will need inpatient rehab after discharge [facility], but she refuses, not likely to do very well at home      Sally Bernal MD

## 2020-04-25 NOTE — PROGRESS NOTES
performed by Rober Jackson MD at [place] [address]         Medications Prior to Admission:    @  Prior to Admission medications    Medication Sig Start Date End Date Taking? Authorizing Provider   colestipol (COLESTID) 1 g tablet Take 1 g by mouth 2 times daily   Yes Historical Provider, MD   levothyroxine (SYNTHROID) 50 MCG tablet Take 50 mcg by mouth every morning (before breakfast)   Yes Historical Provider, MD   metFORMIN (GLUCOPHAGE) 500 MG tablet Take 500 mg by mouth daily (with breakfast)   Yes Historical Provider, MD   zinc sulfate (ORAZINC) 220 (50 Zn) MG capsule Take 1 capsule by mouth daily 10/16/19 10/15/20 Yes Reagan Pickard MD   gabapentin (NEURONTIN) 100 MG capsule Take 100 mg by mouth 3 times daily. 3/28/19  Yes Historical Provider, MD   traZODone (DESYREL) 50 MG tablet Take 100 mg by mouth nightly  3/27/19  Yes Historical Provider, MD   furosemide (LASIX) 20 MG tablet Take 20 mg by mouth every morning    Yes Historical Provider, MD   pantoprazole (PROTONIX) 40 MG tablet Take 1 tablet by mouth 2 times daily 5/16/17  Yes Chung Irwin,    rosuvastatin (CRESTOR) 10 MG tablet Take 10 mg by mouth daily   Yes Historical Provider, MD   nadolol (CORGARD) 20 MG tablet Take 0.5 tablets by mouth daily. 4/17/15  Yes Chung Irwin DO   sertraline (ZOLOFT) 50 MG tablet Take 100 mg by mouth daily    Yes Historical Provider, MD   allopurinol (ZYLOPRIM) 100 MG tablet Take 100 mg by mouth daily. Yes Historical Provider, MD   zinc sulfate (ORAZINC) 220 (50 Zn) MG capsule Take 4 capsules by mouth daily 4/15/20 5/15/20  Trevor Valles APRN - CNP   influenza virus trivalent vaccine (FLUZONE) injection Inject 0.5 mLs into the muscle once Given 1/2020    Historical Provider, MD       Allergies:  Patient has no known allergies.     Social History:   Social History     Socioeconomic History    Marital status:      Spouse name: Not on file    Number of children: Not on file    Years of education: Not on file    Highest education level: Not on file   Occupational History    Not on file   Social Needs    Financial resource strain: Not on file    Food insecurity     Worry: Not on file     Inability: Not on file    Transportation needs     Medical: Not on file     Non-medical: Not on file   Tobacco Use    Smoking status: Never Smoker    Smokeless tobacco: Never Used   Substance and Sexual Activity    Alcohol use: No    Drug use: No    Sexual activity: Not on file   Lifestyle    Physical activity     Days per week: Not on file     Minutes per session: Not on file    Stress: Not on file   Relationships    Social connections     Talks on phone: Not on file     Gets together: Not on file     Attends Jew service: Not on file     Active member of club or organization: Not on file     Attends meetings of clubs or organizations: Not on file     Relationship status: Not on file    Intimate partner violence     Fear of current or ex partner: Not on file     Emotionally abused: Not on file     Physically abused: Not on file     Forced sexual activity: Not on file   Other Topics Concern    Not on file   Social History Narrative    Not on file       Family History:   History reviewed. No pertinent family history. REVIEW OF SYSTEMS:    Gen: Patient denies any lightheadedness or dizziness. No LOC or syncope. No fevers or chills. HEENT: No earache, sore throat or nasal congestion. Resp: Denies cough, hemoptysis or sputum production. Cardiac: Denies chest pain, +SOB, no diaphoresis or palpitations. GI: No nausea, vomiting, diarrhea or constipation. No melena or hematochezia. : No urinary complaints, dysuria, hematuria or frequency. MSK: + extremity weakness, paralysis or paresthesias.      PHYSICAL EXAM:    Vitals:  /60   Pulse 67   Temp 96.4 °F (35.8 °C) (Oral)   Resp 18   Ht 5' 5\" (1.651 m)   Wt 244 lb 4 oz (110.8 kg)   SpO2 98%   BMI 40.65 kg/m²

## 2020-04-25 NOTE — PROGRESS NOTES
Patient refused to wear Bipap throughout [id number] shift. Patient refused to turn or be repositioned throughout 4897-1400 shift.

## 2020-04-25 NOTE — PROGRESS NOTES
No change in exam  Continues to drain large amount of fluid per ct  Needs to be evauated for TIPPS  Continue auction to ct

## 2020-04-25 NOTE — PROGRESS NOTES
Dr. Zenia Rangel rounding at this time and was notified that patient refused to wear Bipap last night. Dr. Zenia Rangel stated to discontinue Bipap order.

## 2020-04-25 NOTE — PROGRESS NOTES
PROGRESS NOTE  By Alissa [place] M.D. [facility]  Dr. Brendolyn Brittle, M.D.,  Dr. Gavin Inman M.D.,   Dr. Estrada Officer, D.O.,  Dr. Suhail Martinez M.D.,  Dr Carmel Min, Solisdebbie Dane Steele  69 y.o.  female    SUBJECTIVE:  Abdominal pain. Denies nausea or vomiting. OBJECTIVE:    /60   Pulse 67   Temp 96.4 °F (35.8 °C) (Oral)   Resp 18   Ht 5' 5\" (1.651 m)   Wt 244 lb 4 oz (110.8 kg)   SpO2 98%   BMI 40.65 kg/m²     General: NAD/obese  female. AAO x3  HEENT: Anicteric sclera/moist oral mucosa  Neck: Supple/trachea is midline  Chest: Symmetrical excursion/now with respiration. R chest tube with clear drainage  Cor: Regular/S1-S2  Abd.: Soft and obese. Nontender  Extr.:  Decreased muscle tolerable throughout  Skin: Warm and dry        DATA:  Stool (measured) : 0 mL  Lab Results   Component Value Date    WBC 3.0 04/25/2020    RBC 3.58 04/25/2020    HGB 8.5 04/25/2020    HCT 28.7 04/25/2020    MCV 80.2 04/25/2020    MCH 23.7 04/25/2020    MCHC 29.6 04/25/2020    RDW 19.9 04/25/2020    PLT 74 04/25/2020    MPV 10.6 04/25/2020     Lab Results   Component Value Date     04/25/2020    K 4.7 04/25/2020     04/25/2020    CO2 22 04/25/2020    BUN 38 04/25/2020    CREATININE 1.0 04/25/2020    CALCIUM 8.4 04/25/2020    PROT 5.3 04/25/2020    LABALBU 2.1 04/25/2020    LABALBU 4.0 04/30/2012    BILITOT 0.5 04/25/2020    ALKPHOS 298 04/25/2020    AST 48 04/25/2020    ALT 23 04/25/2020     Lab Results   Component Value Date    LIPASE 84 03/29/2015     Lab Results   Component Value Date    AMYLASE 58 03/28/2015         ASSESSMENT/PLAN:    1. 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  Recurrent Right

## 2020-04-26 NOTE — PROGRESS NOTES
Pulmonary (Dr. Sara Reynoso)    4/26/2020 7:59 AM  Subjective:   Admit Date: 4/14/2020  PCP: Juan Miguel Wallace DO  Interval History:  Medications, data and notes reviewed. She is awake and alert. I spoke with her. She used her oxygen Throughout the night. Not the BiPAP anymore. Not much cough or sputum. Very little activity, remains in bed most of the time. Diet: DIET CARB CONTROL;   Dietary Nutrition Supplements: Wound Healing Oral Supplement  Dietary Nutrition Supplements: Diabetic Oral Supplement        Data:   Scheduled Meds:   guaiFENesin  1,200 mg Oral BID    levoFLOXacin  750 mg Oral Daily    furosemide  20 mg Oral Daily    spironolactone  50 mg Oral Daily    sodium chloride  20 mL Intravenous Once    docusate sodium  100 mg Oral BID    midazolam  1 mg Intravenous Once    lactulose  10 g Oral BID    enoxaparin  40 mg Subcutaneous Daily    sertraline  100 mg Oral Daily    allopurinol  100 mg Oral Daily    nadolol  10 mg Oral Daily    pantoprazole  40 mg Oral BID    gabapentin  100 mg Oral TID    traZODone  100 mg Oral Nightly    cholestyramine light  4 g Oral Daily    levothyroxine  50 mcg Oral QAM AC    zinc sulfate  50 mg Oral Daily    insulin lispro  0-6 Units Subcutaneous TID WC    insulin lispro  0-3 Units Subcutaneous Nightly     Continuous Infusions:   dextrose       PRN Meds:ondansetron, HYDROcodone 5 mg - acetaminophen, sennosides-docusate sodium, acetaminophen **OR** acetaminophen, glucose, dextrose, glucagon (rDNA), dextrose      Intake/Output Summary (Last 24 hours) at 4/26/2020 0759  Last data filed at 4/26/2020 0630  Gross per 24 hour   Intake 780 ml   Output 2800 ml   Net -2020 ml       CBC:   Recent Labs     04/25/20 0722   WBC 3.0*   HGB 8.5*   PLT 74*     BMP:    Recent Labs     04/25/20 0722      K 4.7      CO2 22   BUN 38*   CREATININE 1.0   GLUCOSE 139*     Hepatic:   Recent Labs     04/25/20 0722   AST 48*   ALT 23   BILITOT 0.5   ALKPHOS 298* dose to as low as reasonably achievable. COMPARISON: None. HISTORY ORDERING SYSTEM PROVIDED HISTORY: questionable nondisplaced fractures on Xray, recent fall TECHNOLOGIST PROVIDED HISTORY: Reason for exam:->questionable nondisplaced fractures on Xray, recent fall FINDINGS: Osteopenia. No acute fracture. Mild irregularity involving the distal fibula and medial malleolus with tiny well corticated bony fragments distal to the medial malleolus. Findings likely related to previous trauma. No joint effusion. Calcaneal spurs. Vascular calcifications. Cystic change at the cuboid navicular joint. Small spurring at the cuboid navicular joint. Mild soft tissue edema. No fracture. Cta Chest W Contrast    Result Date: 4/14/2020  EXAMINATION: CTA OF THE CHEST 4/14/2020 10:19 am TECHNIQUE: CTA of the chest was performed after the administration of intravenous contrast.  Multiplanar reformatted images are provided for review. MIP images are provided for review. Dose modulation, iterative reconstruction, and/or weight based adjustment of the mA/kV was utilized to reduce the radiation dose to as low as reasonably achievable. COMPARISON: 01/20/2020, 09/29/2019 HISTORY: ORDERING SYSTEM PROVIDED HISTORY: DVT; no anticoagulation; CP and SOB TECHNOLOGIST PROVIDED HISTORY: Reason for exam:->DVT; no anticoagulation; CP and SOB FINDINGS: Pulmonary Arteries: Pulmonary arteries are adequately opacified for evaluation. No evidence of intraluminal filling defect to suggest pulmonary embolism. Main pulmonary artery is normal in caliber. Mediastinum: Aortic and coronary atherosclerosis. The thyroid is within normal limits. No pericardial effusion. The esophagus is within normal limits. No mediastinal adenopathy. Lungs/pleura: Moderate to large right pleural effusion. Trace left pleural effusion. Compressive atelectasis at the lung bases, without definite consolidation.   Focal patchy ground-glass opacity in the left upper lobe

## 2020-04-26 NOTE — PROGRESS NOTES
[address] [place] [address] [facility]  [facility]  Progress Note    NAME:Fátima Toure Session  1945  DATE:20    FACE TO FACE ENCOUNTER FOR : pneumonia    SUBJECTIVE:  Chief Complaint   Patient presents with    Fatigue     to er via ems from home for complaint of being so weak she can not get out of her chair. this has been ongoing x 1 month. when she gets up, she is falling frequently and hurt her right thigh and left ankle.  Shortness of Breath     x 1 month. also has \"chest discomfort\". much more bright   HAS NO C/O TODAY   NO CHANGES  Recurrent right pleural effusion. PROCEDURES PERFORMED:  Bronchoscopy, right video-assisted thoracic surgery. Drainage of pleural effusion, talc pleurodesis. temp better     Review of systems:  As stated above in the chief complaint, otherwise negative.     Medications:  Scheduled Meds:   levoFLOXacin  750 mg Oral Daily    furosemide  20 mg Oral Daily    spironolactone  50 mg Oral Daily    sodium chloride  20 mL Intravenous Once    docusate sodium  100 mg Oral BID    midazolam  1 mg Intravenous Once    lactulose  10 g Oral BID    enoxaparin  40 mg Subcutaneous Daily    sertraline  100 mg Oral Daily    allopurinol  100 mg Oral Daily    nadolol  10 mg Oral Daily    pantoprazole  40 mg Oral BID    gabapentin  100 mg Oral TID    traZODone  100 mg Oral Nightly    cholestyramine light  4 g Oral Daily    levothyroxine  50 mcg Oral QAM AC    zinc sulfate  50 mg Oral Daily    insulin lispro  0-6 Units Subcutaneous TID WC    insulin lispro  0-3 Units Subcutaneous Nightly     Continuous Infusions:   dextrose       PRN Meds:ondansetron, HYDROcodone 5 mg - acetaminophen, sennosides-docusate sodium, acetaminophen **OR** acetaminophen, glucose, dextrose, glucagon (rDNA), dextrose    OBJECTIVE:  BP (!) 129/58   Pulse 74   Temp 98.6 °F (37 °C) (Oral)   Resp 18   Ht 5' 5\" (1.651 m)   Wt 244 lb 4 oz (110.8 kg)   SpO2 98%   BMI 40.65 kg/m²   Temp  Av 01/20/2020 1330    FUNGSM No Fungal elements seen 04/17/2020 1253       Body Fluid Culture, Sterile   Date Value Ref Range Status   04/17/2020 Growth not present  Final        ASSESSMENT/PLAN:  Fevers RESOLVED AFTER removING TLC   LEUKOPENIA FOLLOW  Right pleural effusion/loculated cx ngtd  Pneumonia RAMONE  Cholelithiasis and gallbladder wall thickening LFT up   Pancytopenia h/o liver cirrhosis/freeman FOLLOW     S/p  Surgery HAS CT IN PLACE  Cath tip ngtd  Oral levaquin plan 3 weeks from  4/17-5/8 [facility]  F/u labs in am   Inc acitvity  For poss transfer to [facility] for TIPPS? · Monitor labs    Imaging and labs were reviewed per medical records. The patient was educated about the diagnosis, prognosis, indications, risks and benefits of treatment. An opportunity to ask questions was given to the patient/FAMILY.       Electronically signed by Ross Eldridge MD on 4/26/2020 at 2:13 PM

## 2020-04-27 NOTE — PROGRESS NOTES
perceptual skills: intact                Glasses: yes   Edema: no     Sensation: intact   Hand Dominance:  Left     X  Right       Left Right Comment   Passive range of motion [facility]      Active range of motion [facility]      Muscle Grade 3/5 3/5     /pinch Strength Intact  Intact      Additional Information: Good  and wfl FMC/dexterity noted during ADL tasks Good  and wfl FMC/dexterity noted during ADL tasks      *Pt declined ROM ex's at this time d/t fatigue    Functional Assessment:    Initial Eval Status  Date: 4/16/2020 Treatment Status Date: 4/27/2020 STGs = LTGs  Time frame: 5-7 days   Feeding Independent  N/T Independent    Grooming Maximal Assist to comb hair x 2 reps, don/doff shampoo cap and towel dry hair  N/T Independent    UB Dressing Moderate Assist  Max A to tie back of gown Independent    LB Dressing Dependent  Dep to don/doff socks and prevo boots Minimal Assist    Bathing Maximal Assist to sponge bathe at EOB NT Minimal Assist    Toileting Maximal Assist for hygiene   NT Minimal Assist    Bed Mobility  Supine to sit: Maximal Assist x 2  Scooting:Maximal Assist x 2  Sit to supine: Maximal Assist x 2  Max A x 2 for supine to sit; max A x 2 for scooting; max A x 2 for sit to supine; scoot to [facility][1] with max A x 2 with bed in trendelenburg position; side rolling at max A x 2 for adjustment of linens Supine to sit: Supervision   Sit to supine: Supervision    Functional Transfers Maximal Assist x 2 from EOB to wheeled walker Max A x 2 for sit to stand from EOB x 2 reps with use of chux pad as sling; Pt able to stand upright on 1st stand; pt able to partially stand on 2nd rep Supervision    Functional Mobility Maximal Assist x 2 with wheeled walker, 4 side steps towards head of bed  NT d/t poor standing tolerance Supervision with wheeled walker    Activity Tolerance Fair   Fair/fair minus Good         Comments:   Nursing approved therapy session.  Upon arrival, patient supine in bed and agreeable

## 2020-04-27 NOTE — PROGRESS NOTES
[address] [place] [address] [facility]  Progress Note    NAME:Fátima Mera  1945  DATE:04/27/20    FACE TO FACE ENCOUNTER FOR : pneumonia    SUBJECTIVE:  Chief Complaint   Patient presents with    Fatigue     to er via ems from home for complaint of being so weak she can not get out of her chair. this has been ongoing x 1 month. when she gets up, she is falling frequently and hurt her right thigh and left ankle.  Shortness of Breath     x 1 month. also has \"chest discomfort\". seen this am   HAS NO C/O     Recurrent right pleural effusion. PROCEDURES PERFORMED:  Bronchoscopy, right video-assisted thoracic surgery. Drainage of pleural effusion, talc pleurodesis. temp better     Review of systems:  As stated above in the chief complaint, otherwise negative.     Medications:  Scheduled Meds:   [START ON 4/28/2020] furosemide  40 mg Oral Daily    [START ON 4/28/2020] spironolactone  100 mg Oral Daily    levoFLOXacin  750 mg Oral Daily    sodium chloride  20 mL Intravenous Once    docusate sodium  100 mg Oral BID    midazolam  1 mg Intravenous Once    lactulose  10 g Oral BID    enoxaparin  40 mg Subcutaneous Daily    sertraline  100 mg Oral Daily    allopurinol  100 mg Oral Daily    nadolol  10 mg Oral Daily    pantoprazole  40 mg Oral BID    gabapentin  100 mg Oral TID    traZODone  100 mg Oral Nightly    cholestyramine light  4 g Oral Daily    levothyroxine  50 mcg Oral QAM AC    zinc sulfate  50 mg Oral Daily    insulin lispro  0-6 Units Subcutaneous TID WC    insulin lispro  0-3 Units Subcutaneous Nightly     Continuous Infusions:   dextrose       PRN Meds:ondansetron, HYDROcodone 5 mg - acetaminophen, sennosides-docusate sodium, acetaminophen **OR** acetaminophen, glucose, dextrose, glucagon (rDNA), dextrose    OBJECTIVE:  /60   Pulse 74   Temp 97.5 °F (36.4 °C) (Oral)   Resp 17   Ht 5' 5\" (1.651 m)   Wt 244 lb 4 oz (110.8 kg)   SpO2 99%   BMI 40.65

## 2020-04-27 NOTE — ADT AUTH CERT
of drainage   Abd.: soft, NT, ND, BS +, large pannus    Unfortunately, patient's chest tube continues to drain a large    amount of serosanguineous fluid.     Patient was discussed with musa Patel to pull patient's    chest tube and monitor patient's response. Patient's lab work    and hemoglobin at this time are stable no indication for    emergent TIPS procedure.     We will increase patient's Aldactone 200 mg and Lasix to    40 daily while placing the patient on a 2 g sodium diet. VASC SURG NOTE:   After d/w GI their recommendation is removal of ct   Rt ct removed   Pt tolerated well   cxr to follow      1200 pcxr:   Continued cardiomegaly with bilateral pleural effusions and pulmonary   vascular congestion.  Improved aeration of the right lower lobe    when compared to previous. cont zyloprim, colestid, sq lovenox, coreg   cont synthroid, neurontin, chronulac, protonix   cont po levaquin 750mg qd . . started 4/21    increase po lasix 20mg to 40mg qd    increase aldactone 50mg to 100mg qd       d/c plan: SW working on SNF placement when medically stable

## 2020-04-27 NOTE — PROGRESS NOTES
Feels ok  No sob  Ct still draining large amount  Talked to GI regarding TIPPS  Awaiting on their move

## 2020-04-27 NOTE — PROGRESS NOTES
Physical Therapy    Physical Therapy Treatment Note    Room #:  9202/9273-73  Patient Name: Ranjana Banks  YOB: 1945  MRN: [id number]    Referring Provider: Marcela Mercado MD    Date of Service: 4/27/2020    Evaluating Physical Therapist: June Robles, PT  Lic.  # [id number]      Diagnosis: Rhabdomyolysis [M62.82]  Rhabdomyolysis [M62.82]        Patient Active Problem List   Diagnosis    Hypothyroidism    Hypertension    Type 2 diabetes mellitus with other specified complication ([facility]    Gout    Upper GI bleed    Normocytic anemia    Obesity    Esophageal varices (HCC)    DVT (deep venous thrombosis) (HCC)    Calculus of gallbladder with acute on chronic cholecystitis without obstruction    Rhabdomyolysis    Red blood cell antibody positive    Iron deficiency anemia    Recurrent right pleural effusion    Acute respiratory failure with hypoxia (HCC)    Suspected COVID-19 virus infection    Pneumonia of left lung due to infectious organism    Pancytopenia ([facility]    Hyperlipidemia        Tentative placement recommendation: Subacute vs Home Health Physical Therapy if patient meets goals    Equipment recommendation: Wheelchair, 22-24 inch, foot rest   Prior Level of Function: Patient ambulated with wheeled walker with assist short distance d/t right knee buckling  Rehab Potential: good for goals    Past medical history:   Past Medical History:   Diagnosis Date    Blood transfusion     Gout     Hx of blood clots     right knee    Hyperlipidemia     Hypertension     Hypothyroidism     Morbid obesity ([facility]     Non-insulin dependent type 2 diabetes mellitus ([facility]      Past Surgical History:   Procedure Laterality Date    ENDOSCOPY, COLON, DIAGNOSTIC      HERNIA REPAIR      umbillical    KNEE ARTHROSCOPY  11/8/2012    right knee arthroscopy    OTHER SURGICAL HISTORY N/A 4-13-15    push enteroscopy    OTHER SURGICAL HISTORY      edd filter      2 months    THORACOSCOPY Right pumps, quad sets, heel slide, hip abduction/adduction, straight leg raise and long arc quad, x 10 - 15 reps. V/C for technique. At end of session, patient was in bed with bariatric low air loss bed , call light and phone within reach, all lines were intact, and nursing was notified. ASSESSMENT:   Pt able to sit with Supervision using her RUE on the bed rail for support and maintaining mid-line position. Pt verbally expresses pain with all movements. Pt needing assist x 2 to stand. Pt able to attain a 1/2 upright position on the first stand; and 1/4 upright position on the second stand. Pt was getting tired and requested to lay back down. Patient would benefit from continued skilled Physical Therapy to improve functional independence and quality of life. PLAN:    Patient is making fair progress towards established goals, will continue with current plan of care. Time in: 10:25  Time out: 11:03    Total Treatment Time:  38 minutes    CPT codes:  Therapeutic activities (32306)   25 minutes  2 unit(s)  Therapeutic exercises (44684)   13 minutes  1 unit(s)    Harvinder An  [facility]  LIC # [id number]

## 2020-04-27 NOTE — PROGRESS NOTES
Department of Internal Medicine            HISTORY OF PRESENT ILLNESS:      The patient is a 69 y.o. female who presents with chronic right thigh pain is been present for about 5 months with increased weakness and falling. Patient was having increased shortness of breath when laying down. Patient also has chronic bilateral lower extremity edema. Patient initially denied any fever and chills or cough. CT chest done in the ED showed moderate to large right pleural effusion with a large patchy groundglass opacity in left upper lobe as well is a smaller peripheral groundglass opacity in lingula. The patient initially was worked up for [place] [id number] and was given to the [facility] hospitalist group. Patient was consult with cardiology, pulmonary, GI and vascular surgery for a VATS procedure. Patient was cleared by cardiology and GI and pulmonary and was set up for surgery today. Patient currently denies any chest pain or abdominal pain. Patient's temperature is been normal throughout hospitalization with current heart rate of 68 and sinus rhythm respiratory rate of 20 and blood pressure currently 148/67. O2 saturation 96% on 1 L nasal cannula. Patient blood sugars range 107- 130 off of her Glucophage. WBC is 2.3 with hemoglobin 7.1. Platelet count is 80. Patient was ordered 2 units of packed RBCs to be transfused this morning. 4/18/2020  Patient was seen and examined on telemetry floor. Case discussed with Dr. Nathalia Bolaños today. Patient doing very well and has postop discomfort. Blood sugars ranging from 116-165. Hemoglobin is 8.8 with a WBC of 5.2 and platelet count 88. Temperature 97.3 with a heart rate of 70. Blood pressure currently 145/63 with O2 sat in 99%. Urine output is fairly good. 4/19/2020  Patient was seen and examined on telemetry floor. Patient very sleepy today. She complains of spasm around surgical site but otherwise she feels fairly good. Temperature 97.8 with a heart rate of 68.   Respiratory dependent type 2 diabetes mellitus ([facility]      Past Surgical History:    Past Surgical History:   Procedure Laterality Date    ENDOSCOPY, COLON, DIAGNOSTIC      HERNIA REPAIR      umbillical    KNEE ARTHROSCOPY  11/8/2012    right knee arthroscopy    OTHER SURGICAL HISTORY N/A 4-13-15    push enteroscopy    OTHER SURGICAL HISTORY      edd filter      2 months    THORACOSCOPY Right 4/17/2020    FIBEROPTIC BRONCHOSCOPY, RIGHT VIDEO ASSISTED THORACOSCOPY DECORTACATION AND PLEURIDESIS performed by Ludmila Koroma MD at [address]         Medications Prior to Admission:    @  Prior to Admission medications    Medication Sig Start Date End Date Taking? Authorizing Provider   colestipol (COLESTID) 1 g tablet Take 1 g by mouth 2 times daily   Yes Historical Provider, MD   levothyroxine (SYNTHROID) 50 MCG tablet Take 50 mcg by mouth every morning (before breakfast)   Yes Historical Provider, MD   metFORMIN (GLUCOPHAGE) 500 MG tablet Take 500 mg by mouth daily (with breakfast)   Yes Historical Provider, MD   zinc sulfate (ORAZINC) 220 (50 Zn) MG capsule Take 1 capsule by mouth daily 10/16/19 10/15/20 Yes Bridger Moore MD   gabapentin (NEURONTIN) 100 MG capsule Take 100 mg by mouth 3 times daily. 3/28/19  Yes Historical Provider, MD   traZODone (DESYREL) 50 MG tablet Take 100 mg by mouth nightly  3/27/19  Yes Historical Provider, MD   furosemide (LASIX) 20 MG tablet Take 20 mg by mouth every morning    Yes Historical Provider, MD   pantoprazole (PROTONIX) 40 MG tablet Take 1 tablet by mouth 2 times daily 5/16/17  Yes Kan Causey, DO   rosuvastatin (CRESTOR) 10 MG tablet Take 10 mg by mouth daily   Yes Historical Provider, MD   nadolol (CORGARD) 20 MG tablet Take 0.5 tablets by mouth daily. 4/17/15  Yes Kan Causey, DO   sertraline (ZOLOFT) 50 MG tablet Take 100 mg by mouth daily    Yes Historical Provider, MD   allopurinol (ZYLOPRIM) 100 MG tablet Take 100 mg by mouth daily.      Yes GFRAA >60 04/27/2020    LABGLOM 54 04/27/2020    GLUCOSE 127 04/27/2020    GLUCOSE 237 04/30/2012    PROT 5.3 04/25/2020    LABALBU 2.1 04/25/2020    LABALBU 4.0 04/30/2012    CALCIUM 8.4 04/27/2020    BILITOT 0.5 04/25/2020    ALKPHOS 298 04/25/2020    AST 48 04/25/2020    ALT 23 04/25/2020     Magnesium:    Lab Results   Component Value Date    MG 1.8 04/18/2020     Phosphorus:    Lab Results   Component Value Date    PHOS 4.4 04/18/2020     PT/INR:    Lab Results   Component Value Date    PROTIME 13.4 04/17/2020    INR 1.2 04/17/2020     Troponin:    Lab Results   Component Value Date    TROPONINI 0.02 04/14/2020     U/A:    Lab Results   Component Value Date    COLORU Yellow 04/14/2020    PROTEINU Negative 04/14/2020    PHUR 7.0 04/14/2020    WBCUA 0-1 04/14/2020    WBCUA NONE 12/21/2011    RBCUA 0-1 04/14/2020    RBCUA NONE 12/21/2011    BACTERIA RARE 04/14/2020    CLARITYU Clear 04/14/2020    SPECGRAV 1.015 04/14/2020    LEUKOCYTESUR TRACE 04/14/2020    UROBILINOGEN 0.2 04/14/2020    BILIRUBINUR Negative 04/14/2020    BILIRUBINUR NEGATIVE 12/21/2011    BLOODU SMALL 04/14/2020    GLUCOSEU Negative 04/14/2020    GLUCOSEU 500 12/21/2011     ABG:    Lab Results   Component Value Date    PH 7.289 04/20/2020    PCO2 49.5 04/20/2020    PO2 152.8 04/20/2020    HCO3 23.2 04/20/2020    BE -3.5 04/20/2020    O2SAT 98.6 04/20/2020     HgBA1c:    Lab Results   Component Value Date    LABA1C 5.5 04/14/2020     FLP:    Lab Results   Component Value Date    TRIG 89 09/07/2019    HDL 33 09/07/2019    LDLCALC 61 09/07/2019    LABVLDL 18 09/07/2019     TSH:    Lab Results   Component Value Date    TSH 8.430 04/14/2020     IRON:    Lab Results   Component Value Date    IRON 45 10/02/2019     LIPASE:    Lab Results   Component Value Date    LIPASE 84 03/29/2015       ASSESSMENT AND PLAN:      Patient Active Problem List    Diagnosis Date Noted    Esophageal varices ([facility] 04/13/2015     Priority: High    Upper GI bleed

## 2020-04-27 NOTE — PROGRESS NOTES
Pulmonary (Dr. Paco Luu)    4/27/2020 8:30 AM  Subjective:   Admit Date: 4/14/2020  PCP: Darell Wallace DO  Interval History:  Medications, data and notes reviewed. She is awake and alert. I spoke with her. She used her oxygen Throughout the night. Not the BiPAP anymore. Not much cough or sputum. Very little activity, remains in bed most of the time. No new complaints. Needs to increase activity    Diet: DIET CARB CONTROL;   Dietary Nutrition Supplements: Wound Healing Oral Supplement  Dietary Nutrition Supplements: Diabetic Oral Supplement        Data:   Scheduled Meds:   levoFLOXacin  750 mg Oral Daily    furosemide  20 mg Oral Daily    spironolactone  50 mg Oral Daily    sodium chloride  20 mL Intravenous Once    docusate sodium  100 mg Oral BID    midazolam  1 mg Intravenous Once    lactulose  10 g Oral BID    enoxaparin  40 mg Subcutaneous Daily    sertraline  100 mg Oral Daily    allopurinol  100 mg Oral Daily    nadolol  10 mg Oral Daily    pantoprazole  40 mg Oral BID    gabapentin  100 mg Oral TID    traZODone  100 mg Oral Nightly    cholestyramine light  4 g Oral Daily    levothyroxine  50 mcg Oral QAM AC    zinc sulfate  50 mg Oral Daily    insulin lispro  0-6 Units Subcutaneous TID WC    insulin lispro  0-3 Units Subcutaneous Nightly     Continuous Infusions:   dextrose       PRN Meds:ondansetron, HYDROcodone 5 mg - acetaminophen, sennosides-docusate sodium, acetaminophen **OR** acetaminophen, glucose, dextrose, glucagon (rDNA), dextrose      Intake/Output Summary (Last 24 hours) at 4/27/2020 0830  Last data filed at 4/27/2020 0655  Gross per 24 hour   Intake 120 ml   Output 2695 ml   Net -2575 ml       CBC:   Recent Labs     04/25/20  0722 04/27/20  0653   WBC 3.0*  --    HGB 8.5* 8.8*   PLT 74*  --      BMP:    Recent Labs     04/25/20  0722 04/27/20  0653    134   K 4.7 4.4    106   CO2 22 22   BUN 38* 31*   CREATININE 1.0 1.0   GLUCOSE 139* 127*     Hepatic: definite consolidation. Focal patchy ground-glass opacity in the left upper lobe as well as smaller, more peripheral ground-glass opacities in the left upper lobe. The central airways are grossly patent. Upper Abdomen: Splenomegaly. Small amount of ascites. The liver is mildly cirrhotic in appearance. Soft Tissues/Bones: No acute bone or soft tissue abnormality. 1. No evidence of pulmonary embolism. 2. New large patchy ground-glass opacity in the left upper lobe as well as smaller, more peripheral ground-glass opacities in the lingula. The differential favors an infectious process including viral and atypical pneumonia. 3. Moderate to large right pleural effusion. 4. Cirrhotic appearance of the liver with splenomegaly and a small amount of ascites. Us Abdomen Limited    Result Date: 4/15/2020  EXAMINATION: RIGHT UPPER QUADRANT ULTRASOUND 4/15/2020 4:44 pm COMPARISON: 08/13/2018 HISTORY: ORDERING SYSTEM PROVIDED HISTORY: RUQ:  rule out hepatic mass and abdominal for ascites  PROVIDED HISTORY: Reason for exam:->RUQ:  rule out hepatic mass and abdominal for ascites survery Reason for exam:->Abdominal US for ascites survery FINDINGS: LIVER:  There is diffusely increased abnormal echogenicity throughout the liver suggestive of hepatocellular disease. As result there is decreased through transmission of sound which decreases sensitivity for underlying parenchymal mass. BILIARY SYSTEM:  Multiple stones identified within the gallbladder. Sonographic Justin Holms sign is negative. Common bile duct measures 5 mm. Gallbladder wall thickening measuring 5 mm Common bile duct is within normal limits measuring 5 mm RIGHT KIDNEY: The right kidney is grossly unremarkable without evidence of hydronephrosis. PANCREAS:  Visualized portions of the pancreas are unremarkable. OTHER: There is ascites within the right upper quadrant. Right-sided pleural effusion noted.      Ascites within the right upper quadrant thoracentesis in January and October  Status post VATS April 17, 2020  MELANIE Very likely  Morbid obesity/BMI 40  Severe deconditioning  Mild pulmonary hypertension  History of HERNANDEZ  History of DVT with IVC filter placement     Plan:   1. Stop BiPAP, she refuses it  2. Continue mucolytic medications  3. Solumedrol for several doses  4. Incentive spirometry  5. Increase activity, but she is very debilitated, she will not do well at home even with home health aide. Jose Pop MD

## 2020-04-27 NOTE — PROGRESS NOTES
effusion. Cholelithiasis and gallbladder wall thickening measuring 5 mm. Clinical correlation is strongly recommended. This can be seen in setting of the hypoproteinemic state or volume overload amongst other etiologies. Cholecystitis may be considered differential if there are symptoms of there is right upper quadrant pain or discomfort. Cirrhotic appearance of the liver. Us Dup Lower Extremity Right Aureliano    Result Date: 4/14/2020  EXAMINATION: DUPLEX VENOUS ULTRASOUND OF THE RIGHT LOWER EXTREMITY, 4/14/2020 10:37 am TECHNIQUE: Duplex ultrasound and Doppler images were obtained of the right lower extremity. COMPARISON: None. HISTORY: Reason for exam:->evaluate DVT Pain FINDINGS: The visualized veins of the right lower extremity are patent and free of echogenic thrombus. The veins are normally compressible and have normal phasic flow. No evidence of DVT in the right lower extremity. Objective:   Vitals: BP (!) 129/59   Pulse 68   Temp 97.6 °F (36.4 °C) (Oral)   Resp 18   Ht 5' 5\" (1.651 m)   Wt 244 lb 4 oz (110.8 kg)   SpO2 99%   BMI 40.65 kg/m²  O2 Flow Rate (L/min): 2 L/min  Ventilator Data  (if applicable):   VENT SETTINGS:   Vent Information  Skin Assessment: Clean, dry, & intact  FiO2 : 60 %  SpO2: 99 %  SpO2/FiO2 ratio: 140  Additional Respiratory  Assessments  Pulse: 68  Resp: 18  SpO2: 99 %  General appearance: alert, appears stated age and cooperative    Neck: no adenopathy and no JVD/Short obese neck  Lungs: diminished breath sounds bilaterally chest tube in place  Heart: regular rate and rhythm, S1, S2 normal, no murmur, click, rub or gallop  Abdomen: soft, non-tender; bowel sounds normal; no masses,  no organomegaly/Morbidly obese.   Difficult to examine  Extremities: extremities normal, atraumatic, no cyanosis or edema      Assessment:   Acute respiratory failure  Likely element of micro/macro atelectasis  Recurrent right pleural effusion, status post thoracentesis in January

## 2020-04-28 NOTE — PROGRESS NOTES
Pulmonary (Dr. Kathrine Torres)    4/28/2020 7:42 AM  Subjective:   Admit Date: 4/14/2020  PCP: Earnest Wallace,   Interval History:  Medications, data and notes reviewed. She is awake and alert. I spoke with her. She used her oxygen Throughout the night. Not the BiPAP anymore. Not much cough or sputum. Very little activity, remains in bed most of the time. No new complaints. Needs to increase activity. CT has been removed.   CXR reviewed    Diet: Dietary Nutrition Supplements: Wound Healing Oral Supplement  Dietary Nutrition Supplements: Diabetic Oral Supplement  DIET CARB CONTROL; Low Sodium (2 GM)        Data:   Scheduled Meds:   furosemide  40 mg Oral Daily    spironolactone  100 mg Oral Daily    levoFLOXacin  750 mg Oral Daily    sodium chloride  20 mL Intravenous Once    docusate sodium  100 mg Oral BID    midazolam  1 mg Intravenous Once    lactulose  10 g Oral BID    enoxaparin  40 mg Subcutaneous Daily    sertraline  100 mg Oral Daily    allopurinol  100 mg Oral Daily    nadolol  10 mg Oral Daily    pantoprazole  40 mg Oral BID    gabapentin  100 mg Oral TID    traZODone  100 mg Oral Nightly    cholestyramine light  4 g Oral Daily    levothyroxine  50 mcg Oral QAM AC    zinc sulfate  50 mg Oral Daily    insulin lispro  0-6 Units Subcutaneous TID WC    insulin lispro  0-3 Units Subcutaneous Nightly     Continuous Infusions:   dextrose       PRN Meds:ondansetron, HYDROcodone 5 mg - acetaminophen, sennosides-docusate sodium, acetaminophen **OR** acetaminophen, glucose, dextrose, glucagon (rDNA), dextrose      Intake/Output Summary (Last 24 hours) at 4/28/2020 0742  Last data filed at 4/28/2020 0640  Gross per 24 hour   Intake 930 ml   Output 900 ml   Net 30 ml       CBC:   Recent Labs     04/27/20  0653   HGB 8.8*     BMP:    Recent Labs     04/27/20  0653 04/28/20  0454    136   K 4.4 4.2    106   CO2 22 23   BUN 31* 31*   CREATININE 1.0 1.0   GLUCOSE 127* 176*     Hepatic: effusions with most likely associated atelectasis. There is improved aeration of the right lower lobe when compared to previous. Fluid in the fissure is seen. There is continued but decreased pulmonary vascular congestion. Heart silhouette is enlarged. Trachea is midline. Visualized bony thorax shows no acute abnormality. Continued cardiomegaly with bilateral pleural effusions and pulmonary vascular congestion. Improved aeration of the right lower lobe when compared to previous. Xr Chest Portable    Result Date: 4/24/2020  EXAMINATION: ONE XRAY VIEW OF THE CHEST 4/20/2020 6:57 am COMPARISON: 04/19/2020 HISTORY: ORDERING SYSTEM PROVIDED HISTORY: post op TECHNOLOGIST PROVIDED HISTORY: Reason for exam:->post op Follow-up FINDINGS: Monitor wires overlie the chest.  There is a right internal jugular central venous line with the tip in the superior vena cava. The patient is rotated toward the right. The cardiac silhouette is unremarkable. There is worsening airspace disease in the right lung base with volume loss in the right lung apex and a loculated pleural effusion. There is stable airspace disease in the left lung base. Worsening airspace changes in the right lung with airspace disease in the right lung base and new volume loss and pleural fluid in the right lung apex. Stable airspace disease in left lung base. Follow up to resolution is suggested. Xr Chest Portable    Result Date: 4/24/2020  EXAMINATION: ONE XRAY VIEW OF THE CHEST 4/24/2020 9:34 am COMPARISON: 04/22/2020 HISTORY: ORDERING SYSTEM PROVIDED HISTORY: pleural effusion TECHNOLOGIST PROVIDED HISTORY: Reason for exam:->pleural effusion FINDINGS: There is stable cardiomegaly with pulmonary vascular congestion and interstitial and alveolar edema. There are probable small bilateral pleural effusions. Bibasilar increased density may represent atelectasis, edema, or superimposed infectious pneumonitis.      Stable exam     Xr Chest place with decreased right pleural effusion when correlated to CT from 3 days earlier. Persistent perihilar infiltrates with left basilar consolidation likely on the basis of pulmonary edema. Ct Ankle Left Wo Contrast    Result Date: 4/16/2020  EXAMINATION: CT OF THE LEFT ANKLE WITHOUT CONTRAST 4/15/2020 4:37 pm TECHNIQUE: CT of the left ankle was performed without the administration of intravenous contrast.  Multiplanar reformatted images are provided for review. Dose modulation, iterative reconstruction, and/or weight based adjustment of the mA/kV was utilized to reduce the radiation dose to as low as reasonably achievable. COMPARISON: None. HISTORY ORDERING SYSTEM PROVIDED HISTORY: questionable nondisplaced fractures on Xray, recent fall TECHNOLOGIST PROVIDED HISTORY: Reason for exam:->questionable nondisplaced fractures on Xray, recent fall FINDINGS: Osteopenia. No acute fracture. Mild irregularity involving the distal fibula and medial malleolus with tiny well corticated bony fragments distal to the medial malleolus. Findings likely related to previous trauma. No joint effusion. Calcaneal spurs. Vascular calcifications. Cystic change at the cuboid navicular joint. Small spurring at the cuboid navicular joint. Mild soft tissue edema. No fracture. Cta Chest W Contrast    Result Date: 4/14/2020  EXAMINATION: CTA OF THE CHEST 4/14/2020 10:19 am TECHNIQUE: CTA of the chest was performed after the administration of intravenous contrast.  Multiplanar reformatted images are provided for review. MIP images are provided for review. Dose modulation, iterative reconstruction, and/or weight based adjustment of the mA/kV was utilized to reduce the radiation dose to as low as reasonably achievable.  COMPARISON: 01/20/2020, 09/29/2019 HISTORY: ORDERING SYSTEM PROVIDED HISTORY: DVT; no anticoagulation; CP and SOB TECHNOLOGIST PROVIDED HISTORY: Reason for exam:->DVT; no anticoagulation; CP and SOB FINDINGS: Pulmonary Arteries: Pulmonary arteries are adequately opacified for evaluation. No evidence of intraluminal filling defect to suggest pulmonary embolism. Main pulmonary artery is normal in caliber. Mediastinum: Aortic and coronary atherosclerosis. The thyroid is within normal limits. No pericardial effusion. The esophagus is within normal limits. No mediastinal adenopathy. Lungs/pleura: Moderate to large right pleural effusion. Trace left pleural effusion. Compressive atelectasis at the lung bases, without definite consolidation. Focal patchy ground-glass opacity in the left upper lobe as well as smaller, more peripheral ground-glass opacities in the left upper lobe. The central airways are grossly patent. Upper Abdomen: Splenomegaly. Small amount of ascites. The liver is mildly cirrhotic in appearance. Soft Tissues/Bones: No acute bone or soft tissue abnormality. 1. No evidence of pulmonary embolism. 2. New large patchy ground-glass opacity in the left upper lobe as well as smaller, more peripheral ground-glass opacities in the lingula. The differential favors an infectious process including viral and atypical pneumonia. 3. Moderate to large right pleural effusion. 4. Cirrhotic appearance of the liver with splenomegaly and a small amount of ascites. Us Abdomen Limited    Result Date: 4/15/2020  EXAMINATION: RIGHT UPPER QUADRANT ULTRASOUND 4/15/2020 4:44 pm COMPARISON: 08/13/2018 HISTORY: ORDERING SYSTEM PROVIDED HISTORY: RUQ:  rule out hepatic mass and abdominal for ascites  PROVIDED HISTORY: Reason for exam:->RUQ:  rule out hepatic mass and abdominal for ascites survery Reason for exam:->Abdominal US for ascites survery FINDINGS: LIVER:  There is diffusely increased abnormal echogenicity throughout the liver suggestive of hepatocellular disease. As result there is decreased through transmission of sound which decreases sensitivity for underlying parenchymal mass.  BILIARY SYSTEM:  Multiple stones identified within the gallbladder. Sonographic Theresa Knock sign is negative. Common bile duct measures 5 mm. Gallbladder wall thickening measuring 5 mm Common bile duct is within normal limits measuring 5 mm RIGHT KIDNEY: The right kidney is grossly unremarkable without evidence of hydronephrosis. PANCREAS:  Visualized portions of the pancreas are unremarkable. OTHER: There is ascites within the right upper quadrant. Right-sided pleural effusion noted. Ascites within the right upper quadrant and right-sided pleural effusion. Cholelithiasis and gallbladder wall thickening measuring 5 mm. Clinical correlation is strongly recommended. This can be seen in setting of the hypoproteinemic state or volume overload amongst other etiologies. Cholecystitis may be considered differential if there are symptoms of there is right upper quadrant pain or discomfort. Cirrhotic appearance of the liver. Us Dup Lower Extremity Right Aureliano    Result Date: 4/14/2020  EXAMINATION: DUPLEX VENOUS ULTRASOUND OF THE RIGHT LOWER EXTREMITY, 4/14/2020 10:37 am TECHNIQUE: Duplex ultrasound and Doppler images were obtained of the right lower extremity. COMPARISON: None. HISTORY: Reason for exam:->evaluate DVT Pain FINDINGS: The visualized veins of the right lower extremity are patent and free of echogenic thrombus. The veins are normally compressible and have normal phasic flow. No evidence of DVT in the right lower extremity. Fl Modified Barium Swallow W Video    Result Date: 4/22/2020  EXAMINATION: MODIFIED BARIUM SWALLOW WAS PERFORMED IN CONJUNCTION WITH [facility] 04/22/2020 TECHNIQUE: Fluoroscopic evaluation of the swallowing mechanism was performed with multiple consistency of barium product.  FLUOROSCOPY DOSE AND TYPE OR TIME AND EXPOSURES: 0.8 minutes, 3.65 mGy per cm squared COMPARISON: None HISTORY: ORDERING SYSTEM PROVIDED HISTORY: trouble swallowing TECHNOLOGIST PROVIDED HISTORY:

## 2020-04-28 NOTE — CARE COORDINATION
SOCIAL WORK / DISCHARGE PLANNING:  COVID negative. Pt will need JEN at discharge. [facility], [phone], returned voice message that does have bed available and request fax referral [phone]. [phone] but will need PRECERT prior to discharge. Pt has chest tube removed 4/27,unsure when will be medically cleared for discharge as they wished to observe post CT removal, po meds started. Once JEN determined, need to update pt's DILRomy who has been assisting with dc planning.                    Electronically signed by RADHA Meng on 4/28/2020 at 4:29 PM

## 2020-04-28 NOTE — PLAN OF CARE
removal  Description: Ability to reestablish a normal urinary elimination pattern will improve  Outcome: Met This Shift     Problem: Urinary Elimination:  Goal: Complications related to the disease process, condition or treatment will be avoided or minimized  Description: Complications related to the disease process, condition or treatment will be avoided or minimized  Outcome: Met This Shift

## 2020-04-28 NOTE — PROGRESS NOTES
[address] [place] [address] [facility]  Progress Note    NAME:Fátima Mahoney  1945  DATE:20    FACE TO FACE ENCOUNTER FOR : pneumonia    SUBJECTIVE:  Chief Complaint   Patient presents with    Fatigue     to er via ems from home for complaint of being so weak she can not get out of her chair. this has been ongoing x 1 month. when she gets up, she is falling frequently and hurt her right thigh and left ankle.  Shortness of Breath     x 1 month. also has \"chest discomfort\". seen this am   HAS NO C/O   Ct out    Recurrent right pleural effusion. PROCEDURES PERFORMED:  Bronchoscopy, right video-assisted thoracic surgery. Drainage of pleural effusion, talc pleurodesis. temp better     Review of systems:  As stated above in the chief complaint, otherwise negative.     Medications:  Scheduled Meds:   furosemide  40 mg Oral Daily    spironolactone  100 mg Oral Daily    levoFLOXacin  750 mg Oral Daily    sodium chloride  20 mL Intravenous Once    docusate sodium  100 mg Oral BID    midazolam  1 mg Intravenous Once    lactulose  10 g Oral BID    enoxaparin  40 mg Subcutaneous Daily    sertraline  100 mg Oral Daily    allopurinol  100 mg Oral Daily    nadolol  10 mg Oral Daily    pantoprazole  40 mg Oral BID    gabapentin  100 mg Oral TID    traZODone  100 mg Oral Nightly    cholestyramine light  4 g Oral Daily    levothyroxine  50 mcg Oral QAM AC    zinc sulfate  50 mg Oral Daily    insulin lispro  0-6 Units Subcutaneous TID WC    insulin lispro  0-3 Units Subcutaneous Nightly     Continuous Infusions:   dextrose       PRN Meds:ondansetron, HYDROcodone 5 mg - acetaminophen, sennosides-docusate sodium, acetaminophen **OR** acetaminophen, glucose, dextrose, glucagon (rDNA), dextrose    OBJECTIVE:  BP (!) 143/63   Pulse 71   Temp 97.6 °F (36.4 °C) (Oral)   Resp 20   Ht 5' 5\" (1.651 m)   Wt 244 lb 4 oz (110.8 kg)   SpO2 97%   BMI 40.65 kg/m²   Temp  Av.9 °F

## 2020-04-28 NOTE — PROGRESS NOTES
1 lower leg edema, peripheral pulses intact bilaterally  Neuro:  Cranial nerves II-XII grossly intact; motor and sensory function intact with no focal deficits  Skin:  No rashes, lesions or wounds    DATA:  CBC with Differential:    Lab Results   Component Value Date    WBC 3.0 04/25/2020    RBC 3.58 04/25/2020    HGB 8.8 04/27/2020    HCT 29.7 04/27/2020    PLT 74 04/25/2020    MCV 80.2 04/25/2020    MCH 23.7 04/25/2020    MCHC 29.6 04/25/2020    RDW 19.9 04/25/2020    NRBC 1 04/14/2015    SEGSPCT 54 05/29/2013    LYMPHOPCT 9.6 04/25/2020    MONOPCT 2.6 04/25/2020    MYELOPCT 0.9 04/15/2020    BASOPCT 0.0 04/25/2020    MONOSABS 0.09 04/25/2020    LYMPHSABS 0.30 04/25/2020    EOSABS 0.00 04/25/2020    BASOSABS 0.00 04/25/2020     CMP:    Lab Results   Component Value Date     04/28/2020    K 4.2 04/28/2020     04/28/2020    CO2 23 04/28/2020    BUN 31 04/28/2020    CREATININE 1.0 04/28/2020    GFRAA >60 04/28/2020    LABGLOM 54 04/28/2020    GLUCOSE 176 04/28/2020    GLUCOSE 237 04/30/2012    PROT 5.1 04/28/2020    LABALBU 2.1 04/28/2020    LABALBU 4.0 04/30/2012    CALCIUM 8.5 04/28/2020    BILITOT 0.3 04/28/2020    ALKPHOS 343 04/28/2020    AST 41 04/28/2020    ALT 19 04/28/2020     Magnesium:    Lab Results   Component Value Date    MG 1.8 04/18/2020     Phosphorus:    Lab Results   Component Value Date    PHOS 4.4 04/18/2020     PT/INR:    Lab Results   Component Value Date    PROTIME 13.4 04/17/2020    INR 1.2 04/17/2020     Troponin:    Lab Results   Component Value Date    TROPONINI 0.02 04/14/2020     U/A:    Lab Results   Component Value Date    COLORU Yellow 04/14/2020    PROTEINU Negative 04/14/2020    PHUR 7.0 04/14/2020    WBCUA 0-1 04/14/2020    WBCUA NONE 12/21/2011    RBCUA 0-1 04/14/2020    RBCUA NONE 12/21/2011    BACTERIA RARE 04/14/2020    CLARITYU Clear 04/14/2020    SPECGRAV 1.015 04/14/2020    LEUKOCYTESUR TRACE 04/14/2020    UROBILINOGEN 0.2 04/14/2020    BILIRUBINUR Negative 04/14/2020    BILIRUBINUR NEGATIVE 12/21/2011    BLOODU SMALL 04/14/2020    GLUCOSEU Negative 04/14/2020    GLUCOSEU 500 12/21/2011     ABG:    Lab Results   Component Value Date    PH 7.289 04/20/2020    PCO2 49.5 04/20/2020    PO2 152.8 04/20/2020    HCO3 23.2 04/20/2020    BE -3.5 04/20/2020    O2SAT 98.6 04/20/2020     HgBA1c:    Lab Results   Component Value Date    LABA1C 5.5 04/14/2020     FLP:    Lab Results   Component Value Date    TRIG 89 09/07/2019    HDL 33 09/07/2019    LDLCALC 61 09/07/2019    LABVLDL 18 09/07/2019     TSH:    Lab Results   Component Value Date    TSH 8.430 04/14/2020     IRON:    Lab Results   Component Value Date    IRON 45 10/02/2019     LIPASE:    Lab Results   Component Value Date    LIPASE 84 03/29/2015       ASSESSMENT AND PLAN:      Patient Active Problem List    Diagnosis Date Noted    [facility] 04/13/2015     Priority: High    Upper GI bleed 03/28/2015     Priority: High    DVT (deep venous thrombosis) ([facility] 04/13/2015     Priority: Medium    Normocytic anemia 03/28/2015     Priority: Medium    Obesity 03/28/2015     Priority: Medium    Hypothyroidism      Priority: Medium    Hypertension      Priority: Medium    Type 2 diabetes mellitus with other specified complication ([facility]      Priority: Medium    Gout      Priority: Medium    Red blood cell antibody positive 04/15/2020    Iron deficiency anemia 04/15/2020    Recurrent right pleural effusion 04/15/2020    Acute respiratory failure with hypoxia (HCC)     Suspected COVID-19 virus infection     Pneumonia of left lung due to infectious organism     Pancytopenia ([facility]     Hyperlipidemia     Rhabdomyolysis 04/14/2020    Calculus of gallbladder with acute on chronic cholecystitis without obstruction 04/12/2019     1. Acute hypoxic respiratory failure  2. Large right pleural [id number]-[id number]-uqdxay post VATS on 4/17/2020  3. Community-acquired pneumonia  4. Pancytopenia  5.   Bernette Ally liver cirrhosis with history of esophageal varices  6. Non-insulin-dependent diabetes mellitus type 2  7. History of DVT with IVC filter in place  8. History of hypothyroidism  9. Chronic kidney disease stage III    Plan:    IV fluids stopped  Glucoscans 4 times daily with sliding scale insulin  Cardiology, thoracic surgery, GI,ID, pulmonary consulted  Routine labs in a.m.   COVID-19 ordered today with patient going to nursing home    Marilu Nathan D.O.  4/28/2020  11:29 AM

## 2020-04-28 NOTE — PROGRESS NOTES
4/17/2020    FIBEROPTIC BRONCHOSCOPY, RIGHT VIDEO ASSISTED THORACOSCOPY DECORTACATION AND PLEURIDESIS performed by Tomy Davis MD at [address]       Precautions: falls and O2, 2 Liters of o2 via nasal cannula, low air loss bed,     SUBJECTIVE:    Social history: Patient lives with spouse in a ranch home with Ramp  to enter Walk in shower grab bars    Equipment owned: Rollator, Quad cane, shower bench, raised toilet seat    AM-PAC Basic Mobility    AM-PAC Mobility Inpatient   How much difficulty turning over in bed?: A Lot  How much difficulty sitting down on / standing up from a chair with arms?: A Lot  How much difficulty moving from lying on back to sitting on side of bed?: A Lot  How much help from another person moving to and from a bed to a chair?: A Lot  How much help from another person needed to walk in hospital room?: A Lot  How much help from another person for climbing 3-5 steps with a railing?: Total  AM-PAC Inpatient Mobility Raw Score : 11  AM-PAC Inpatient T-Scale Score : 33.86  Mobility Inpatient CMS 0-100% Score: 72.57  Mobility Inpatient CMS G-Code Modifier : CL    Nursing cleared patient for PT treatment. Pt needing much encouragement to participate. Pt c/o pain with all movements. OBJECTIVE:   Initial Evaluation  Date: 4/15/20 Treatment Date:  4/28/2020   Short Term/ Long Term   Goals   Was pt agreeable to Eval/treatment? Yes   Yes To be met in 3 days   Pain level   0/10    Pt c/o overall pain with no number assigned.      Bed Mobility  Rolling: Maximal assist of 1    Supine to sit: Maximal assist of 1    Sit to supine: Maximal assist of  2  Pt had episode of panic upon laying down and required max a of 2 for safety d/t sliding anteriorly  Scooting: Maximal assist of 1  Rolling: Not assessed    Supine to sit: Not assessed    Sit to supine: Not assessed    Scooting: Not assessed   Rolling: Minimal assist of 1    Supine to sit: Minimal assist of 1    Sit to supine:

## 2020-04-28 NOTE — PROGRESS NOTES
PROGRESS NOTE    By Nahid Tran D.O GI Fellow    [facility]  Dr. Howard Colindres MD, Dr. Saturnino Cleary MD, Dr Mendoza Gonzalez, Dr. Alycia Stallings MD, Dr. Con Foote, DO      Miguel Butt  69 y.o.  female    SUBJECTIVE: Patient resting comfortably this morning. Chest tube was removed yesterday.    OBJECTIVE:    BP (!) 143/63   Pulse 71   Temp 97.6 °F (36.4 °C) (Oral)   Resp 20   Ht 5' 5\" (1.651 m)   Wt 244 lb 4 oz (110.8 kg)   SpO2 97%   BMI 40.65 kg/m²     Gen: NAD, AAO x 3  HEENT:PEERL, no icterus  Heart: RRR, no M/R/G  Lungs: CTAB   Abd.: soft, NT, ND, BS +, large pannus   Extr.: no C/C/E, no bruising      Stool (measured) : 0 mL  Lab Results   Component Value Date    WBC 3.0 04/25/2020    WBC 3.4 04/23/2020    WBC 5.2 04/20/2020    HGB 8.8 04/27/2020    HGB 8.5 04/25/2020    HGB 8.6 04/23/2020    HCT 29.7 04/27/2020    MCV 80.2 04/25/2020    RDW 19.9 04/25/2020    PLT 74 04/25/2020    PLT 87 04/23/2020    PLT 88 04/20/2020     Lab Results   Component Value Date     04/28/2020    K 4.2 04/28/2020     04/28/2020    CO2 23 04/28/2020    BUN 31 04/28/2020    CREATININE 1.0 04/28/2020    CALCIUM 8.5 04/28/2020    PROT 5.1 04/28/2020    LABALBU 2.1 04/28/2020    LABALBU 4.0 04/30/2012    BILITOT 0.3 04/28/2020    BILITOT 0.5 04/25/2020    BILITOT 0.6 04/22/2020    ALKPHOS 343 04/28/2020    ALKPHOS 298 04/25/2020    ALKPHOS 250 04/22/2020    AST 41 04/28/2020    AST 48 04/25/2020    AST 31 04/22/2020    ALT 19 04/28/2020    ALT 23 04/25/2020    ALT 16 04/22/2020     Lab Results   Component Value Date    LIPASE 84 03/29/2015    LIPASE 129 03/28/2015    LIPASE 91 04/26/2011     Lab Results   Component Value Date    AMYLASE 58 03/28/2015         ASSESSMENT/PLAN:    1. 1. Compensated Cirrhosis secondary to HERNANDEZ   - Serology negative in the past for alternative etiologies   - MELD-NA 7  <2% 90 day mortality   - Hgb at baseline, no GI bleed on exam   - Most recent EGD in the

## 2020-04-29 NOTE — CARE COORDINATION
4-29- Cm note: Per Annalisa Daniels pt refusing to go to [facility] at discharge . Will cont with dc plan for [facility].  Electronically signed by Gris Clark RN on 4/29/2020 at 10:52 AM

## 2020-04-29 NOTE — PROGRESS NOTES
office  - RUQ negative for mass AFP 3     - Continue with  Laculose 10g BID to pts regimen          2. Recurrent Right Sided Pleural Effusion   - Pt with CTA chest in September 2019 and repeat on April 2020 patient with recurrent right-sided pleural effusion  - Concern for hepatic hydrothorax  - Recent Echo EF 71%   -Abdominal US with RUQ ascites not amenable to a paracentetics   -Chest tube was removed   -Diuretics were increased patient tolerating  -Continue with low-sodium diet     Esophagram was reviewed no evidence of stricture possible esophageal spasm. We will continue to monitor. Patient denies more dysphasia overnight. Patient okay to DC from GI point of view          Pt was discussed with  Dr. Octavia Ricks DO  GI Fellow   4/29/2020  9:31 AM    Pt seen and independently examined. Pertinent notes and lab work reviewed. Monitor reviewed showing sinus rhythm. Esophagogram -images reviewed/report read -no obstruction but possible spasm. Consider outpatient manometry. D/w Dr. Franc Nolan. Agree with physical exam and A&P. Okay to be discharged from GI POV with follow-up in the office in 2-3 weeks. Patient to call office to confirm appointment and with questions/concerns at [phone]. Discussed with patient - all questions answered - agreeable with the plan as delineated. Thank you for the opportunity to participate in the care of .  Alonso Kwan MD  4/29/2020  11:19 AM

## 2020-04-29 NOTE — PLAN OF CARE
Problem: Falls - Risk of:  Goal: Will remain free from falls  Description: Will remain free from falls  4/29/2020 1712 by Park Lima RN  Outcome: Met This Shift     Problem: Falls - Risk of:  Goal: Absence of physical injury  Description: Absence of physical injury  4/29/2020 1712 by Park Lima RN  Outcome: Met This Shift

## 2020-04-29 NOTE — PROGRESS NOTES
Occupational Therapy  OT BEDSIDE TREATMENT NOTE      Date:2020  Patient Name: Deysi Gutierrez  MRN: [id number]  : 1945  Room: [address] [place] [address]     Referring Tete Moreno MD  Evaluating OT: Padma Lemon OTR/L [id number]     Placement Recommendation: Subacute    Recommended Adaptive Equipment: TBD at rehab     AM-PAC Daily Activity Inpatient   How much help for putting on and taking off regular lower body clothing?: Total  How much help for Bathing?: A Lot  How much help for Toileting?: Total  How much help for putting on and taking off regular upper body clothing?: A Lot  How much help for taking care of personal grooming?: A Lot  How much help for eating meals?: A Lot  AM-PAC Inpatient Daily Activity Raw Score: 10  AM-PAC Inpatient ADL T-Scale Score : 27.31  ADL Inpatient CMS 0-100% Score: 74.7  ADL Inpatient CMS G-Code Modifier : CL     Diagnosis:   1. Non-traumatic rhabdomyolysis    2. Pancytopenia ([facility] [place] [address]    3. Suspected COVID-19 virus infection       Pertinent Medical History:   Past Medical History           Past Medical History:   Diagnosis Date    Blood transfusion      Gout      Hx of blood clots       right knee    Hyperlipidemia      Hypertension      Hypothyroidism      Morbid obesity (HCC)      Non-insulin dependent type 2 diabetes mellitus (HCC)           Precautions:  falls, low air loss bed   Pain Scale: Numeric Rate: c/o pain at site where chest tube was removed; Nursing notified.         Social history: with family : spouse     Home architecture: single family home, 1 story, ramp to enter, walk in shower.    PLOF: Needs assistance with BADL and IADL, ambulated with wheeled walker short distances   Equipment owned: quad cane, wheeled walker, tub bench, elevated toilet seat, grab bars, rollator   Cognition: oriented x 4; follows 3 step directions.               good  Problem solving skills              good  Memory               good  Sequencing  Communication: intact   Visual perceptual skills: intact                Glasses: yes   Edema: no     Sensation: intact   Hand Dominance:  Left     X  Right       Left Right Comment   Passive range of motion [facility]      Active range of motion [facility]  WFL      Muscle Grade 3/5 3/5     /pinch Strength Intact  Intact      Additional Information: Good  and wfl FMC/dexterity noted during ADL tasks Good  and wfl FMC/dexterity noted during ADL tasks      *Pt declined ROM ex's at this time d/t fatigue     Functional Assessment:    Initial Eval Status  Date: 4/16/2020 Treatment Status Date: 4/29/2020 STGs = LTGs  Time frame: 5-7 days   Feeding Independent  N/T Independent    Grooming Maximal Assist to comb hair x 2 reps, don/doff shampoo cap and towel dry hair  Minimal Assist  Unilateral and bilateral tasks sitting EOB, assist for maintaining balance Independent    UB Dressing Moderate Assist  Moderate Assist  Don/doff gown while supine Independent    LB Dressing Dependent  Dep to don/doff socks Minimal Assist    Bathing Maximal Assist to sponge bathe at EOB NT Minimal Assist    Toileting Maximal Assist for hygiene  Maximal Assist  Due to bowel incontinence  Completed hygiene via rolling. Minimal Assist    Bed Mobility  Supine to sit: Maximal Assist x 2  Scooting:Maximal Assist x 2  Sit to supine: Maximal Assist x 2  Max A x 2 for supine to sit; max A x 2 for scooting; max A x 2 for sit to supine; scoot to [address] with max A x 2 with bed in trendelenburg position; side rolling at max A x 2 for adjustment of linens Supine to sit: Supervision   Sit to supine: Supervision    Functional Transfers Maximal Assist x 2 from EOB to wheeled walker NT   Pt declined Supervision    Functional Mobility Maximal Assist x 2 with wheeled walker, 4 side steps towards head of bed  NT Supervision with wheeled walker    Activity Tolerance Fair   Fair/fair minus Good          Comments:   Nursing approved therapy session.  Upon arrival, patient supine in bed and agreeable to OT session with PT collaboration. Therapist educated pt on role of OT. At end of session, patient supine in bed with call light and phone within reach, all lines and tubes intact; nursing aware. Pt demonstrated fair+ understanding of education/techniques and decreased independence and safety during completion of ADL/functional transfer/mobility tasks. Pt would benefit from continued skilled OT to increase safety and independence with completion of ADL/IADL tasks for functional independence and quality of life. Pt has made fair+ progress towards set goals. Continue with current plan of care. Treatment:  Skilled occupational therapy services provided include instruction/training on safety and adapted techniques for completion of therapeutic activities, ADLs/IADLs. Therapist facilitated bed mobility, graded functional activities (static/dynamic sitting, functional reaching), and functional ambulation - providing mod cuing (verbal, visual, tactile) on hand placement, body mechanics, posture, breathing techniques, energy conservation, compensatory strategies, and safety. Therapist facilitated self-care retraining: UB dressing, LB dressing, grooming, toileting tasks - providing mod cuing (verbal, visual, tactile) on body mechanics, posture, breathing techniques, energy conservation, compensatory strategies, and safety. Therapist educated pt on compensatory strategies/energy conservation techniques to safely complete ADLs/IADLs. Skilled monitoring of O2 sats, HR, and pt response throughout treatment.       Treatment Time CS:0825         Treatment Time Out: 7354               Treatment Charges: Mins Units   Ther Ex  [id number]     Manual Therapy Elieen Richardson [phone] [id number]   ADL/Home Mgt [id number] [phone]   Neuro Re-ed [id number]     Group Therapy      Orthotic manage/training  [id number]     Non-Billable Time     Total Timed Treatment [id number], [place] #[id number]

## 2020-04-29 NOTE — PROGRESS NOTES
4/17/2020    FIBEROPTIC BRONCHOSCOPY, RIGHT VIDEO ASSISTED THORACOSCOPY DECORTACATION AND PLEURIDESIS performed by Lesly Woodall MD at [facility]       Precautions: falls and O2, 2 Liters of o2 via nasal cannula, low air loss bed,     SUBJECTIVE:    Social history: Patient lives with spouse in a ranch home with Ramp  to enter Walk in shower grab bars    Equipment owned: Rollator, Quad cane, shower bench, raised toilet seat    AM-PAC Basic Mobility    AM-PAC Mobility Inpatient   How much difficulty turning over in bed?: A Lot  How much difficulty sitting down on / standing up from a chair with arms?: A Lot  How much difficulty moving from lying on back to sitting on side of bed?: A Lot  How much help from another person moving to and from a bed to a chair?: A Lot  How much help from another person needed to walk in hospital room?: A Lot  How much help from another person for climbing 3-5 steps with a railing?: Total  AM-PAC Inpatient Mobility Raw Score : 11  AM-PAC Inpatient T-Scale Score : 33.86  Mobility Inpatient CMS 0-100% Score: 72.57  Mobility Inpatient CMS G-Code Modifier : CL    Nursing cleared patient for PT treatment. Pt declined OOB activities , only agreeable for ex;s    OBJECTIVE:   Initial Evaluation  Date: 4/15/20 Treatment Date:  4/29/2020   Short Term/ Long Term   Goals   Was pt agreeable to Eval/treatment? Yes   Yes To be met in 3 days   Pain level   0/10     c/o fatigue only no number assigned.      Bed Mobility  Rolling: Maximal assist of 1    Supine to sit: Maximal assist of 1    Sit to supine: Maximal assist of  2  Pt had episode of panic upon laying down and required max a of 2 for safety d/t sliding anteriorly  Scooting: Maximal assist of 1  Rolling: Not assessed    Supine to sit: Not assessed    Sit to supine: Not assessed    Scooting: Not assessed   Rolling: Minimal assist of 1    Supine to sit: Minimal assist of 1    Sit to supine: Minimal assist of 1    Scooting:

## 2020-04-29 NOTE — PROGRESS NOTES
Reason for exam:->DVT; no anticoagulation; CP and SOB FINDINGS: Pulmonary Arteries: Pulmonary arteries are adequately opacified for evaluation. No evidence of intraluminal filling defect to suggest pulmonary embolism. Main pulmonary artery is normal in caliber. Mediastinum: Aortic and coronary atherosclerosis. The thyroid is within normal limits. No pericardial effusion. The esophagus is within normal limits. No mediastinal adenopathy. Lungs/pleura: Moderate to large right pleural effusion. Trace left pleural effusion. Compressive atelectasis at the lung bases, without definite consolidation. Focal patchy ground-glass opacity in the left upper lobe as well as smaller, more peripheral ground-glass opacities in the left upper lobe. The central airways are grossly patent. Upper Abdomen: Splenomegaly. Small amount of ascites. The liver is mildly cirrhotic in appearance. Soft Tissues/Bones: No acute bone or soft tissue abnormality. 1. No evidence of pulmonary embolism. 2. New large patchy ground-glass opacity in the left upper lobe as well as smaller, more peripheral ground-glass opacities in the lingula. The differential favors an infectious process including viral and atypical pneumonia. 3. Moderate to large right pleural effusion. 4. Cirrhotic appearance of the liver with splenomegaly and a small amount of ascites. Us Abdomen Limited    Result Date: 4/15/2020  EXAMINATION: RIGHT UPPER QUADRANT ULTRASOUND 4/15/2020 4:44 pm COMPARISON: 08/13/2018 HISTORY: ORDERING SYSTEM PROVIDED HISTORY: RUQ:  rule out hepatic mass and abdominal for ascites  PROVIDED HISTORY: Reason for exam:->RUQ:  rule out hepatic mass and abdominal for ascites survery Reason for exam:->Abdominal US for ascites survery FINDINGS: LIVER:  There is diffusely increased abnormal echogenicity throughout the liver suggestive of hepatocellular disease.   As result there is decreased through transmission of sound which

## 2020-04-29 NOTE — PROGRESS NOTES
lines were intact, and nursing was notified. ASSESSMENT:   Pt able to sit with Supervision using her RUE on the bed rail for support and maintaining mid-line position. Pt verbally expresses pain with all movements. I assisted the pt with pulling her back further on the bed to ease her anxiety about sliding off of the bed and falling. Pt needed reassurance throughout tx session about her safety and we were not going to let her fall. Patient would benefit from continued skilled Physical Therapy to improve functional independence and quality of life. PLAN:    Patient is making fair progress towards established goals, will continue with current plan of care. Time in: 10:42  Time out: 11:20    Total Treatment Time:  38 minutes    CPT codes:  Therapeutic activities (79754)   38 minutes  3 unit(s)    Harvinder An  [facility]  LIC # [id number]

## 2020-04-29 NOTE — PROGRESS NOTES
[address] [place] [address] [facility]  Progress Note    NAME:Fátima Weinstein April 1945  DATE:04/29/20    FACE TO FACE ENCOUNTER FOR : pneumonia  SUBJECTIVE:  Chief Complaint   Patient presents with    Fatigue     to er via ems from home for complaint of being so weak she can not get out of her chair. this has been ongoing x 1 month. when she gets up, she is falling frequently and hurt her right thigh and left ankle.  Shortness of Breath     x 1 month. also has \"chest discomfort\". HAS NO C/O - resting in bed-  Eating lunch- no distress. Recurrent right pleural effusion. PROCEDURES PERFORMED:  Bronchoscopy, right video-assisted thoracic surgery. Drainage of pleural effusion, talc pleurodesis. temp better     Review of systems:  As stated above in the chief complaint, otherwise negative.     Medications:  Scheduled Meds:   furosemide  40 mg Oral Daily    spironolactone  100 mg Oral Daily    levoFLOXacin  750 mg Oral Daily    sodium chloride  20 mL Intravenous Once    docusate sodium  100 mg Oral BID    midazolam  1 mg Intravenous Once    lactulose  10 g Oral BID    enoxaparin  40 mg Subcutaneous Daily    sertraline  100 mg Oral Daily    allopurinol  100 mg Oral Daily    nadolol  10 mg Oral Daily    pantoprazole  40 mg Oral BID    gabapentin  100 mg Oral TID    traZODone  100 mg Oral Nightly    cholestyramine light  4 g Oral Daily    levothyroxine  50 mcg Oral QAM AC    zinc sulfate  50 mg Oral Daily    insulin lispro  0-6 Units Subcutaneous TID WC    insulin lispro  0-3 Units Subcutaneous Nightly     Continuous Infusions:   dextrose       PRN Meds:calcium carbonate, ondansetron, HYDROcodone 5 mg - acetaminophen, sennosides-docusate sodium, acetaminophen **OR** acetaminophen, glucose, dextrose, glucagon (rDNA), dextrose    OBJECTIVE:  BP (!) 122/56   Pulse 71   Temp 97.6 °F (36.4 °C) (Oral)   Resp 20   Ht 5' 5\" (1.651 m)   Wt 244 lb 4 oz (110.8 kg)   SpO2 100% BMI 40.65 kg/m²   Temp  Av.6 °F (36.4 °C)  Min: 97.4 °F (36.3 °C)  Max: 98 °F (36.7 °C)  Constitutional:  The patient is awake . Pale. Eating lunch- feeling ok this am  Skin:      No rashes were noted. HEENT:    AT/NC  Chest:    On o2 dec bs ant   - on nasal canula. Cardiovascular:  S1 and S2 are rhythmic and regular. Abdomen:   Positive bowel sounds to auscultation. Benign to palpation soft INC BMI  Extremities:   No clubbing, no cyanosis,  edema. CNS    AAxO  Psych flat    Lines:  piv    Radiology:   Patchy airspace opacities bilaterally, may be related to pulmonary edema  versus pneumonia, improved the right lung apex, mildly increased at the right  lung base. Minimal bilateral pleural effusions. Stable right-sided chest tube.  No definite pneumothorax. Stable mild cardiomegaly.     Laboratory and Tests Review:  Lab Results   Component Value Date    WBC 3.0 (L) 2020    WBC 3.4 (L) 2020    WBC 5.2 2020    HGB 8.6 (L) 2020    HCT 29.6 (L) 2020    MCV 80.2 2020    PLT 74 (L) 2020     No results found for: [facility]  Lab Results   Component Value Date    ALT 19 2020    AST 41 (H) 2020    ALKPHOS 343 (H) 2020    BILITOT 0.3 2020     Lab Results   Component Value Date     2020    K 4.2 2020     2020    CO2 23 2020    BUN 31 2020    CREATININE 1.0 2020    CREATININE 1.0 2020    CREATININE 1.0 2020    GFRAA >60 2020    LABGLOM 54 2020    GLUCOSE 176 2020    GLUCOSE 237 2012    PROT 5.1 2020    LABALBU 2.1 2020    LABALBU 4.0 2012    CALCIUM 8.5 2020    BILITOT 0.3 2020    ALKPHOS 343 2020    AST 41 2020    ALT 19 2020     Lab Results   Component Value Date    CRP 1.4 (H) 2020     Lab Results   Component Value Date    SEDRATE 54 (H) 2020    SEDRATE 40 (H) 2013       Microbiology:

## 2020-04-29 NOTE — PROGRESS NOTES
rate 18 with blood pressure 129/62. Patient currently on 4 L nasal cannula with O2 sat range %. Serum creatinine went up to 1.2 today with a BUN of 23. Blood sugars ranging 132-159. Hemoglobin is 8.6 and WBC is 6.0 and a platelet count 94. .    4/20/2020  Patient was seen and examined on telemetry floor. Temperature 97.6 with a heart rate of 71. Patient is sleepy again today. The patient has postop discomfort. Patient denies any abdominal pain, nausea or vomiting. Patient has a moist nonproductive cough off and on. Blood pressure currently 140/65. O2 sat ranged from 91-95% on 4 L nasal cannula. Urine output 125- 200 cc a shift. Oral intake is poor. BUN/creatinine is 22/1.1. Blood sugars ranging from 145-231. REN globin is 8.7 with a WBC of 5.2.    4/21/2020  Patient was seen and examined on telemetry floor. Patient has postop discomfort. Patient O2 nasal cannula has been increased over the past 24 hours. She denies any abdominal pain or nausea. Blood sugars range 143-182. Temperature is 97.5 with a heart rate is 70 and respiratory rate 20. Blood pressure 132/60. O2 sats 99% on 7 L nasal cannula. Oral intake is poor with patient is urine output ranging 150-275 cc a shift. 4/22/2020  Patient was seen and examined on telemetry floor. Patient has expected postop discomfort. She denies any other chest discomfort, abdominal pain. Patient has a weak nonproductive cough more prominent recently. Blood sugars range from 143-201. BUN/creatinine 26/0.9. Serum albumin is 2.0. Blood pressure ranges 128//63. Temperature is 97.6 with a heart rate of 64. Patient currently on 6 L nasal cannula with O2 sat 96%. Urine output is good. 4/23/2020  Patient was seen and examined on [address]. Patient looks very very tired and weak but states that she is doing better. She denies any current left-sided chest pain or abdominal pain or nausea vomiting.   Temperatures 97.9 with a heart rate of 60 with respiratory rate of 18. Patient currently on 4 L nasal cannula with an O2 sat of 98% at rest.  Urinary output is adequate. Oral intake is fair-poor. BUN/creatinine is 37/0.9. Blood sugars range 151-168. Hemoglobin is 8.6 with WBC at 3.4. Platelet count is still 87.    4/24/2020  Patient was seen and examined on [facility]. Patient has postop chest discomfort but it is as expected. Patient denies any other chest pain or abdominal pain. Patient still very very weak. Patient states she wants to go home. Blood sugars range 102- 163. Blood pressure 120/56 with a heart rate of 65 and temperature 97.5. Urinary output is adequate. Oral intake is poor-fair. Patient's O2 sat is 96% on 2 L nasal cannula. 4/25/2020  Patient was seen and examined on [facility]. Patient still very weak and tired and still adamant about going home instead of extended care facility. She denies any problem with unusual chest pain besides for chest tubes are, abdominal pain, nausea or vomiting. Blood sugars range 130-150. Hemoglobin is 8.5 WBC 3.0. Temperature is 96.4 with a heart rate of 68. Blood pressure 134/60. O2 sats 98% on 2 L nasal cannula. Oral intake is fair. Chest tube still putting out 250-500 cc.    4/26/2020  Patient was seen and examined on [facility]. Patient denies any unusual shortness of breath, midsternal chest pain, abdominal pain, nausea or vomiting. Patient has discomfort around chest tube site. Patient still very weak but patient refusing to go to extended care facility.  disease note reviewed with patient still having significant drainage from chest tubes. Blood sugars range 130-158. Temperature 98. 6. Heart rate 74 with blood pressure currently 129/58. O2 sats 90% on 2 L.    4/27/2020  Patient was seen and examined on [facility]. Patient had chest tube pulled today by Dr. Geovani Marcano.  Patient states she feels better. Patient does still very weak. Blood sugars ranging from 125-158. Hemoglobin is 8.8 today. Provider   colestipol (COLESTID) 1 g tablet Take 1 g by mouth 2 times daily   Yes Historical Provider, MD   levothyroxine (SYNTHROID) 50 MCG tablet Take 50 mcg by mouth every morning (before breakfast)   Yes Historical Provider, MD   metFORMIN (GLUCOPHAGE) 500 MG tablet Take 500 mg by mouth daily (with breakfast)   Yes Historical Provider, MD   zinc sulfate (ORAZINC) 220 (50 Zn) MG capsule Take 1 capsule by mouth daily 10/16/19 10/15/20 Yes Iam Galvan MD   gabapentin (NEURONTIN) 100 MG capsule Take 100 mg by mouth 3 times daily. 3/28/19  Yes Historical Provider, MD   traZODone (DESYREL) 50 MG tablet Take 100 mg by mouth nightly  3/27/19  Yes Historical Provider, MD   furosemide (LASIX) 20 MG tablet Take 20 mg by mouth every morning    Yes Historical Provider, MD   pantoprazole (PROTONIX) 40 MG tablet Take 1 tablet by mouth 2 times daily 5/16/17  Yes Sirisha Garland DO   rosuvastatin (CRESTOR) 10 MG tablet Take 10 mg by mouth daily   Yes Historical Provider, MD   nadolol (CORGARD) 20 MG tablet Take 0.5 tablets by mouth daily. 4/17/15  Yes Sirisha Garland DO   sertraline (ZOLOFT) 50 MG tablet Take 100 mg by mouth daily    Yes Historical Provider, MD   allopurinol (ZYLOPRIM) 100 MG tablet Take 100 mg by mouth daily. Yes Historical Provider, MD   zinc sulfate (ORAZINC) 220 (50 Zn) MG capsule Take 4 capsules by mouth daily 4/15/20 5/15/20  MARIJA Jimenez - CNP   influenza virus trivalent vaccine (FLUZONE) injection Inject 0.5 mLs into the muscle once Given 1/2020    Historical Provider, MD       Allergies:  Patient has no known allergies.     Social History:   Social History     Socioeconomic History    Marital status:      Spouse name: Not on file    Number of children: Not on file    Years of education: Not on file    Highest education level: Not on file   Occupational History    Not on file   Social Needs    Financial resource strain: Not on file    Food insecurity     Worry: Not on file Hyperlipidemia     Rhabdomyolysis 04/14/2020    Calculus of gallbladder with acute on chronic cholecystitis without obstruction 04/12/2019     1. Acute hypoxic respiratory failure  2. Large right pleural uijfgiwe-lwoynxzsdckys-aaikax post VATS on 4/17/2020  3. Community-acquired pneumonia  4. Pancytopenia  5. Lawanda Ee liver cirrhosis with history of esophageal varices  6. Non-insulin-dependent diabetes mellitus type 2  7. History of DVT with IVC filter in place  8. History of hypothyroidism  9. Chronic kidney disease stage III    Plan:    IV fluids stopped  Glucoscans 4 times daily with sliding scale insulin  Times 3 times daily as needed for heartburn  Continue Protonix 40 twice daily  Cardiology, thoracic surgery, GI,ID, pulmonary consulted  Routine labs in a.m. COVID-19 ordered today with patient going to nursing home       consult for discharge planning with patient finally excepting to go to temporary extended care facility for rehab and medical treatment.   Karlo San D.O.  4/29/2020  10:20 AM

## 2020-04-29 NOTE — CARE COORDINATION
SS NOTE: 2 COVID negative tests completed. Pt DOES NOT want to go to [facility] for SNF. She [address], [phone]. SW spoke with Vinh Wray in admissions at [facility] and they will accept pt for admission in the AM Thursday. They will accept an 11:00am discharge for tomorrow if medically stable for dch. For the SNF has the [address], he will need a signed LINDA, current PT/OT note and PEEWEE will complete the HENs. Frandy Krishnan. 4/29/2020.4:22 PM.

## 2020-04-29 NOTE — DISCHARGE INSTR - COC
Continuity of Care Form    Patient Name: Kj Lay   :  1945  MRN:  [id number]    Admit date:  2020  Discharge date:  2020    Code Status Order: Full Code   Advance Directives:   Advance Care Flowsheet Documentation     Date/Time Healthcare Directive Type of Healthcare Directive Copy in [address] Agent's Name Healthcare Agent's Phone Number    [phone]  Yes, patient has an advance directive for healthcare treatment  Durable power of  for health care  No, copy requested from family  --  --  --          Admitting Physician:  Lenore Christian DO  PCP: Efrain Rico DO    Discharging Nurse: [facility] Unit/Room#: /8-39  Discharging Unit Phone Number: [phone]    Emergency Contact:   Extended Emergency Contact Information  Primary Emergency Contact: Daryl Steele  Address: [address] [email or web address] [address] Phone: [phone]  Mobile Phone: [phone]  Relation: Child  Secondary Emergency Contact: Ko Steele  Address: [facility] [address]. [address] [place] [address] Phone: [phone]  Mobile Phone: [phone]  Relation: Child   needed?  No    Past Surgical History:  Past Surgical History:   Procedure Laterality Date    ENDOSCOPY, COLON, DIAGNOSTIC      HERNIA REPAIR      umbillical    KNEE ARTHROSCOPY  2012    right knee arthroscopy    OTHER SURGICAL HISTORY N/A 4-13-15    push enteroscopy    OTHER SURGICAL HISTORY      edd filter      2 months    THORACOSCOPY Right 2020    FIBEROPTIC BRONCHOSCOPY, RIGHT VIDEO ASSISTED THORACOSCOPY DECORTACATION AND PLEURIDESIS performed by Domonique Cid MD at [address]         Immunization History:   Immunization History   Administered Date(s) Administered    Pneumococcal Polysaccharide ([id number]) 2015       Active Problems:  Patient Active Problem List Electronically signed by Malou Lopez RN on 4/30/20 at 1:10 PM EDT    CASE MANAGEMENT/SOCIAL WORK SECTION    Inpatient Status Date: ***    Readmission Risk Assessment Score:  Readmission Risk              Risk of Unplanned Readmission:        23           Discharging to Facility/ Agency   · Name: [facility]  · Address:  · [phone]  · [phone]    Dialysis Facility (if applicable)   · Name:  · Address:  · Dialysis Schedule:  · Phone:  · Fax:    / signature: Electronically signed by Levi Dunn on 4/29/20 at 12:33 PM EDT    PHYSICIAN SECTION    Prognosis: Good    Condition at Discharge: Stable    Rehab Potential (if transferring to Rehab): Good    Recommended Labs or Other Treatments After Discharge: ***    Physician Certification: I certify the above information and transfer of Dhiraj Cano  is necessary for the continuing treatment of the diagnosis listed and that she requires [place] for less 30 days.      Update Admission H&P: {CHP DME Changes in SDKBR:[id number]}    PHYSICIAN SIGNATURE:  Electronically signed by Marylu Amos DO on 4/30/20 at 12:25 PM EDT

## 2020-04-29 NOTE — CARE COORDINATION
SS NOTE: 2 COVID negative tests completed. Pt DOES NOT want to go to [facility] for SNF. She [address], [phone]. SW spoke with Viridiana Epstein there and they will begin precert with insurance today. SW did fax the 2 negative COVID test results to Viridiana Epstein today. SW advised pt's DIL, Sula Cockayne of these findings today. For the SNF pt will need a PRECERT. Signed LINDA, current PT/OT note and SW will complete the HENs. Derek Krishnan. 4/29/2020.12:27 PM.

## 2020-04-30 NOTE — PLAN OF CARE
Problem: Falls - Risk of:  Goal: Will remain free from falls  Description: Will remain free from falls  4/30/2020 0126 by Ladan Castle RN  Outcome: Met This Shift     Problem: Falls - Risk of:  Goal: Absence of physical injury  Description: Absence of physical injury  4/30/2020 0126 by Ladan Castle RN  Outcome: Met This Shift     Problem: Discharge Planning:  Goal: Discharged to appropriate level of care  Description: Discharged to appropriate level of care  Outcome: Met This Shift     Problem: Discharge Planning:  Goal: Participates in care planning  Description: Participates in care planning  Outcome: Met This Shift     Problem: Airway Clearance - Ineffective:  Goal: Clear lung sounds  Description: Clear lung sounds  Outcome: Met This Shift     Problem: Fluid Volume - Deficit:  Goal: Achieves intake and output within specified parameters  Description: Achieves intake and output within specified parameters  Outcome: Met This Shift     Problem: Gas Exchange - Impaired:  Goal: Levels of oxygenation will improve  Description: Levels of oxygenation will improve  Outcome: Met This Shift

## 2020-04-30 NOTE — PROGRESS NOTES
Physical Therapy    Physical Therapy Treatment Note    Room #:  5369/7228-13  Patient Name: Kj Lay  YOB: 1945  MRN: [id number]    Referring Provider: Chip Magana MD    Date of Service: 4/30/2020    Evaluating Physical Therapist: Sam Campbell PT  Lic.  # [id number]      Diagnosis: Rhabdomyolysis [M62.82]  Rhabdomyolysis [M62.82]        Patient Active Problem List   Diagnosis    Hypothyroidism    Hypertension    Type 2 diabetes mellitus with other specified complication ([facility] [address]    Gout    Upper GI bleed    Normocytic anemia    Obesity    Esophageal varices (HCC)    DVT (deep venous thrombosis) (HCC)    Calculus of gallbladder with acute on chronic cholecystitis without obstruction    Rhabdomyolysis    Red blood cell antibody positive    Iron deficiency anemia    Recurrent right pleural effusion    Acute respiratory failure with hypoxia (HCC)    Suspected COVID-19 virus infection    Pneumonia of left lung due to infectious organism    Pancytopenia ([facility] [facility]    Hyperlipidemia        Tentative placement recommendation: Subacute rehab    Equipment recommendation: Wheelchair, 22-24 inch, foot rest   Prior Level of Function: Patient ambulated with wheeled walker with assist short distance d/t right knee buckling  Rehab Potential: good for goals    Past medical history:   Past Medical History:   Diagnosis Date    Blood transfusion     Gout     Hx of blood clots     right knee    Hyperlipidemia     Hypertension     Hypothyroidism     Morbid obesity ([facility] [facility]     Non-insulin dependent type 2 diabetes mellitus ([facility] [address]      Past Surgical History:   Procedure Laterality Date    ENDOSCOPY, COLON, DIAGNOSTIC      HERNIA REPAIR      umbillical    KNEE ARTHROSCOPY  11/8/2012    right knee arthroscopy    OTHER SURGICAL HISTORY N/A 4-13-15    push enteroscopy    OTHER SURGICAL HISTORY      edd filter      2 months    THORACOSCOPY Right 4/17/2020    FIBEROPTIC BRONCHOSCOPY, RIGHT VIDEO ASSISTED THORACOSCOPY DECORTACATION AND PLEURIDESIS performed by Rodolfo Whitman MD at [address]       Precautions: falls and O2, 2 Liters of o2 via nasal cannula, low air loss bed,     SUBJECTIVE:    Social history: Patient lives with spouse in a ranch home with Ramp  to enter Walk in shower grab bars    Equipment owned: Rollator, Quad cane, shower bench, raised toilet seat    [address]   How much difficulty turning over in bed?: A Lot  How much difficulty sitting down on / standing up from a chair with arms?: A Lot  How much difficulty moving from lying on back to sitting on side of bed?: A Lot  How much help from another person moving to and from a bed to a chair?: A Lot  How much help from another person needed to walk in hospital room?: A Lot  How much help from another person for climbing 3-5 steps with a railing?: Total  AM-PAC Inpatient Mobility Raw Score : 11  AM-PAC Inpatient T-Scale Score : 33.86  Mobility Inpatient CMS 0-100% Score: 72.57  Mobility Inpatient CMS G-Code Modifier : CL    Nursing cleared patient for PT treatment. Pt declined OOB activities , only agreeable for ex. Pt fearful of falling   OBJECTIVE:   Initial Evaluation  Date: 4/15/20 Treatment Date:  4/30/2020   Short Term/ Long Term   Goals   Was pt agreeable to Eval/treatment?  Yes   Yes To be met in 3 days   Pain level   0/10     no number assigned to pain      Bed Mobility  Rolling: Maximal assist of 1    Supine to sit: Maximal assist of 1    Sit to supine: Maximal assist of  2  Pt had episode of panic upon laying down and required max a of 2 for safety d/t sliding anteriorly  Scooting: Maximal assist of 1  Rolling: Not assessed    Supine to sit: Not assessed    Sit to supine: Not assessed    Scooting: Not assessed   Rolling: Minimal assist of 1    Supine to sit: Minimal assist of 1    Sit to supine: Minimal assist of 1    Scooting: Minimal assist of 1    Dangle edge of bed x 30 min independent    Transfers Sit to stand: Not assessed  pending left ankle xray Sit to stand: Not assessed   Sit to stand: Moderate assist of 1  If xray left ankle negative   Ambulation    not assessed   not assessed      25 feet using  wheeled walker with Minimal assist of 1  without knee buckling   ROM Within functional limits        Strength BUE: 3/5 shoulders and triceps; 3+ biceps  RLE:  3/5  LLE:  3/5   Increase strength in affected mm groups by 1/3 grade   Balance Sitting EOB:  fair    Dynamic Standing:  not assessed d/t pending left ankle xray Sitting EOB:  fair   Dynamic Standing:  not assessed    Sitting EOB:  good    Dynamic Standing: fair plus     Patient is Alert & Oriented x person, place, time and situation and follows directions    Patient education  Patient was educated and facilitated on techniques to increase safety and independence with bed mobility, balance, functional transfers, and functional mobility. Patient was explained the the benefits of mobility and risks of immobility. Patient response to education:   Pt verbalized understanding Pt demonstrated skill Pt requires further education in this area    Yes Partial Yes      Treatment: Patient practiced and was instructed in the following treatment:      Patient agreeable to ex's only this pm , pt declined sitting at side of bed . Therapeutic Exercises: ankle pumps, quad sets, glut sets, heel slide, hip abduction/adduction and straight leg raise x 20 reps each AAROM . Increased time to rest and complete ex  At end of session, patient was in bed with bariatric low air loss bed , call light and phone within reach, all lines were intact, and nursing was notified. ASSESSMENT:   Pt with c/o increase fatigue since am . Pt able to tolerate all ex's without increase in c/o. Patient would benefit from continued skilled Physical Therapy to improve functional independence and quality of life.        PLAN:    Patient is making limited progress towards established goals, will continue with current plan of care.       Time in: 848  Time out: 911    Total Treatment Time: 23  minutes    CPT codes:  Therapeutic exercises (34439)   23 minutes  2 unit(s)    Jaja Rodriguez [facility]  LIC# [id number]

## 2020-04-30 NOTE — PROGRESS NOTES
exam:->pleural effusion FINDINGS: There is continued small bilateral pleural effusions with most likely associated atelectasis. There is improved aeration of the right lower lobe when compared to previous. Fluid in the fissure is seen. There is continued but decreased pulmonary vascular congestion. Heart silhouette is enlarged. Trachea is midline. Visualized bony thorax shows no acute abnormality. Continued cardiomegaly with bilateral pleural effusions and pulmonary vascular congestion. Improved aeration of the right lower lobe when compared to previous. Xr Chest Portable    Result Date: 4/24/2020  EXAMINATION: ONE XRAY VIEW OF THE CHEST 4/20/2020 6:57 am COMPARISON: 04/19/2020 HISTORY: ORDERING SYSTEM PROVIDED HISTORY: post op TECHNOLOGIST PROVIDED HISTORY: Reason for exam:->post op Follow-up FINDINGS: Monitor wires overlie the chest.  There is a right internal jugular central venous line with the tip in the superior vena cava. The patient is rotated toward the right. The cardiac silhouette is unremarkable. There is worsening airspace disease in the right lung base with volume loss in the right lung apex and a loculated pleural effusion. There is stable airspace disease in the left lung base. Worsening airspace changes in the right lung with airspace disease in the right lung base and new volume loss and pleural fluid in the right lung apex. Stable airspace disease in left lung base. Follow up to resolution is suggested. Xr Chest Portable    Result Date: 4/24/2020  EXAMINATION: ONE XRAY VIEW OF THE CHEST 4/24/2020 9:34 am COMPARISON: 04/22/2020 HISTORY: ORDERING SYSTEM PROVIDED HISTORY: pleural effusion TECHNOLOGIST PROVIDED HISTORY: Reason for exam:->pleural effusion FINDINGS: There is stable cardiomegaly with pulmonary vascular congestion and interstitial and alveolar edema. There are probable small bilateral pleural effusions.   Bibasilar increased density may represent atelectasis, edema, or superimposed infectious pneumonitis. Stable exam     Xr Chest Portable    Result Date: 4/22/2020  EXAMINATION: ONE XRAY VIEW OF THE CHEST 4/22/2020 6:53 am COMPARISON: Chest radiograph 04/21/2020, chest CT 04/14/2020 HISTORY: ORDERING SYSTEM PROVIDED HISTORY: sitting upright, follow up atelectasis TECHNOLOGIST PROVIDED HISTORY: Reason for exam:->sitting upright, follow up atelectasis FINDINGS: Removal of the right internal jugular central venous catheter. Unchanged large bore right chest tube. Overlying heart monitor leads. Slightly decreased bilateral perihilar airspace opacities. Persistent diffuse interstitial opacities with indistinct pulmonary vasculature and suggestion interlobular septal thickening. Unchanged bibasilar airspace opacities, remaining worse on the right. Persistence of at least trace right pleural effusion. No definite findings of pneumothorax or left pleural effusion. Normal mediastinal contour mildly prominent cardiac contour. 1. Slightly decreased central predominant airspace and interstitial opacities suggestive of improved edema in the setting of congestive heart failure given mild cardiomegaly. Pneumonia could appear similar. 2. Unchanged bibasilar airspace opacities worse on the right likely due primarily to atelectasis, although superimposed pneumonia or aspiration could be present on the right. 3. Persistence of at least trace right pleural effusion. Xr Chest Portable    Result Date: 4/21/2020  EXAMINATION: ONE XRAY VIEW OF THE CHEST 4/21/2020 7:10 am COMPARISON: April 20, 2020 HISTORY: ORDERING SYSTEM PROVIDED HISTORY: post op TECHNOLOGIST PROVIDED HISTORY: Reason for exam:->post op FINDINGS: There is stable right-sided chest tube. There is a right IJ catheter with its tip in the superior vena cava. The cardiomediastinal silhouette is stable. There are patchy airspace opacities bilaterally, may be related to pulmonary edema versus pneumonia.   There is chest tube is in place. No pneumothorax is seen. Right-sided chest tube in place with decreased right pleural effusion when correlated to CT from 3 days earlier. Persistent perihilar infiltrates with left basilar consolidation likely on the basis of pulmonary edema. Ct Ankle Left Wo Contrast    Result Date: 4/16/2020  EXAMINATION: CT OF THE LEFT ANKLE WITHOUT CONTRAST 4/15/2020 4:37 pm TECHNIQUE: CT of the left ankle was performed without the administration of intravenous contrast.  Multiplanar reformatted images are provided for review. Dose modulation, iterative reconstruction, and/or weight based adjustment of the mA/kV was utilized to reduce the radiation dose to as low as reasonably achievable. COMPARISON: None. HISTORY ORDERING SYSTEM PROVIDED HISTORY: questionable nondisplaced fractures on Xray, recent fall TECHNOLOGIST PROVIDED HISTORY: Reason for exam:->questionable nondisplaced fractures on Xray, recent fall FINDINGS: Osteopenia. No acute fracture. Mild irregularity involving the distal fibula and medial malleolus with tiny well corticated bony fragments distal to the medial malleolus. Findings likely related to previous trauma. No joint effusion. Calcaneal spurs. Vascular calcifications. Cystic change at the cuboid navicular joint. Small spurring at the cuboid navicular joint. Mild soft tissue edema. No fracture. Cta Chest W Contrast    Result Date: 4/14/2020  EXAMINATION: CTA OF THE CHEST 4/14/2020 10:19 am TECHNIQUE: CTA of the chest was performed after the administration of intravenous contrast.  Multiplanar reformatted images are provided for review. MIP images are provided for review. Dose modulation, iterative reconstruction, and/or weight based adjustment of the mA/kV was utilized to reduce the radiation dose to as low as reasonably achievable.  COMPARISON: 01/20/2020, 09/29/2019 HISTORY: ORDERING SYSTEM PROVIDED HISTORY: DVT; no anticoagulation; CP and SOB TECHNOLOGIST transmission of sound which decreases sensitivity for underlying parenchymal mass. BILIARY SYSTEM:  Multiple stones identified within the gallbladder. Sonographic Chestine Bogus sign is negative. Common bile duct measures 5 mm. Gallbladder wall thickening measuring 5 mm Common bile duct is within normal limits measuring 5 mm RIGHT KIDNEY: The right kidney is grossly unremarkable without evidence of hydronephrosis. PANCREAS:  Visualized portions of the pancreas are unremarkable. OTHER: There is ascites within the right upper quadrant. Right-sided pleural effusion noted. Ascites within the right upper quadrant and right-sided pleural effusion. Cholelithiasis and gallbladder wall thickening measuring 5 mm. Clinical correlation is strongly recommended. This can be seen in setting of the hypoproteinemic state or volume overload amongst other etiologies. Cholecystitis may be considered differential if there are symptoms of there is right upper quadrant pain or discomfort. Cirrhotic appearance of the liver. Us Dup Lower Extremity Right Aureliano    Result Date: 4/14/2020  EXAMINATION: DUPLEX VENOUS ULTRASOUND OF THE RIGHT LOWER EXTREMITY, 4/14/2020 10:37 am TECHNIQUE: Duplex ultrasound and Doppler images were obtained of the right lower extremity. COMPARISON: None. HISTORY: Reason for exam:->evaluate DVT Pain FINDINGS: The visualized veins of the right lower extremity are patent and free of echogenic thrombus. The veins are normally compressible and have normal phasic flow. No evidence of DVT in the right lower extremity. Fl Modified Barium Swallow W Video    Result Date: 4/22/2020  EXAMINATION: MODIFIED BARIUM SWALLOW WAS PERFORMED IN CONJUNCTION WITH [facility] 04/22/2020 TECHNIQUE: Fluoroscopic evaluation of the swallowing mechanism was performed with multiple consistency of barium product.  FLUOROSCOPY DOSE AND TYPE OR TIME AND EXPOSURES: 0.8 minutes, 3.65 mGy per cm squared COMPARISON: None

## 2020-04-30 NOTE — PROGRESS NOTES
Nurse to nurse called to [facility] from [facility].

## 2020-04-30 NOTE — PLAN OF CARE
Problem: Falls - Risk of:  Goal: Will remain free from falls  Description: Will remain free from falls  4/30/2020 1029 by Isra Malik RN  Outcome: Met This Shift  4/30/2020 0126 by Marianela Simons RN  Outcome: Met This Shift     Problem: Falls - Risk of:  Goal: Absence of physical injury  Description: Absence of physical injury  4/30/2020 1029 by Irsa Malik RN  Outcome: Met This Shift  4/30/2020 0126 by Marianela Simons RN  Outcome: Met This Shift

## 2020-04-30 NOTE — DISCHARGE SUMMARY
Department of Internal Medicine            HISTORY OF PRESENT ILLNESS:      The patient is a 69 y.o. female who presents with chronic right thigh pain is been present for about 5 months with increased weakness and falling. Patient was having increased shortness of breath when laying down. Patient also has chronic bilateral lower extremity edema. Patient initially denied any fever and chills or cough. CT chest done in the ED showed moderate to large right pleural effusion with a large patchy groundglass opacity in left upper lobe as well is a smaller peripheral groundglass opacity in lingula. The patient initially was worked up for [place] [address] and was given to the [facility] hospitalist group. Patient was consult with cardiology, pulmonary, GI and vascular surgery for a VATS procedure. Patient was cleared by cardiology and GI and pulmonary and was set up for surgery today. Patient currently denies any chest pain or abdominal pain. Patient's temperature is been normal throughout hospitalization with current heart rate of 68 and sinus rhythm respiratory rate of 20 and blood pressure currently 148/67. O2 saturation 96% on 1 L nasal cannula. Patient blood sugars range 107- 130 off of her Glucophage. WBC is 2.3 with hemoglobin 7.1. Platelet count is 80. Patient was ordered 2 units of packed RBCs to be transfused this morning. 4/18/2020  Patient was seen and examined on telemetry floor. Case discussed with Dr. Celestine Garcia today. Patient doing very well and has postop discomfort. Blood sugars ranging from 116-165. Hemoglobin is 8.8 with a WBC of 5.2 and platelet count 88. Temperature 97.3 with a heart rate of 70. Blood pressure currently 145/63 with O2 sat in 99%. Urine output is fairly good. 4/19/2020  Patient was seen and examined on telemetry floor. Patient very sleepy today. She complains of spasm around surgical site but otherwise she feels fairly good. Temperature 97.8 with a heart rate of 68.   Respiratory with respiratory rate of 18. Patient currently on 4 L nasal cannula with an O2 sat of 98% at rest.  Urinary output is adequate. Oral intake is fair-poor. BUN/creatinine is 37/0.9. Blood sugars range 151-168. Hemoglobin is 8.6 with WBC at 3.4. Platelet count is still 87.    4/24/2020  Patient was seen and examined on [facility]. Patient has postop chest discomfort but it is as expected. Patient denies any other chest pain or abdominal pain. Patient still very very weak. Patient states she wants to go home. Blood sugars range 102- 163. Blood pressure 120/56 with a heart rate of 65 and temperature 97.5. Urinary output is adequate. Oral intake is poor-fair. Patient's O2 sat is 96% on 2 L nasal cannula. 4/25/2020  Patient was seen and examined on [facility]. Patient still very weak and tired and still adamant about going home instead of extended care facility. She denies any problem with unusual chest pain besides for chest tubes are, abdominal pain, nausea or vomiting. Blood sugars range 130-150. Hemoglobin is 8.5 WBC 3.0. Temperature is 96.4 with a heart rate of 68. Blood pressure 134/60. O2 sats 98% on 2 L nasal cannula. Oral intake is fair. Chest tube still putting out 250-500 cc.    4/26/2020  Patient was seen and examined on [facility]. Patient denies any unusual shortness of breath, midsternal chest pain, abdominal pain, nausea or vomiting. Patient has discomfort around chest tube site. Patient still very weak but patient refusing to go to extended care facility.  disease note reviewed with patient still having significant drainage from chest tubes. Blood sugars range 130-158. Temperature 98. 6. Heart rate 74 with blood pressure currently 129/58. O2 sats 90% on 2 L.    4/27/2020  Patient was seen and examined on [facility]. Patient had chest tube pulled today by Dr. Emilia Lozano.  Patient states she feels better. Patient does still very weak. Blood sugars ranging from 125-158. Hemoglobin is 8.8 today. on file    Intimate partner violence     Fear of current or ex partner: Not on file     Emotionally abused: Not on file     Physically abused: Not on file     Forced sexual activity: Not on file   Other Topics Concern    Not on file   Social History Narrative    Not on file       Family History:   History reviewed. No pertinent family history. REVIEW OF SYSTEMS:    Gen: Patient denies any lightheadedness or dizziness. No LOC or syncope. No fevers or chills. HEENT: No earache, sore throat or nasal congestion. Resp: Denies cough, hemoptysis or sputum production. Cardiac: Denies chest pain, +SOB, no diaphoresis or palpitations. GI: No nausea, vomiting, diarrhea or constipation. No melena or hematochezia. : No urinary complaints, dysuria, hematuria or frequency. MSK: + extremity weakness, paralysis or paresthesias. PHYSICAL EXAM:    Vitals:  BP (!) 115/56   Pulse 72   Temp 97.4 °F (36.3 °C) (Oral)   Resp 16   Ht 5' 5\" (1.651 m)   Wt 244 lb 4 oz (110.8 kg)   SpO2 98%   BMI 40.65 kg/m²     General:  This is a 69 y.o. yo female who is alert and oriented in NAD  HEENT:  Head is normocephalic and atraumatic, PERRLA, EOMI, mucus membranes moist with no pharyngeal erythema or exudate. Neck:  Supple with no carotid bruits, JVD or thyromegaly.   No cervical adenopathy  CV:  Regular rate and rhythm, no murmurs  Lungs: Coarse breath sounds right greater than left to auscultation bilaterally with no wheezes, rales or rhonchi  Abdomen:  Soft, nontender, nondistended,+ large pannus, bowel sounds present  Extremities:  + Trace- + 1 lower leg edema, peripheral pulses intact bilaterally  Neuro:  Cranial nerves II-XII grossly intact; motor and sensory function intact with no focal deficits  Skin:  No rashes, lesions or wounds    DATA:  CBC with Differential:    Lab Results   Component Value Date    WBC 3.9 04/30/2020    RBC 3.47 04/30/2020    HGB 8.4 04/30/2020    HCT 28.1 04/30/2020    PLT 86

## 2020-04-30 NOTE — PROGRESS NOTES
weeks of incubation. 01/20/2020 1330    FUNGSM No Fungal elements seen 04/17/2020 1253     Body Fluid Culture, Sterile   Date Value Ref Range Status   04/17/2020 Growth not present  Final        ASSESSMENT/PLAN:  Fevers - improved   Right pleural effusion/loculated cx ngtd  4/17 Bronchoscopy, right video-assisted thoracic  surgery. Drainage of pleural effusion, talc pleurodesis. Pneumonia RAMONE  Cholelithiasis and gallbladder wall thickening- LFTs improving   Pancytopenia h/o liver cirrhosis/freeman       Plan:   · Oral levaquin plan 3 weeks from  4/17-5/8 [facility]  · can d/c - med rec done   · Monitor labs    Imaging and labs were reviewed per medical records. The patient was educated about the diagnosis, prognosis, indications, risks and benefits of treatment. An opportunity to ask questions was given to the patient/FAMILY.       Electronically signed by jA Latham MD on 4/30/2020 at 12:08 PM    Phone [phone]  Fax [phone]

## 2020-05-01 NOTE — TELEPHONE ENCOUNTER
I called pt. To schedule a hospital f/u and I was informed that she is going to be a the nursing home for a few weeks and that she will call the office to schedule upon discharge.

## 2020-05-19 PROBLEM — A41.9 SEPSIS (HCC): Status: ACTIVE | Noted: 2020-01-01

## 2020-05-19 NOTE — CONSULTS
Nephrology Consult  [facility]  Thomas Kiran MD    CC:   arf    HPI:   The pt is a 71 yo female who was sent in from a nursing home for mental status changes and nausea and vomiting. She was just at [facility] at the end of April for pneumonia complicated by recurrent right pleural effusion and is sp vats pleurodesis. Labs show na 13, k 4.9, co2 14, bun 104, cr 6.6, ca 8.8, alb 2.4 , wbc 16.4, hgb 10.7, plt 31. Her creatinine on 4/30/20 was 0.9. she has been started on levophed and has received a liter of ns and started on hco3 drip. She was intubated for airway protection. outpt lasix, aldactone, and metformin are on hold. Ph is 7.28 with a lactate of 8 and she is oliguric    PMH:    Past Medical History:   Diagnosis Date    Blood transfusion     Gout     Hx of blood clots     right knee    Hyperlipidemia     Hypertension     Hypothyroidism     [facility]bid [facility]     Non-insulin dependent type 2 diabetes mellitus ([facility]        Patient Active Problem List   Diagnosis    Hypothyroidism    Hypertension    Type 2 diabetes mellitus with other specified complication ([address]    Gout    Upper GI bleed    Normocytic anemia    Obesity    Esophageal varices (HCC)    DVT (deep venous thrombosis) (HCC)    Calculus of gallbladder with acute on chronic cholecystitis without obstruction    Rhabdomyolysis    Red blood cell antibody positive    Iron deficiency anemia    Recurrent right pleural effusion    Acute respiratory failure with hypoxia (HCC)    Suspected COVID-19 virus infection    Pneumonia of left lung due to infectious organism    Pancytopenia ([address]    Hyperlipidemia    Sepsis ([address]       Meds:     sodium chloride flush        norepinephrine        sodium chloride  1,000 mL Intravenous Once           Meds prn:         Meds prior to admission:     No current facility-administered medications on file prior to encounter.       Current Outpatient Medications on File Prior to Encounter

## 2020-05-19 NOTE — CONSULTS
reports no history of alcohol use. Occupational history :    Family History   No family history on file. Lines and Devices    right side SCLV TLC    Mechanical Ventilation Data   VENT SETTINGS (Comprehensive)  Vent Information  $Ventilation: $Initial Day  Skin Assessment: Clean, dry, & intact  Equipment ID: [id number]  Vent Type: 980  Vent Mode: AC/VC  Vt Ordered: 480 mL  Rate Set: 14 bmp  Peak Flow: 60 L/min  FiO2 : 50 %  SpO2: 99 %  SpO2/FiO2 ratio: 198  Sensitivity: 3  PEEP/CPAP: 5  Humidification Source: Heated wire  Humidification Temp: 37  Humidification Temp Measured: 37.1  Circuit Condensation: Not drained  Additional Respiratory  Assessments  SpO2: 99 %  Position: Semi-Green's  Humidification Source: Heated wire  Humidification Temp: 37  Circuit Condensation: Not drained  Subglottic Suction Done?: No  Airway Type: ET  Airway Size: 7.5  Cuff Pressure (cm H2O): 29 cm H2O    ABG  Lab Results   Component Value Date    PH 7.289 2020    PCO2 49.5 2020    PO2 152.8 2020    HCO3 23.2 2020    O2SAT 98.6 2020     Lab Results   Component Value Date    MODE BIPAP 2020           Vitals    oxygen saturation is 99%. Temperature Range:   No data recorded  BP Range:  No data recorded. No data recorded. Pulse Range: No data recorded  Respiration Range: No data recorded  Current Pulse Ox[de-identified]  SpO2: 99 %  24HR Pulse Ox Range:  SpO2  Av %  Min: 99 %  Max: 99 %  Oxygen Amount and Delivery:        I/O (24 Hours)    Patient Vitals for the past 8 hrs:   SpO2   20 0946 99 %     No intake or output data in the 24 hours ending 20 1036  No intake/output data recorded. No data found.       Drains/Tubes Outputs  Mc  NG tube    Exam         PHYSICAL EXAM:  CONSTITUTIONAL:  Intubated and lethargic AAOX 1  EYES:  Lids and lashes normal, pupils equal, round and reactive to light, extra ocular muscles intact, sclera clear, conjunctiva normal  LUNGS:  No increased work of breathing, good air exchange, clear to auscultation bilaterally, no crackles or wheezing  CARDIOVASCULAR:  Normal apical impulse, regular rate and rhythm, normal S1 and S2, no S3 or S4, and no murmur noted  ABDOMEN:  No scars, normal bowel sounds, soft, non-distended, non-tender, no masses palpated, no hepatosplenomegally  NEUROLOGIC:  Cranial Nerves:  cranial nerves II-XII are grossly intact  SKIN:  no bruising or bleeding    Data   Old records and images have been reviewed    Lab Results   CBC     Lab Results   Component Value Date    WBC 3.9 04/30/2020    RBC 3.47 04/30/2020    HGB 8.4 04/30/2020    HCT 28.1 04/30/2020    PLT 86 04/30/2020    MCV 81.0 04/30/2020    MCH 24.2 04/30/2020    MCHC 29.9 04/30/2020    RDW 20.7 04/30/2020    NRBC 1 04/14/2015    SEGSPCT 54 05/29/2013    LYMPHOPCT 9.6 04/30/2020    MONOPCT 3.5 04/30/2020    MYELOPCT 0.9 04/15/2020    BASOPCT 0.3 04/30/2020    MONOSABS 0.16 04/30/2020    LYMPHSABS 0.39 04/30/2020    EOSABS 0.00 04/30/2020    BASOSABS 0.00 04/30/2020       BMP   Lab Results   Component Value Date     04/30/2020    K 4.5 04/30/2020     04/30/2020    CO2 23 04/30/2020    BUN 23 04/30/2020    CREATININE 0.9 04/30/2020    GLUCOSE 124 04/30/2020    GLUCOSE 237 04/30/2012    CALCIUM 8.6 04/30/2020       LFTS  Lab Results   Component Value Date    ALKPHOS 343 04/28/2020    ALT 19 04/28/2020    AST 41 04/28/2020    PROT 5.1 04/28/2020    BILITOT 0.3 04/28/2020    BILIDIR 0.3 04/14/2020    IBILI 0.7 04/14/2020    LABALBU 2.1 04/28/2020    LABALBU 4.0 04/30/2012       INR  No results for input(s): PROTIME, INR in the last 72 hours. APTT  No results for input(s): APTT in the last 72 hours. Lactic Acid  Lab Results   Component Value Date    LACTA 1.6 04/14/2015    LACTA 1.2 03/29/2015    LACTA 3.4 03/28/2015        BNP   No results for input(s): BNP in the last 72 hours. Cultures     No results for input(s): BC in the last 72 hours.   No results for input(s):

## 2020-05-19 NOTE — H&P
Basim Babb M.D. History and Physical      CHIEF COMPLAINT: Altered mental status and acute renal failure    Reason for Admission: As above/unresponsive    History Obtained From: Patient/EMR    HISTORY OF PRESENT ILLNESS:      The patient is a 70 y.o. female of [address] [place] [address] with significant past medical history of hypertension/hyperlipidemia/hypothyroidism and N IDDM who presents from nursing facility secondary to altered mental status with acute renal failure BUN/creatinine noted to be 100/7. Patient was noted to be lethargic and barely awake. For this reason she was intubated for airway protection and subsequently transferred to medical intensive care unit at [facility]. Notable is the fact the patient was recently discharged on 4/30/2020 from [place] after VATS pleurodesis was performed for complicated pneumonia and pleural effusion. On my evaluation patient is on heating blanket. On pressor support and sedation with IV antibiotics.   BP (!) 93/47   Pulse 55   Resp 16   Wt 231 lb 4.2 oz (104.9 kg)   SpO2 98%   BMI 38.48 kg/m²    On my evaluation, on dual pressor support and bicarb drip    Chest x-ray cardiomegaly with CHF/bilateral pleural effusions right greater than left  White count 16/10/34/31 platelets  Procalcitonin 0.23 creatinine 104/6.6  Lactic acid 8.8 past Medical History:        Diagnosis Date    Blood transfusion     Gout     Hx of blood clots     right knee    Hyperlipidemia     Hypertension     Hypothyroidism     Morbid [facility]     Non-insulin dependent type 2 diabetes mellitus ([facility]      Past Surgical History:        Procedure Laterality Date    ENDOSCOPY, COLON, DIAGNOSTIC      HERNIA REPAIR      umbillical    KNEE ARTHROSCOPY  11/8/2012    right knee arthroscopy    OTHER SURGICAL HISTORY N/A 4-13-15    push enteroscopy    OTHER SURGICAL HISTORY      edd filter      2 months    THORACOSCOPY Right

## 2020-05-19 NOTE — PROCEDURES
Procedure: Insertion of HD cather via R femoral vein     Indications:  Urgent HD    Anesthesia: Local infiltration of 1% lidocaine. Consent:  Informed written consent obtained. Technique:  Procedure was done using strict aseptic technique. The patient's right groin was prepped and draped in the usual sterile fashion. The right groin was palpated for a femoral pulsation, and just medial to this, it was injected with 1% lidocaine. Then using the Seldinger technique, a large-bore needle was placed into the femoral vein with good backflow. A guidewire was placed. Then using an 11 blade, a small incision was made in the patient's skin. The large-bore needle was removed. A dilator was passed over the guidewire. Once completed, a dual lumen catheter was placed over the guidewire with the guidewire then subsequently removed. Both ports were drawn and flushed with good flow. Then the catheter was sutured in place, and a sterile dressing was put in place. Number of sticks: 1. Number of Kits used: 1. Complications: No immediate complication. Estimated blood loss: About 5 ml. Comment: Patient tolerated the procedure well. Olga Pérez M.D.    Pulmonary/Critical Care Medicine

## 2020-05-20 NOTE — PROGRESS NOTES
[facility]  Nephrology Attending Progress Note  Delmy oLpez. Christiano Webster MD        SUBJECTIVE:     5/19: The pt is a 69 yo female who was sent in from a nursing home for mental status changes and nausea and vomiting. She was just at [facility] at the end of April for pneumonia complicated by recurrent right pleural effusion and is sp vats pleurodesis. Labs show na 13, k 4.9, co2 14, bun 104, cr 6.6, ca 8.8, alb 2.4 , wbc 16.4, hgb 10.7, plt 31. Her creatinine on 4/30/20 was 0.9. she has been started on levophed and has received a liter of ns and started on hco3 drip. She was intubated for airway protection. outpt lasix, aldactone, and metformin are on hold. Ph is 7.28 with a lactate of 8 and she is oliguric    5/20: pt remains intubated in icu. Sp hd last night.  Remains oliguric, on norepi      PROBLEM LIST:    Patient Active Problem List   Diagnosis    Hypothyroidism    Hypertension    Type 2 diabetes mellitus with other specified complication ([address]    Gout    Upper GI bleed    Normocytic anemia    Obesity    Esophageal varices (HCC)    DVT (deep venous thrombosis) (HCC)    Calculus of gallbladder with acute on chronic cholecystitis without obstruction    Rhabdomyolysis    Red blood cell antibody positive    Iron deficiency anemia    Recurrent right pleural effusion    Acute respiratory failure with hypoxia (HCC)    Suspected COVID-19 virus infection    Pneumonia of left lung due to infectious organism    Pancytopenia ([address]    Hyperlipidemia    Sepsis ([address]        PAST MEDICAL HISTORY:    Past Medical History:   Diagnosis Date    Blood transfusion     Gout     Hx of blood clots     right knee    Hyperlipidemia     Hypertension     Hypothyroidism     Morbid obesity ([address]     Non-insulin dependent type 2 diabetes mellitus ([address]        DIET:    No diet orders on file     PHYSICAL EXAM:     Patient Vitals for the past 24 hrs:   BP Temp Temp src Pulse Resp SpO2 Height Weight   05/20/20 1000 -- -- -- 82 24 99 % -- --   05/20/20 0945 -- -- -- 79 19 98 % -- --   05/20/20 0935 -- -- -- 77 17 99 % -- --   05/20/20 0830 -- -- -- 80 16 98 % -- --   05/20/20 0800 -- 100 °F (37.8 °C) Rectal 79 16 98 % -- --   05/20/20 0700 -- -- -- 90 24 97 % -- --   05/20/20 0600 -- 100.4 °F (38 °C) Rectal 86 19 97 % -- 236 lb 1.8 oz (107.1 kg)   05/20/20 0500 -- -- -- 89 19 97 % -- --   05/20/20 0400 -- 100.4 °F (38 °C) Rectal 91 18 96 % -- --   05/20/20 0300 -- -- -- 90 19 96 % -- --   05/20/20 0200 -- 99.9 °F (37.7 °C) Rectal 93 22 97 % -- --   05/20/20 0100 -- -- -- 85 20 99 % -- --   05/20/20 0000 (!) 124/42 98.6 °F (37 °C) Rectal 83 20 98 % -- --   05/19/20 2309 (!) 121/43 97.9 °F (36.6 °C) Rectal 91 19 99 % -- --   05/19/20 2300 -- -- -- 89 25 100 % -- --   05/19/20 2209 (!) 102/35 97.9 °F (36.6 °C) Rectal 86 23 95 % -- --   05/19/20 2200 -- 97.9 °F (36.6 °C) Rectal 87 25 93 % -- --   05/19/20 2149 (!) 112/38 97.9 °F (36.6 °C) Rectal 81 21 98 % -- --   05/19/20 2100 -- -- -- 83 24 99 % -- --   05/19/20 2006 -- -- -- 81 27 98 % -- --   05/19/20 2000 (!) 138/45 97 °F (36.1 °C) Rectal 77 19 97 % 5' 5\" (1.651 m) 231 lb 7.7 oz (105 kg)   05/19/20 1945 -- -- -- 84 -- -- -- --   05/19/20 1930 -- -- -- 80 -- -- -- --   05/19/20 1915 -- -- -- 78 -- -- -- --   05/19/20 1900 -- -- -- 79 24 99 % -- --   05/19/20 1845 -- -- -- 77 -- -- -- --   05/19/20 1830 -- -- -- 75 -- -- -- --   05/19/20 1815 -- -- -- 74 -- -- -- --   05/19/20 1800 (!) 112/42 -- -- 76 24 98 % -- --   05/19/20 1745 -- -- -- 73 -- -- -- --   05/19/20 1730 -- -- -- 71 -- -- -- --   05/19/20 1715 -- -- -- 71 -- -- -- --   05/19/20 1713 -- -- -- -- 19 98 % -- --   05/19/20 1700 -- -- -- 68 18 97 % -- --   05/19/20 1656 -- -- -- 71 -- -- -- --   05/19/20 1645 (!) 130/45 94.5 °F (34.7 °C) -- 71 20 99 % -- 231 lb 4.2 oz (104.9 kg)   05/19/20 1600 -- -- -- 72 20 95 % -- --   05/19/20 1530 -- 94.4 °F (34.7 °C) -- 68 16 -- -- --   05/19/20 1500 -- -- -- 69 16 94 % -- -- 05/19/20 1400 -- -- -- 62 15 97 % -- --   05/19/20 1318 -- -- -- -- -- 98 % -- 231 lb 4.2 oz (104.9 kg)   05/19/20 1300 -- -- -- 60 16 -- -- --   05/19/20 1200 -- (!) 89.6 °F (32 °C) Rectal 62 16 -- -- --   @      Intake/Output Summary (Last 24 hours) at 5/20/2020 1100  Last data filed at 5/20/2020 1013  Gross per 24 hour   Intake 6546 ml   Output 338 ml   Net 6208 ml         Wt Readings from Last 3 Encounters:   05/20/20 236 lb 1.8 oz (107.1 kg)   04/14/20 244 lb 4 oz (110.8 kg)   10/16/19 246 lb 14.4 oz (112 kg)       Constitutional:  Pt is intubaterd  Head: normocephalic, atraumatic  Neck: no JVD  Cardiovascular: regular rate and rhythm, no murmurs, gallops, or rubs  Respiratory:  No rales, rhochi, or wheezes  Gastrointestinal:  Soft, nontender, nondistended, bowel sounds x 4  Ext: no edema  Skin: dry, no rash  Neuro: sedated    MEDS (scheduled):    vancomycin (VANCOCIN) intermittent dosing (placeholder)   Other RX Placeholder    hydrocortisone sodium succinate PF  100 mg Intravenous Q8H    piperacillin-tazobactam  3.375 g Intravenous Q12H    And    sodium chloride   Intravenous Q12H    famotidine (PEPCID) injection  20 mg Intravenous Daily    chlorhexidine  15 mL Mouth/Throat BID       MEDS (infusions):   sodium bicarbonate infusion 150 mL/hr at 05/20/20 0555    norepinephrine 20 mcg/min (05/20/20 1009)    propofol      vasopressin (Septic Shock) infusion         MEDS (prn):      DATA:    Recent Labs     05/19/20  1037 05/20/20  0430   WBC 16.4* 16.6*   HGB 10.7* 8.2*   HCT 34.1 25.1*   MCV 79.5* 77.2*   PLT 31* 32*     Recent Labs     05/19/20  1037 05/20/20  0430    136   K 4.9 3.8   CL 94* 90*   CO2 14* 17*   * 63*   CREATININE 6.6* 4.6*   LABGLOM 6 9   GLUCOSE 165* 211*   CALCIUM 8.8 7.7*   ALT 15  --    AST 35*  --    BILITOT 1.3*  --    ALKPHOS 186*  --        Lab Results   Component Value Date    LABALBU 2.4 (L) 05/19/2020    LABALBU 2.1 (L) 04/28/2020    LABALBU 2.1 (L)

## 2020-05-20 NOTE — CARE COORDINATION
5/20 Care Coordination:Fátima was a admit from [facility], [phone]. Was sent there from [address] [place] [address] on 4/30. 5/19 was transferred from a nursing home to [facility] for altered mental status and acute kidney injury. She was found to have nausea and vomiting at the facility but was AAOX1. She was taken to the ER where she was found to have a creatinine to 7 and a bun of > 100. She was hypotensive and started on ivf and pressors. Decision was made to intubate her for airway protection. She was recently discharged from [facility] were she was treated for community acquired pneumonia and a large recurrent right side pleural effusion which required vats pleurodesis. She also had a Hx with [facility] prior to JEN, CM/SW will continue to follow for discharge planning.    Izabella SIDDIQIN,RN-BC  [phone]

## 2020-05-20 NOTE — FLOWSHEET NOTE
05/19/20 2000   Vital Signs   BP (!) 138/45   Temp 97 °F (36.1 °C)   Pulse 77   Resp 19   SpO2 97 %   Weight 231 lb 7.7 oz (105 kg)   Weight Method Bed scale   Percent Weight Change 0.1   Pain Assessment   Pain Assessment CPOT   Pain Level 0   Post-Hemodialysis Assessment   Post-Treatment Procedures Blood returned;Catheter capped, clamped with Citrate x 2 ports   Machine Disinfection Process Acid/Vinegar Clean;Bleach; Machine Absence of [employer]; Exterior Machine Disinfection   Rinseback Volume (ml) 300 ml   Total Liters Processed (l/min) 35 l/min   Dialyzer Clearance Moderately streaked   Duration of Treatment (minutes) 180 minutes   Heparin amount administered during treatment (units) 0 units   Hemodialysis Intake (ml) 300 ml   Hemodialysis Output (ml) 300 ml   NET Removed (ml) 0 ml   Tolerated Treatment Good   Patient Response to Treatment Tolerated well; no fluid removed; report given to Fredo Vigil RN   Bilateral Breath Sounds Diminished   Edema Generalized   Edema Generalized Trace   RUE Edema +1;Non-pitting   LUE Edema +1;Non-pitting   RLE Edema +1;Non-pitting   LLE Edema +1;Non-pitting   Physician Notified?  No

## 2020-05-20 NOTE — FLOWSHEET NOTE
05/20/20 1218   Encounter Summary   Services provided to: Family   Referral/Consult From:   (by phone)   Support System Family members   Continue Visiting   (5/20)   Complexity of Encounter Moderate   Spiritual Assessment Completed Yes   Spiritual/Episcopalian   Type Spiritual support   Assessment Calm;Coping; Hopeful   Intervention Explored feelings, thoughts, concerns; Active listening;Prayer;Discussed illness/injury and it's impact   Outcome Expressed gratitude;Engaged in conversation;Expressed feelings/needs/concerns     Provided emotional and prayer support for patient's daughter Lisa Stephens by phone.

## 2020-05-20 NOTE — PROGRESS NOTES
Nephrology following - pt underwent HD last night and is ordered for HD x3 hrs again today    Plan:  · Will order Vancomycin 1000 mg IV x 1 dose post-HD today  · Will continue to dose Vancomycin based on HD schedule and random Vancomycin levels  · Will monitor renal function/HD schedule closely    Will continue to follow. Thank you for the consult.     Soumya Clark, PharmD, BCPS, BCCCP  5/20/2020  12:09 PM  Pager: [phone]

## 2020-05-21 NOTE — PROGRESS NOTES
[facility]  Nephrology Attending Progress Note  Elis Ireland. Nichole Barajas MD        SUBJECTIVE:     5/19: The pt is a 71 yo female who was sent in from a nursing home for mental status changes and nausea and vomiting. She was just at [facility] at the end of April for pneumonia complicated by recurrent right pleural effusion and is sp vats pleurodesis. Labs show na 13, k 4.9, co2 14, bun 104, cr 6.6, ca 8.8, alb 2.4 , wbc 16.4, hgb 10.7, plt 31. Her creatinine on 4/30/20 was 0.9. she has been started on levophed and has received a liter of ns and started on hco3 drip. She was intubated for airway protection. outpt lasix, aldactone, and metformin are on hold. Ph is 7.28 with a lactate of 8 and she is oliguric    5/20: pt remains intubated in icu. Sp hd last night.  Remains oliguric, on norepi    5/21: pt seen on icu, remains intubated    PROBLEM LIST:    Patient Active Problem List   Diagnosis    Hypothyroidism    Hypertension    Type 2 diabetes mellitus with other specified complication ([address]    Gout    Upper GI bleed    Normocytic anemia    Obesity    Esophageal varices (HCC)    DVT (deep venous thrombosis) (HCC)    Calculus of gallbladder with acute on chronic cholecystitis without obstruction    Rhabdomyolysis    Red blood cell antibody positive    Iron deficiency anemia    Recurrent right pleural effusion    Acute respiratory failure with hypoxia (HCC)    Suspected COVID-19 virus infection    Pneumonia of left lung due to infectious organism    Pancytopenia ([address]    Hyperlipidemia    Sepsis ([address]        PAST MEDICAL HISTORY:    Past Medical History:   Diagnosis Date    Blood transfusion     Gout     Hx of blood clots     right knee    Hyperlipidemia     Hypertension     Hypothyroidism     Morbid obesity ([address]     Non-insulin dependent type 2 diabetes mellitus ([address]        DIET:    No diet orders on file     PHYSICAL EXAM:     Patient Vitals for the past 24 hrs:   BP Temp Temp src Pulse Resp SpO2 Weight   05/21/20 1200 (!) 100/39 -- -- 58 21 97 % --   05/21/20 1100 (!) 98/41 -- -- 63 19 98 % --   05/21/20 1045 (!) 105/47 -- -- 66 17 97 % --   05/21/20 1030 (!) 101/55 -- -- 67 20 96 % --   05/21/20 1015 (!) 122/44 -- -- 70 19 97 % --   05/21/20 1000 128/65 -- -- 63 18 96 % --   05/21/20 0949 (!) 91/50 97.4 °F (36.3 °C) -- 59 19 -- --   05/21/20 0945 (!) 91/50 -- -- 65 16 99 % --   05/21/20 0930 (!) 98/49 -- -- 67 15 96 % --   05/21/20 0900 -- -- -- 65 22 -- --   05/21/20 0820 122/69 97.6 °F (36.4 °C) -- 52 22 -- --   05/21/20 0800 (!) 118/52 -- -- 53 22 99 % --   05/21/20 0753 -- -- -- 54 22 99 % --   05/21/20 0700 (!) 121/43 -- -- 54 21 100 % --   05/21/20 0600 (!) 112/43 -- -- 55 18 100 % 236 lb 12.4 oz (107.4 kg)   05/21/20 0500 (!) 98/45 -- -- 64 21 97 % --   05/21/20 0400 (!) 141/47 97.7 °F (36.5 °C) -- 56 22 99 % --   05/21/20 0300 (!) 136/59 -- -- 55 25 99 % --   05/21/20 0200 -- 98.1 °F (36.7 °C) -- 56 14 99 % --   05/21/20 0100 -- -- -- 70 19 97 % --   05/21/20 0000 -- 98.1 °F (36.7 °C) -- 70 17 98 % --   05/20/20 2300 -- -- -- 71 18 98 % --   05/20/20 2200 -- -- -- 74 18 96 % --   05/20/20 2100 -- -- -- 88 26 99 % --   05/20/20 2000 -- 98.2 °F (36.8 °C) Rectal 72 17 99 % --   05/20/20 1900 -- -- -- 76 18 97 % --   05/20/20 1840 -- -- -- 72 17 97 % --   05/20/20 1800 -- -- -- 77 22 100 % --   05/20/20 1700 -- -- -- 70 21 98 % --   05/20/20 1600 -- 98.6 °F (37 °C) Rectal 73 26 98 % --   05/20/20 1535 (!) 151/45 -- -- 72 21 96 % 236 lb 15.9 oz (107.5 kg)   05/20/20 1515 -- 98.4 °F (36.9 °C) -- 74 -- -- --   05/20/20 1500 -- 98.4 °F (36.9 °C) -- 67 20 99 % --   05/20/20 1445 -- -- -- 77 -- -- --   05/20/20 1431 -- -- -- 76 -- -- --   05/20/20 1415 -- -- -- 76 -- -- --   05/20/20 1359 -- -- -- 83 -- -- --   05/20/20 1345 -- -- -- 75 -- -- --   05/20/20 1330 -- -- -- 80 -- -- --   05/20/20 1315 -- -- -- 76 -- -- --   05/20/20 1300 -- -- -- 74 17 98 % --   05/20/20 1245 -- 99 °F (37.2 °C) -- 72

## 2020-05-21 NOTE — PROGRESS NOTES
Continue pressor support for hypotension- dual pressors still        Anemia w/ thrombocytopenia   Transfuse for hgb <7   Monitor q 8 hold any antiplatelets / oac       Acute renal failure  High anion gap metabolic acidosis   hemodialysis initiated- bun creat better   continue   Renal following       DM type 2  Continue blood glucose management with iss in critically ill pt      Overall prognosis guarded    DVT Prophylaxis   PT/OT  Discharge planning       All consultants notes reviewed    Dominic Peguero MD  2:58 PM  5/21/2020

## 2020-05-21 NOTE — CONSULTS
VIKTORIA Robertson, DO   furosemide (LASIX) 40 MG tablet Take 1 tablet by mouth every morning 4/30/20   Lyndall Batter, DO   spironolactone (ALDACTONE) 100 MG tablet Take 1 tablet by mouth daily 5/1/20   Lyndall Batter, DO   lactulose [facility]) 10 GM/15ML solution Take 15 mLs by mouth 2 times daily 4/30/20   Lyndall Batter, DO   docusate sodium (COLACE, DULCOLAX) 100 MG CAPS Take 100 mg by mouth 2 times daily 4/30/20   Lyndall Batter, DO   zinc sulfate (ORAZINC) 220 (50 Zn) MG capsule Take 1 capsule by mouth daily 5/1/20   Lyndall Batter, DO   colestipol (COLESTID) 1 g tablet Take 1 g by mouth 2 times daily    Historical Provider, MD   levothyroxine (SYNTHROID) 50 MCG tablet Take 50 mcg by mouth every morning (before breakfast)    Historical Provider, MD   metFORMIN (GLUCOPHAGE) 500 MG tablet Take 500 mg by mouth daily (with breakfast)    Historical Provider, MD   influenza virus trivalent vaccine (FLUZONE) injection Inject 0.5 mLs into the muscle once Given 1/2020    Historical Provider, MD   gabapentin (NEURONTIN) 100 MG capsule Take 100 mg by mouth 3 times daily. 3/28/19   Historical Provider, MD   traZODone (DESYREL) 50 MG tablet Take 100 mg by mouth nightly  3/27/19   Historical Provider, MD   pantoprazole (PROTONIX) 40 MG tablet Take 1 tablet by mouth 2 times daily 5/16/17   Valentin Fuentes DO   rosuvastatin (CRESTOR) 10 MG tablet Take 10 mg by mouth daily    Historical Provider, MD   nadolol (CORGARD) 20 MG tablet Take 0.5 tablets by mouth daily. 4/17/15   Valentin Fuentes, DO   sertraline (ZOLOFT) 50 MG tablet Take 100 mg by mouth daily     Historical Provider, MD   allopurinol (ZYLOPRIM) 100 MG tablet Take 100 mg by mouth daily. Historical Provider, MD       No Known Allergies    No family history on file.     Social History     Tobacco Use    Smoking status: Never Smoker    Smokeless tobacco: Never Used   Substance Use Topics    Alcohol use: No    Drug use: No         Review of Systems: pertinent ROS

## 2020-05-22 NOTE — PROGRESS NOTES
GENERAL SURGERY  DAILY PROGRESS NOTE  5/22/2020    Subjective: Intubated/sedated    Objective:  /88   Pulse 64   Temp 98 °F (36.7 °C) (Axillary)   Resp 24   Ht 5' 5\" (1.651 m)   Wt 240 lb (108.9 kg)   SpO2 95%   BMI 39.94 kg/m²     GENERAL:  Intubated & sedated. Multiple areas of ecchymosis diffusely. HEAD:  Normocephalic. Atraumatic. Dried bloody lips  LUNGS:  Ventilated  CARDIOVASCULAR: RR  ABDOMEN:  Soft, non-distended, non-tender. No guarding, rigidity, rebound. NGT gastric w/o bloody output  SKIN:  Warm and dry  NEUROLOGIC:  GCS 3T    Assessment/Plan:  70 y.o. female with acute on chronic anemia. Admitted w/ respiratory failure and septic shock.   -- Anemia is likely multifactorial given her thrombocytopenia, hx HERNANDEZ Cirrhosis, sepsis, multiple areas of ecchymosis difusely  -- Monitor Hgb and transfuse RBC/Platelet as necessary per primary team  -- No immediate plans for endoscopy given lack of clinical GI bleeding and thrombocytopenia which is not correcting despite platelet transfusions  -- Continue PPI BID  -- Consider hem/onc consult    Electronically signed by Quyen Rogel MD on 5/22/2020 at 6:24 AM

## 2020-05-22 NOTE — CONSULTS
denies any melena or hematochezia, she had EGD and coloscopy done within the last year, will request the records.     The patient has pancytopenia, most likely secondary to liver cirrhosis, splenic sequestration causing the thrombocytopenia, also has a component of iron deficiency, which could be secondary  to variceal bleeding, a work-up was ordered to evaluate for nutritional deficiencies, chronic viral infection, hemolysis, autoimmune etiology. On 10/02/2019:  Hb 7.6, Hct 25.7, MCV 79.3, WBC 2.5, PLT 95  Fe 45, TIBC 397, FeSat 11%, Ferritin 23  Vitamin B-12 727, Folate 12.6  Copper 119.1  Zinc 45.9   Reticulocytes 2.5%    LILIANA negative   HIV non reactive  Thony negative    Peripheral Blood Flow Cytometry: no immunophenotypic evidence of acute leukemia or a T-cell or B-cell neoplasm. Peripheral Blood Smear:   Review of the CBC/indices reveals pancytopenia with associated absolute lymphopenia.    Review of the peripheral smear reveals a microcytic RBC   population with moderate anisocytosis and mild-moderate poikilocytosis. Occasional microcytes are seen and occasional RBCs appear hypochromic. No schistocytes are seen.    There is mild polychromasia.    Circulating granulocytes show no dysplastic changes or hypersegmentation.    No  immature circulating granulocytes or blasts are seen.    Platelets are decreased, but those present are morphologically unremarkable.      The patient does not have vitamin B12 or folate or copper deficiency, zinc is slightly decreased, started on zinc supplements, HIV testing is negative, peripheral blood flow cytometry is negative for B/T-cell lymphoma, LILIANA is negative, Thony is negative, ferritin is 23, iron saturation is 10%, she does have iron deficiency, along with anemia of chronic inflammation, I recommended to start taking oral iron twice daily, the side effects were reviewed with the patient, a prescription was sent to her pharmacy, if she is not able to tolerate

## 2020-05-22 NOTE — PROGRESS NOTES
Temp Temp src Pulse Resp SpO2 Height Weight   05/22/20 1100 -- -- -- 69 27 93 % -- --   05/22/20 1000 127/61 -- -- 70 14 95 % -- --   05/22/20 0954 -- -- -- 70 24 95 % -- --   05/22/20 0915 -- 97.4 °F (36.3 °C) -- 68 25 94 % -- --   05/22/20 0900 (!) 102/58 -- -- 70 25 94 % -- --   05/22/20 0800 -- 97.5 °F (36.4 °C) -- 73 21 94 % -- --   05/22/20 0700 (!) 82/58 -- -- 75 22 94 % -- --   05/22/20 0600 115/88 -- -- 64 24 95 % -- 240 lb (108.9 kg)   05/22/20 0500 125/76 -- -- 68 25 92 % -- --   05/22/20 0400 111/62 98 °F (36.7 °C) Axillary 71 25 90 % -- --   05/22/20 0318 -- -- -- 71 24 90 % -- --   05/22/20 0300 (!) 64/39 -- -- 77 29 (!) 89 % -- --   05/22/20 0200 -- -- -- 73 23 (!) 89 % -- --   05/22/20 0103 -- -- -- 71 22 90 % -- --   05/22/20 0100 130/71 -- -- 66 25 (!) 88 % -- --   05/22/20 0045 -- 97.1 °F (36.2 °C) Axillary 63 27 -- -- --   05/22/20 0000 (!) 113/37 97.1 °F (36.2 °C) -- 67 25 (!) 88 % -- --   05/21/20 2300 (!) 129/52 -- -- 64 29 90 % -- --   05/21/20 2200 -- -- -- 68 29 100 % -- --   05/21/20 2147 -- -- -- 65 27 91 % 5' 5\" (1.651 m) --   05/21/20 2100 -- -- -- 68 (!) 32 91 % -- --   05/21/20 2010 -- -- -- 72 20 (!) 88 % -- --   05/21/20 2000 -- 97.5 °F (36.4 °C) Axillary 68 25 91 % -- --   05/21/20 1945 (!) 74/43 -- -- 67 27 91 % -- --   05/21/20 1900 106/62 -- -- 67 26 93 % -- --   05/21/20 1800 (!) 113/45 -- -- 66 23 96 % -- --   05/21/20 1733 -- -- -- 54 17 97 % -- --   05/21/20 1700 (!) 112/54 -- -- 58 16 98 % -- --   05/21/20 1600 (!) 126/48 97 °F (36.1 °C) -- 55 14 99 % -- --   05/21/20 1500 (!) 116/46 -- -- 56 16 100 % -- --   05/21/20 1415 -- 97.4 °F (36.3 °C) -- 54 14 98 % -- --   05/21/20 1400 (!) 147/56 -- -- 56 22 99 % -- --   05/21/20 1341 (!) 146/50 97.2 °F (36.2 °C) -- 56 18 -- -- --   05/21/20 1300 (!) 117/96 -- -- 83 21 (!) 67 % -- --   05/21/20 1200 (!) 100/39 97.2 °F (36.2 °C) -- 58 21 97 % -- --   @      Intake/Output Summary (Last 24 hours) at 5/22/2020 1157  Last data filed at

## 2020-05-22 NOTE — PROGRESS NOTES
Low              [x] Anuric      OBJECTIVE:     VITAL SIGNS:  /61   Pulse 70   Temp 97.4 °F (36.3 °C)   Resp 14   Ht 5' 5\" (1.651 m)   Wt 240 lb (108.9 kg)   SpO2 95%   BMI 39.94 kg/m²   Tmax over 24 hours:  Temp (24hrs), Av.3 °F (36.3 °C), Min:97 °F (36.1 °C), Max:98 °F (36.7 °C)      Patient Vitals for the past 6 hrs:   BP Temp Pulse Resp SpO2 Weight   20 1000 127/61 -- 70 14 95 % --   20 0954 -- -- 70 24 95 % --   20 0915 -- 97.4 °F (36.3 °C) 68 25 94 % --   20 0900 (!) 102/58 -- 70 25 94 % --   20 0800 -- 97.5 °F (36.4 °C) 73 21 94 % --   20 0700 (!) 82/58 -- 75 22 94 % --   20 0600 115/88 -- 64 24 95 % 240 lb (108.9 kg)   20 0500 125/76 -- 68 25 92 % --         Intake/Output Summary (Last 24 hours) at 2020 1053  Last data filed at 2020 0600  Gross per 24 hour   Intake 3073.83 ml   Output --   Net 3073.83 ml     Wt Readings from Last 2 Encounters:   20 240 lb (108.9 kg)   20 244 lb 4 oz (110.8 kg)     Body mass index is 39.94 kg/m². PHYSICAL EXAMINATION:  CONSTITUTIONAL:  Intubated and sedated.    EYES:  Lids and lashes normal, pupils equal, round and reactive to light, extra ocular muscles intact, sclera clear, conjunctiva normal  LUNGS:  No increased work of breathing, good air exchange, clear to auscultation bilaterally, no crackles or wheezing  CARDIOVASCULAR:  Normal apical impulse, regular rate and rhythm, normal S1 and S2, no S3 or S4, and no murmur noted  ABDOMEN:  No scars, normal bowel sounds, soft, non-distended, non-tender, no masses palpated, no hepatosplenomegally  NEUROLOGIC:  Cranial Nerves:  cranial nerves II-XII are grossly intact  SKIN:  no bruising or bleeding     Any additional physical findings:    MEDICATIONS:    Scheduled Meds:   sodium chloride  20 mL Intravenous Once    pantoprazole  40 mg Intravenous BID    And    sodium chloride (PF)  10 mL Intravenous BID    insulin lispro  0-6 Units

## 2020-05-22 NOTE — CARE COORDINATION
5/22 Care Coordination: Oh Lino remains in ICU, remains on Vent and HD(New) 5/19 was transferred from a nursing home to [facility] for altered mental status and acute kidney injury. I left message for Alejandro Matson, from [employer], [phone]. To see if she is bed hold and what they offer there. i.e. If she needs HD. Lilly Garcia Also left Voice message for her daughter Micah Medina ([phone]) to just touch base. Unclear on discharge needs at this time. CM/SW will continue to follow for discharge planning.  Sánchez SIDDIQIN,RN-BC  [phone]

## 2020-05-23 NOTE — PROGRESS NOTES
Inpatient Medical Oncology Progress Note    Subjective: Intubated sedated. No fever. No chills. Objective:  /63   Pulse 56   Temp 97.4 °F (36.3 °C) (Axillary)   Resp 28   Ht 5' 5\" (1.651 m)   Wt 242 lb 8.1 oz (110 kg)   SpO2 96%   BMI 40.36 kg/m²   GENERAL: Sedated. HEENT: PERRLA; EOMI. Intubated. NECK: Supple. Without lymphadenopathy. LUNGS: Rhonchi noted bilaterally. No wheezing or crackles. CARDIOVASCULAR: Regular rate. No murmurs, rubs or gallops. ABDOMEN: Soft. Rounded. Positive bowel sounds. EXTREMITIES: Without clubbing, cyanosis, or edema. Ecchymosis noted to bert UE and bert LE.   NEUROLOGIC: No focal deficits. Diagnostics:  Lab Results   Component Value Date    WBC 9.1 05/23/2020    HGB 8.9 (L) 05/23/2020    HCT 26.9 (L) 05/23/2020    MCV 78.9 (L) 05/23/2020    PLT 15 (LL) 05/23/2020     Lab Results   Component Value Date     05/23/2020    K 4.0 05/23/2020    CL 91 (L) 05/23/2020    CO2 23 05/23/2020    BUN 36 (H) 05/23/2020    CREATININE 2.9 (H) 05/23/2020    GLUCOSE 150 (H) 05/23/2020    CALCIUM 7.6 (L) 05/23/2020    PROT 5.6 (L) 05/19/2020    LABALBU 2.4 (L) 05/19/2020    BILITOT 1.3 (H) 05/19/2020    ALKPHOS 186 (H) 05/19/2020    AST 35 (H) 05/19/2020    ALT 15 05/19/2020    LABGLOM 16 05/23/2020    GFRAA 19 05/23/2020     Impression/Plan:  70 y.o. lady, with a past medical history significant for type 2 diabetes mellitus, hypertension, hyperlipidemia, hypothyroidism, right lower extremity DVT and obesity, she was diagnosed with NAFLD liver cirrhosis in 2015, she has a history of esophageal varices, with banding, she follows regularly with Dr. Zenia Santana.  Patient was referred to the hematology office for evaluation of pancytopenia.     Admitted to ED on 05/19/2020 from nursing home with AMS and JW after presenting with N/V.      On 05/19/2020: Hb 10.7, Hct 34.1, MCV 79.5, WBC 16.4, PLT 31  , Creat 6.6  Lactic Acid 8.8  Procalcitonin 0.33  Thony

## 2020-05-23 NOTE — PROGRESS NOTES
measures 9.7 x 5.0 x 4.8 cm. The urinary bladder is decompressed by a Mc catheter and therefore not visualized. Small volume ascites is seen in the abdomen and pelvis. Incidentally noted is cholelithiasis with sludge. 1. No nephrolithiasis or hydronephrosis. 2. Cholelithiasis and sludge. 3. Small volume ascites. Objective:   Vitals: /63   Pulse 54   Temp 97.4 °F (36.3 °C) (Axillary)   Resp 28   Ht 5' 5\" (1.651 m)   Wt 242 lb 8.1 oz (110 kg)   SpO2 96%   BMI 40.36 kg/m²   General appearance: appears stated age   Skin:  No rashes or lesions  HEENT: Head: Normocephalic, no lesions, without obvious abnormality.   Neck: no adenopathy, no carotid bruit, no JVD, supple, symmetrical, trachea midline and thyroid not enlarged, symmetric, no tenderness/mass/nodules  Lungs: diminished breath sounds bibasilar  Heart: regular rate and rhythm, S1, S2 normal, no murmur, click, rub or gallop  Abdomen: soft, non-tender; bowel sounds normal; no masses,  no organomegaly  Extremities: extremities normal, atraumatic, no cyanosis or edema  Neurologic: Mental status: alertness: obtunded    Assessment:   Patient Active Problem List:     Hypothyroidism     Hypertension     Type 2 diabetes mellitus with other specified complication (HCC)     Gout     Upper GI bleed     Normocytic anemia     Obesity     Esophageal varices (HCC)     DVT (deep venous thrombosis) (HCC)     Calculus of gallbladder with acute on chronic cholecystitis without obstruction     Rhabdomyolysis     Red blood cell antibody positive     Iron deficiency anemia     Recurrent right pleural effusion     Acute respiratory failure with hypoxia (HCC)     Suspected COVID-19 virus infection     Pneumonia of left lung due to infectious organism     Pancytopenia ([address]     Hyperlipidemia     Sepsis ([address]    Plan:   IMPRESSION/RECOMMENDATIONS:       1. arf  Baseline cr 0.9 on 4/30/20  In setting of hypotension, diuretics  fena < 1  Follow uo  First hd 5/19 for for acidosis and azotemia   Patient was dialysed yesterday      2. resp failure  Per pulm  Sp vats and pna 4/2020  abx started   Sp intubation     3. Hyponatremia  Na 133>>136>134  In setting of hypovolemia as well as pulm process  Better with dialysis      4. Metabolic acidosis  Serum hco3 17  sp hco3 drip  In arf and hypotensive from hypovolemia +/-sepsis    4.  Thrombocytopenia - Pancytopenia , [facility] onc comments noted     Yung Stephens

## 2020-05-24 NOTE — PROGRESS NOTES
Nutrition Assessment (Enteral Nutrition)    Type and Reason for Visit: Consult(TF Recs)    Nutrition Recommendations: Modify current Tube Feeding to prevent overfeeding w/ sedation:     Low calorie high protein (Vital HP) @ 55 ml/hr. Will provide: 1320 ml tv, 1320 kcals, 115 gm pro, 1104 ml free water. Propofol providing additional 332 kcals at current rate.   Note hypotension on pressor, above formula contains 0 gm fiber  Note K/Phos WNL at this time w/ HD    Nutrition Needs:  · Estimated Daily Total Kcal: 8119-0389 (PS10: MV 7.3, Tmax 36.7; RMR 1642)  · Estimated Daily Protein (g):  (1.5-1.8 g/kg )  · Estimated Daily Fluid (ml/day): Per critical care      Objective Information:  · Current Nutrition Therapies:  · Oral Diet Orders: NPO   · Tube Feeding (TF) Orders:   · Feeding Route: Nasogastric  · Formula: Renal  · Rate (ml/hr):50 ml/hr    · Volume (ml/day): 1200 ml tv   · Duration: Continuous  · Goal TF & Flush Orders Provides: 2160 kcals, 97 gm pro, 872 ml free water   · Additional Calories: propofol @ 12.6 ml/hr= 332 lipid kcals        See RD progress note dated 5/22 for complete assessment   Electronically signed by Sean Branch RD, LD on 5/24/20 at 11:58 AM EDT  Contact Number: Ext [phone]

## 2020-05-24 NOTE — PROGRESS NOTES
Critical Care Team - Daily Progress Note         Date and time: 5/24/2020 9:49 AM  Patient's name:  Skylar Cardona  Medical Record Number: [id number]  Patient's account/billing number: [de-identified]  Patient's YOB: 1945  Age: 70 y.o. Date of Admission: 5/19/2020  9:17 AM  Length of stay during current admission: 5      Primary Care Physician: Radha Mattson DO  ICU Attending Physician: David Uribe DO    Code Status: Prior    Reason for ICU admission: resp failure, septic shock      SUBJECTIVE     Pressor requirements decreasing. OBJECTIVE     Vital signs:   height is 5' 5\" (1.651 m) and weight is 242 lb 8.1 oz (110 kg). Her axillary temperature is 97 °F (36.1 °C). Her blood pressure is 133/58 (abnormal) and her pulse is 72. Her respiration is 19 and oxygen saturation is 92%. I/O last 3 completed shifts: In: 2154 [I.V.:1700; NG/GT:454]  Out: 0       PHYSICAL EXAM:    Physical Exam  Constitutional:       Appearance: She is ill-appearing. Interventions: She is sedated and intubated. HENT:      Head: Normocephalic and atraumatic. Eyes:      Conjunctiva/sclera: Conjunctivae normal.   Neck:      Musculoskeletal: Neck supple. Trachea: No tracheal deviation. Cardiovascular:      Rate and Rhythm: Normal rate and regular rhythm. Heart sounds: Normal heart sounds. Pulmonary:      Effort: She is intubated. Breath sounds: Rhonchi present. Abdominal:      General: Bowel sounds are normal.      Palpations: Abdomen is soft. Tenderness: There is no abdominal tenderness. Musculoskeletal:         General: Swelling present. Lymphadenopathy:      Cervical: No cervical adenopathy. Skin:     General: Skin is warm and dry. Findings: No rash.            MEDICATIONS:  Scheduled Meds:   sodium chloride flush        sodium chloride  20 mL Intravenous Once    pantoprazole  40 mg Intravenous BID    And    sodium chloride (PF)  10 mL Intravenous BID    insulin lispro  0-6 Units Subcutaneous Q4H    hydrocortisone sodium succinate PF  100 mg Intravenous Q8H    piperacillin-tazobactam  3.375 g Intravenous Q12H    And    sodium chloride   Intravenous Q12H    chlorhexidine  15 mL Mouth/Throat BID     Continuous Infusions:   sodium chloride 50 mL/hr at 05/24/20 0433    dextrose      norepinephrine 3 mcg/min (05/24/20 0701)    propofol 15 mcg/kg/min (05/24/20 0630)     PRN Meds:glucose, dextrose, glucagon (rDNA), dextrose, anticoagulant sodium citrate    PERTINENT LAB RESULTS:  Labs reviewed. DIAGNOSTICS:  Imaging reviewed. ASSESMENT/PLAN     Assessment:    1. Acute hypoxic/hypercapnic resp failur with VDRF  2. Shock with MOSF  3. Acute metabolic encephalopathy  4. Shock - multifactorial: vasodilatory (septic), hypovolemic, AI  5. Lactic acidosis  6. JW requiring HD  7. Pneumonia, possible aspiration  8. Adrenal insufficiency  9. Hypothyroidism  10. Morbid obesity with BMI 40.4  11. Pancytopenia; transfused 1pRBC, 2 platelet  12. 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 S/p right VATS decort with pleurodesis 4/17/2020      Plan:    Neuro/Psych: AMS  -propofol for sedation, SAT    CV: Shock - vasopressor dose decreasing    -pressors for MAP > 65  -arterial line positional  -solu-cortef    Pulm: resp failure in setting of shock, pneumonia, pulm edema. R VATs/decort with pleurodesis 4/17 - CXR improving  -adjust Vt, repeat ABG  -following imaging  -wean as rebecca    ID: Pneumonia. Possible aspiration. Cx pending. 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 Previous esophagram with no obstruction but possible spasm  -Tf's to goal, tolerating    Renal: JW started on HD  -nephro following    Heme: Pancytopenia  -Hem consulted, recs appreciated    Endo: started Solu-cortef for AI.  Cortisol 20 5/19    Proph:  GI - SUP - PPI  DVT - SCDs    Remains critically ill        Total critical care time spent reviewing chart records and managing patient at the bedside is 38 minutes exclusive of procedures or overlap.     ELECTRONICALLY SIGNED:  Marina Ulloa DO

## 2020-05-24 NOTE — PROGRESS NOTES
last 72 hours. -----------------------------------------------------------------  RAD: Xr Chest Portable    Result Date: 2020  Patient MRN:  [id number] : 1945 Age: 70 years Gender: Female Order Date:  2020 6:00 AM EXAM: XR CHEST PORTABLE INDICATION:  ventilated ventilated COMPARISON: 2020 FINDINGS:  Bibasilar infiltrates and moderate left effusion are unchanged. Upper lobes appear normal. Lines/tubes are appropriate. No interval change     Xr Chest Portable    Result Date: 2020  Patient MRN: [id number] : 1945 Age:  70 years Gender: Female Order Date: 2020 10:15 AM Exam: XR CHEST PORTABLE Number of Images: 1 view Indication:   line and tube placement from other hospital line and tube placement from other hospital Comparison: Prior study from 2020 is available Findings: Study demonstrated mild cardiomegaly. There is bilateral pleural effusion with mild interstitial pulmonary edema. There is fluid seen in the right minor fissure. The support lines including endotracheal tube and nasogastric tube and right-sided PIC catheter with tip in superior vena cava to be in satisfactory position. There is uncoiling atherosclerotic change of thoracic aorta. The bony thorax demonstrate no gross abnormality. Support lines appears to be in satisfactory position Cardiomegaly with congestive heart failure with bilateral pleural effusion right. In the left     Us Retroperitoneal Complete    Result Date: 2020  Patient MRN:  [id number] : 1945 Age: 70 years Gender: Female Order Date:  2020 12:15 PM EXAM: US RETROPERITONEAL COMPLETE NUMBER OF IMAGES:  170), 8 12/15/2020. views INDICATION:  arf arf COMPARISON: None FINDINGS: The kidneys are grossly normal in size, contour with mild cortical thinning. 2.3 cm probable cyst is seen along the upper pole of the right kidney. No stone or hydronephrosis is seen. The right kidney measures 10.2 x 5.3 x 5.7 cm.  The left kidney measures

## 2020-05-25 NOTE — PROGRESS NOTES
Progress Note  5/25/2020 12:08 PM  Subjective:   Admit Date: 5/19/2020  PCP: Israel Duff DO  Interval History:                               Patient examined doing much the same , remains intubated     Diet: DIET TUBE FEED CONTINUOUS/CYCLIC NPO; Low Calorie High Protein; Orogastric; Continuous; 10; 55; 24    Data:   Scheduled Meds:   sodium chloride  20 mL Intravenous Once    pantoprazole  40 mg Intravenous BID    And    sodium chloride (PF)  10 mL Intravenous BID    insulin lispro  0-6 Units Subcutaneous Q4H    hydrocortisone sodium succinate PF  100 mg Intravenous Q8H    piperacillin-tazobactam  3.375 g Intravenous Q12H    And    sodium chloride   Intravenous Q12H    chlorhexidine  15 mL Mouth/Throat BID     Continuous Infusions:   sodium chloride 50 mL/hr at 05/25/20 0415    dextrose      norepinephrine 5 mcg/min (05/25/20 1127)    propofol 15 mcg/kg/min (05/25/20 1127)     PRN Meds:glucose, dextrose, glucagon (rDNA), dextrose, anticoagulant sodium citrate  I/O last 3 completed shifts: In: 3119 [I.V.:1695; NG/GT:1324; IV Piggyback:100]  Out: -   No intake/output data recorded. Intake/Output Summary (Last 24 hours) at 5/25/2020 1208  Last data filed at 5/25/2020 0600  Gross per 24 hour   Intake 3059 ml   Output --   Net 3059 ml     CBC:   Recent Labs     05/23/20  0520 05/24/20  0432 05/25/20  0450   WBC 9.1 10.9 16.6*   HGB 8.9* 8.6* 9.3*   PLT 15* 18* 38*     BMP:    Recent Labs     05/23/20  0520 05/24/20  0432 05/25/20  0450    130* 133   K 4.0 3.8 3.1*   CL 91* 88* 91*   CO2 23 23 21*   BUN 36* 47* 59*   CREATININE 2.9* 3.3* 3.5*   GLUCOSE 150* 196* 214*     Hepatic: No results for input(s): AST, ALT, ALB, BILITOT, ALKPHOS in the last 72 hours. Troponin: No results for input(s): TROPONINI in the last 72 hours. BNP: No results for input(s): BNP in the last 72 hours. Lipids: No results for input(s): CHOL, HDL in the last 72 hours.     Invalid input(s): LDLCALCU  ABGs: No results found for: PHART, PO2ART, VNO5SAE  INR: No results for input(s): INR in the last 72 hours. -----------------------------------------------------------------  RAD: Xr Chest Portable    Result Date: 2020  Patient MRN:  [id number] : 1945 Age: 70 years Gender: Female Order Date:  2020 6:00 AM EXAM: XR CHEST PORTABLE INDICATION:  ventilated ventilated COMPARISON: 2020 FINDINGS:  Bibasilar infiltrates and moderate left effusion are unchanged. Upper lobes appear normal. Lines/tubes are appropriate. No interval change     Xr Chest Portable    Result Date: 2020  Patient MRN: [id number] : 1945 Age:  70 years Gender: Female Order Date: 2020 10:15 AM Exam: XR CHEST PORTABLE Number of Images: 1 view Indication:   line and tube placement from other hospital line and tube placement from other hospital Comparison: Prior study from 2020 is available Findings: Study demonstrated mild cardiomegaly. There is bilateral pleural effusion with mild interstitial pulmonary edema. There is fluid seen in the right minor fissure. The support lines including endotracheal tube and nasogastric tube and right-sided PIC catheter with tip in superior vena cava to be in satisfactory position. There is uncoiling atherosclerotic change of thoracic aorta. The bony thorax demonstrate no gross abnormality. Support lines appears to be in satisfactory position Cardiomegaly with congestive heart failure with bilateral pleural effusion right. In the left     Us Retroperitoneal Complete    Result Date: 2020  Patient MRN:  [id number] : 1945 Age: 70 years Gender: Female Order Date:  2020 12:15 PM EXAM: US RETROPERITONEAL COMPLETE NUMBER OF IMAGES:  170), 8 12/15/2020. views INDICATION:  arf arf COMPARISON: None FINDINGS: The kidneys are grossly normal in size, contour with mild cortical thinning. 2.3 cm probable cyst is seen along the upper pole of the right kidney.  No stone or hydronephrosis is seen. The right kidney measures 10.2 x 5.3 x 5.7 cm. The left kidney measures 9.7 x 5.0 x 4.8 cm. The urinary bladder is decompressed by a Mc catheter and therefore not visualized. Small volume ascites is seen in the abdomen and pelvis. Incidentally noted is cholelithiasis with sludge. 1. No nephrolithiasis or hydronephrosis. 2. Cholelithiasis and sludge. 3. Small volume ascites. Objective:   Vitals: BP (!) 133/58   Pulse 58   Temp 95.9 °F (35.5 °C) (Axillary)   Resp 27   Ht 5' 5\" (1.651 m)   Wt 255 lb 4.7 oz (115.8 kg)   SpO2 92%   BMI 42.48 kg/m²   General appearance: appears stated age   Skin:  No rashes or lesions  HEENT: Head: Normocephalic, no lesions, without obvious abnormality.   Neck: no adenopathy, no carotid bruit, no JVD, supple, symmetrical, trachea midline and thyroid not enlarged, symmetric, no tenderness/mass/nodules  Lungs: diminished breath sounds bibasilar  Heart: regular rate and rhythm, S1, S2 normal, no murmur, click, rub or gallop  Abdomen: soft, non-tender; bowel sounds normal; no masses,  no organomegaly  Extremities: extremities normal, atraumatic, no cyanosis or edema  Neurologic: Mental status: alertness: obtunded    Assessment:   Patient Active Problem List:     Hypothyroidism     Hypertension     Type 2 diabetes mellitus with other specified complication (HCC)     Gout     Upper GI bleed     Normocytic anemia     Obesity     Esophageal varices (HCC)     DVT (deep venous thrombosis) (HCC)     Calculus of gallbladder with acute on chronic cholecystitis without obstruction     Rhabdomyolysis     Red blood cell antibody positive     Iron deficiency anemia     Recurrent right pleural effusion     Acute respiratory failure with hypoxia (HCC)     Suspected COVID-19 virus infection     Pneumonia of left lung due to infectious organism     Pancytopenia (HCC)     Hyperlipidemia     Sepsis ([facility] [address]    Plan:     IMPRESSION/RECOMMENDATIONS:       1. arf  Baseline cr 0.9 on 4/30/20  In setting of hypotension, diuretics  fena < 1  Follow uo  First hd 5/19 for  for acidosis and azotemia   Hemodialysis today      2. resp failure  Per pulm  Sp vats and pna 4/2020  abx started   Sp intubation     3. Hyponatremia  Na 133>>136>134  In setting of hypovolemia as well as pulm process  Better with dialysis      4. Metabolic acidosis better post dialysis     In arf and hypotensive from hypovolemia +/-sepsis     4.  Thrombocytopenia - Pancytopenia , [facility] comments noted        Yung Dahl

## 2020-05-25 NOTE — PROGRESS NOTES
Critical Care Team - Daily Progress Note           Reason for ICU admission: resp failure, septic shock      SUBJECTIVE     On 5 Levophed  Still not responding and become agitated and she just open eyes  No follow commands  Grow yeast   On HD   CXR fluid overload +16 L     OBJECTIVE     Vital signs:   height is 5' 5\" (1.651 m) and weight is 255 lb 4.7 oz (115.8 kg). Her axillary temperature is 95.9 °F (35.5 °C). Her blood pressure is 133/58 (abnormal) and her pulse is 58. Her respiration is 27 and oxygen saturation is 92%. I/O last 3 completed shifts: In: 3119 [I.V.:1695; NG/GT:1324; IV Piggyback:100]  Out: -       PHYSICAL EXAM:    Physical Exam  Constitutional:       Appearance: She is ill-appearing. Interventions: She is sedated and intubated. HENT:      Head: Normocephalic and atraumatic. Eyes:      Conjunctiva/sclera: Conjunctivae normal.   Neck:      Musculoskeletal: Neck supple. Trachea: No tracheal deviation. Cardiovascular:      Rate and Rhythm: Normal rate and regular rhythm. Heart sounds: Normal heart sounds. Pulmonary:      Effort: She is intubated. Breath sounds: Rhonchi present. Abdominal:      General: Bowel sounds are normal.      Palpations: Abdomen is soft. Tenderness: There is no abdominal tenderness. Musculoskeletal:         General: Swelling present. Lymphadenopathy:      Cervical: No cervical adenopathy. Skin:     General: Skin is warm and dry. Findings: No rash.            MEDICATIONS:  Scheduled Meds:   sodium chloride  20 mL Intravenous Once    pantoprazole  40 mg Intravenous BID    And    sodium chloride (PF)  10 mL Intravenous BID    insulin lispro  0-6 Units Subcutaneous Q4H    hydrocortisone sodium succinate PF  100 mg Intravenous Q8H    piperacillin-tazobactam  3.375 g Intravenous Q12H    And    sodium chloride   Intravenous Q12H    chlorhexidine  15 mL Mouth/Throat BID     Continuous Infusions:   sodium chloride 50

## 2020-05-25 NOTE — PROGRESS NOTES
increased on the left. Differential includes ARDS, pulmonary edema, atelectasis, infection,   and/or layering pleural effusions. XR CHEST PORTABLE   Final Result   Changing pattern of infiltrates with some improvement on the left and   worsening on the right               XR CHEST PORTABLE   Final Result   No interval change               XR CHEST PORTABLE   Final Result   No interval change               US RETROPERITONEAL COMPLETE   Final Result      1. No nephrolithiasis or hydronephrosis. 2. Cholelithiasis and sludge. 3. Small volume ascites. XR CHEST PORTABLE   Final Result      Support lines appears to be in satisfactory position      Cardiomegaly with congestive heart failure with bilateral pleural   effusion right. In the left         XR CHEST PORTABLE    (Results Pending)       Assessment    Active Problems:    Sepsis ([address]  Resolved Problems:    * No resolved hospital problems.  *      Plan:     Admit to medical intensive care unit for evaluation of sepsis with acute respiratory failure  Vent management/per critical care team  IV fluids  Broad-spectrum IV antibiotic therapy pending pancultures- vanc/Zosyn- adjust renally  Pancultures pending  White count remains around 16,000 since admission  Swallow study once stable for aspiration  Serial lactic acids   Continue pressor support for hypotension-Levophed wean as tolerated    Anemia w/ thrombocytopenia   Transfuse for hgb <7   Monitor q 8 hold any antiplatelets / oac         Acute renal failure  High anion gap metabolic acidosis   hemodialysis initiated- bun creat better   continue   Renal following       DM type 2  Continue blood glucose management with iss in critically ill pt      Overall prognosis guarded  Would consider withdrawal of care  Currently DNR CCA     DVT Prophylaxis   PT/OT  Discharge planning        Electronically signed by Gucci Dubois MD on 5/25/2020 at 1:46 PM

## 2020-05-26 NOTE — PROGRESS NOTES
encephalopathy    Plan:  1. Vent weaning as able  2. Remains on Zosyn  3. Dialysis and volume removal per renal  4.  Prognosis is poor - possible transition to comfort care soon    Discussed with Dr. Victor M Cruz     Electronically signed by Yuki Adrian MD on 5/26/2020 at 2:08 PM

## 2020-05-26 NOTE — PROGRESS NOTES
Critical Care Team - Daily Progress Note           Reason for ICU admission: resp failure, septic shock      SUBJECTIVE     On 5 Levophed  Still not responding and become agitated and she just open eyes  No follow commands  Grow yeast   On HD   CXR fluid overload +16 L     OBJECTIVE     Vital signs:   height is 5' 5\" (1.651 m) and weight is 264 lb 8.8 oz (120 kg). Her esophageal temperature is 97.5 °F (36.4 °C). Her blood pressure is 119/44 (abnormal) and her pulse is 84. Her respiration is 20 and oxygen saturation is 95%. I/O last 3 completed shifts: In: 9198 [I.V.:2041; NG/GT:1432]  Out: 2400 [Stool:400]      PHYSICAL EXAM:    Physical Exam  Constitutional:       Appearance: She is ill-appearing. Interventions: She is sedated and intubated. HENT:      Head: Normocephalic and atraumatic. Eyes:      Conjunctiva/sclera: Conjunctivae normal.   Neck:      Musculoskeletal: Neck supple. Trachea: No tracheal deviation. Cardiovascular:      Rate and Rhythm: Normal rate and regular rhythm. Heart sounds: Normal heart sounds. Pulmonary:      Effort: She is intubated. Breath sounds: Rhonchi present. Abdominal:      General: Bowel sounds are normal.      Palpations: Abdomen is soft. Tenderness: There is no abdominal tenderness. Musculoskeletal:         General: Swelling present. Lymphadenopathy:      Cervical: No cervical adenopathy. Skin:     General: Skin is warm and dry. Findings: No rash.            MEDICATIONS:  Scheduled Meds:   sodium chloride  20 mL Intravenous Once    pantoprazole  40 mg Intravenous BID    And    sodium chloride (PF)  10 mL Intravenous BID    insulin lispro  0-6 Units Subcutaneous Q4H    hydrocortisone sodium succinate PF  100 mg Intravenous Q8H    piperacillin-tazobactam  3.375 g Intravenous Q12H    And    sodium chloride   Intravenous Q12H    chlorhexidine  15 mL Mouth/Throat BID     Continuous Infusions:   sodium chloride 50 mL/hr at 05/26/20 0151    dextrose      norepinephrine 3 mcg/min (05/26/20 1045)    propofol 20 mcg/kg/min (05/26/20 0822)     PRN Meds:glucose, dextrose, glucagon (rDNA), dextrose, anticoagulant sodium citrate    PERTINENT LAB RESULTS:  Labs reviewed. DIAGNOSTICS:  Imaging reviewed. ASSESMENT/PLAN     Assessment:    1. Acute hypoxic/hypercapnic resp failur with VDRF  2. Shock with MOSF  3. Acute metabolic encephalopathy  4. Shock - multifactorial: vasodilatory (septic), hypovolemic, AI  5. Lactic acidosis  6. JW requiring HD  7. Pneumonia, possible aspiration  8. Adrenal insufficiency  9. Hypothyroidism  10. Morbid obesity with BMI 40.4  11. Pancytopenia; transfused 1pRBC, 2 platelet  12. 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 S/p right VATS decort with pleurodesis 4/17/2020      Plan:    Neuro/Psych:  Acute encephalopathy   Not responsive with wean sedation   Not following commands     CV: Shock - vasopressor ,on Levophed 3 mcg   -pressors for MAP > 65  -arterial line positional  -solu-cortef  Discuss with Dr Mar Siegel if she can remove more fluiid ,CXR Pulmonary edema     Pulm:  Acute  resp failure in setting of shock, pneumonia, pulm edema. R VATs/decort with pleurodesis 4/17 - CXR fluid overload   -adjust Vt, repeat ABG  -following imaging  -wean as tolerated     ID:   Aspiration Pneumonia. Mao Cuff Cx pending. 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 Previous esophagram with no obstruction but possible spasm  -Tf's to goal, tolerating    Renal: JW started on HD  -nephro following  Need to remove more fluid   IV + 16 L     Heme: Pancytopenia  -Hem consulted, recs appreciated    Endo: started Solu-cortef for AI.  Cortisol 20 5/19    Proph:  GI - SUP - PPI  DVT - SCDs    Remains critically ill  Discuss with Sultana and she had HD ,she has multi organ failure and with all going DNR CCA ,family want DNR CCA and if not better withdraw support next 24 days ,I do not see much improvement ,will address again in am       Care reviewed with nursing staff, medical and surgical specialty care, primary care and the patient's family as available. Chart review/lab review/X-ray viewing/documentation and had long Conversation with patient/family re: prognosis, care options and any end of life issues:      Critical care time spent reviewing labs/films, examining patient, collaborating with other physicians more than 33 Minutes  excluding procedures . Marilu Finn M.D.   5/26/2020  11:58 Mary Marlow MD

## 2020-05-26 NOTE — PROGRESS NOTES
Progress Note  5/26/2020 11:55 AM  Subjective:   Admit Date: 5/19/2020  PCP: Dwaine Barrett DO  Interval History:     5/26: pt seen in icu, sp hd yesterday, on tube feeds    Diet: DIET TUBE FEED CONTINUOUS/CYCLIC NPO; Low Calorie High Protein; Orogastric; Continuous; 10; 55; 24    Data:   Scheduled Meds:   sodium chloride  20 mL Intravenous Once    pantoprazole  40 mg Intravenous BID    And    sodium chloride (PF)  10 mL Intravenous BID    insulin lispro  0-6 Units Subcutaneous Q4H    hydrocortisone sodium succinate PF  100 mg Intravenous Q8H    piperacillin-tazobactam  3.375 g Intravenous Q12H    And    sodium chloride   Intravenous Q12H    chlorhexidine  15 mL Mouth/Throat BID     Continuous Infusions:   sodium chloride 50 mL/hr at 05/26/20 0151    dextrose      norepinephrine 3 mcg/min (05/26/20 1045)    propofol 20 mcg/kg/min (05/26/20 0822)     PRN Meds:glucose, dextrose, glucagon (rDNA), dextrose, anticoagulant sodium citrate  I/O last 3 completed shifts: In: 4701 [I.V.:2041; NG/GT:1432]  Out: 2400 [Stool:400]  I/O this shift:  In: 370 [NG/GT:220; IV Piggyback:150]  Out: -     Intake/Output Summary (Last 24 hours) at 5/26/2020 1155  Last data filed at 5/26/2020 1139  Gross per 24 hour   Intake 4043 ml   Output 2400 ml   Net 1643 ml     CBC:   Recent Labs     05/24/20  0432 05/25/20  0450 05/26/20  0440   WBC 10.9 16.6* 20.6*   HGB 8.6* 9.3* 9.5*   PLT 18* 38* 51*     BMP:    Recent Labs     05/24/20  0432 05/25/20  0450 05/26/20  0440   * 133 132   K 3.8 3.1* 3.3*   CL 88* 91* 92*   CO2 23 21* 21*   BUN 47* 59* 50*   CREATININE 3.3* 3.5* 3.0*   GLUCOSE 196* 214* 248*     Hepatic: No results for input(s): AST, ALT, ALB, BILITOT, ALKPHOS in the last 72 hours. Troponin: No results for input(s): TROPONINI in the last 72 hours. BNP: No results for input(s): BNP in the last 72 hours. Lipids: No results for input(s): CHOL, HDL in the last 72 hours.     Invalid input(s): LDLCALCU  ABGs: No hydronephrosis is seen. The right kidney measures 10.2 x 5.3 x 5.7 cm. The left kidney measures 9.7 x 5.0 x 4.8 cm. The urinary bladder is decompressed by a Mc catheter and therefore not visualized. Small volume ascites is seen in the abdomen and pelvis. Incidentally noted is cholelithiasis with sludge. 1. No nephrolithiasis or hydronephrosis. 2. Cholelithiasis and sludge. 3. Small volume ascites. Objective:   Vitals: BP (!) 119/44   Pulse 84   Temp 97.5 °F (36.4 °C) (Esophageal)   Resp 20   Ht 5' 5\" (1.651 m)   Wt 264 lb 8.8 oz (120 kg)   SpO2 95%   BMI 44.02 kg/m²   General appearance: appears stated age   Skin:  No rashes or lesions  HEENT: Head: Normocephalic, no lesions, without obvious abnormality.   Neck: no adenopathy, no carotid bruit, no JVD, supple, symmetrical, trachea midline and thyroid not enlarged, symmetric, no tenderness/mass/nodules  Lungs: diminished breath sounds bibasilar  Heart: regular rate and rhythm, S1, S2 normal, no murmur, click, rub or gallop  Abdomen: soft, non-tender; bowel sounds normal; no masses,  no organomegaly  Extremities: extremities normal, atraumatic, no cyanosis or edema  Neurologic: Mental status: alertness: obtunded    Assessment:   Patient Active Problem List:     Hypothyroidism     Hypertension     Type 2 diabetes mellitus with other specified complication (HCC)     Gout     Upper GI bleed     Normocytic anemia     Obesity     Esophageal varices (HCC)     DVT (deep venous thrombosis) (HCC)     Calculus of gallbladder with acute on chronic cholecystitis without obstruction     Rhabdomyolysis     Red blood cell antibody positive     Iron deficiency anemia     Recurrent right pleural effusion     Acute respiratory failure with hypoxia (HCC)     Suspected COVID-19 virus infection     Pneumonia of left lung due to infectious organism     Pancytopenia (HCC)     Hyperlipidemia     Sepsis ([facility] [address]    Plan:   IMPRESSION/RECOMMENDATIONS:       1. arf  Baseline cr 0.9 on 4/30/20  In setting of hypotension, diuretics  fena < 1  Follow uo  First hd 5/19 for  for acidosis and azotemia   Last hd 5/25   [id number] anuric     2. resp failure  Per pulm  Sp vats and pna 4/2020  abx started   Sp intubation     3. Hyponatremia  Na 133>>136>134> 132  In setting of hypovolemia as well as pulm process  Better with dialysis      4. Metabolic acidosis better post dialysis   Serum hco3  sp hco3 drip  In arf and hypotensive from hypovolemia +/-sepsis     4.  Thrombocytopenia - Pancytopenia   Heme onc comments noted     Josh Mcdermott

## 2020-05-26 NOTE — CARE COORDINATION
Patient remains in [address] [id number] on vent with levo and prop. Covid (-) 4/28. Patient's code status needs addressed as code status states prior which was full code; however Dr Douglas Kim notes he states family is requesting [facility] at this time with possible withdrawal of care in future if patient's status doesn't improve. Patient came to us from [facility]; I called Nate Alvarado [phone] to see if patient is a bedhold/ needs precert to return; however she had already left for the day and no way to leave . I called patient's son Gloria Vaughn- no answer. Next I called son Jak Morales; he states he is the oldest, however states his sister Chet Hill is [address]. I called and LVM requesting Chet Hill return my call. CM/SW will continue to follow for discharge planning. Update: Chet Hill returned my call and she states she will be coming up to the hospital tomorrow; their plan at that time will be to withdrawal care and terminally extubate.

## 2020-05-27 NOTE — PROGRESS NOTES
Intravenous Q12H    And    sodium chloride   Intravenous Q12H    chlorhexidine  15 mL Mouth/Throat BID     Continuous Infusions:   dextrose      norepinephrine Stopped (20 1704)    propofol 10 mcg/kg/min (20 0864)     PRN Meds:LORazepam, morphine, glucose, dextrose, glucagon (rDNA), dextrose, anticoagulant sodium citrate    PERTINENT LAB RESULTS:  Labs reviewed. DIAGNOSTICS:  Imaging reviewed. ASSESMENT/PLAN     Assessment:    1. Acute hypoxic/hypercapnic resp failur with VDRF  2. Shock with MOSF  3. Acute metabolic encephalopathy  4. Shock - multifactorial: vasodilatory (septic), hypovolemic, AI  5. Lactic acidosis  6. JW requiring HD  7. Pneumonia, possible aspiration  8. Adrenal insufficiency  9. Hypothyroidism  10. Morbid obesity with BMI 40.4  11. Pancytopenia; transfused 1pRBC, 2 platelet  12. 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 S/p right VATS decort with pleurodesis 2020      Plan:  Long discussion with family about plan of care   At this time they would to  withdraw support and make her comfort measures  Will use Morphine small doses to keep her comfortable     Patient  11:48 am     Care reviewed with nursing staff, medical and surgical specialty care, primary care and the patient's family as available. Chart review/lab review/X-ray viewing/documentation and had long Conversation with patient/family re: prognosis, care options and any end of life issues:      Critical care time spent reviewing labs/films, examining patient, collaborating with other physicians more than 33 Minutes  excluding procedures . Nicole Bo M.D.   2020  12:59 PM      Soo Bearden MD

## 2020-05-27 NOTE — PLAN OF CARE
Problem: Falls - Risk of:  Goal: Will remain free from falls  Outcome: Met This Shift     Problem: Falls - Risk of:  Goal: Absence of physical injury  Outcome: Met This Shift     Problem: Skin Integrity - Impaired:  Goal: Will show no infection signs and symptoms  Outcome: Met This Shift     Problem: Skin Integrity - Impaired:  Goal: Absence of new skin breakdown  Outcome: Met This Shift
Problem: Falls - Risk of:  Goal: Will remain free from falls  Outcome: Met This Shift  Goal: Absence of physical injury  Outcome: Met This Shift     Problem: Bowel Function - Altered:  Goal: Bowel elimination is within specified parameters  Outcome: Met This Shift     Problem: Skin Integrity - Impaired:  Goal: Will show no infection signs and symptoms  Outcome: Met This Shift  Goal: Absence of new skin breakdown  5/25/2020 2007 by Shelley Collier RN  Outcome: Met This Shift    Plan of care discussed with patient / family.
Problem: Restraint Use - Nonviolent/Non-Self-Destructive Behavior:  Goal: Absence of restraint indications  5/24/2020 1533 by Ry Escobar RN  Outcome: Completed     Problem: Restraint Use - Nonviolent/Non-Self-Destructive Behavior:  Goal: Absence of restraint-related injury  5/24/2020 1533 by Sacha Miller RN  Outcome: Completed
Problem: Restraint Use - Nonviolent/Non-Self-Destructive Behavior:  Goal: Absence of restraint-related injury  Description: Absence of restraint-related injury  5/23/2020 0610 by Beatriz Dugan RN  Outcome: Met This Shift  5/22/2020 1919 by Sim Oshea RN  Outcome: Met This Shift     Problem: Falls - Risk of:  Goal: Will remain free from falls  Description: Will remain free from falls  5/23/2020 0610 by Beatriz Dugan RN  Outcome: Met This Shift  5/22/2020 1919 by Nicky Umanzor RN  Outcome: Met This Shift  Goal: Absence of physical injury  Description: Absence of physical injury  5/23/2020 0610 by Beatriz Dugan RN  Outcome: Met This Shift  5/22/2020 1919 by Sim Oshea RN  Outcome: Met This Shift     Problem:  Bowel Function - Altered:  Goal: Bowel elimination is within specified parameters  Description: Bowel elimination is within specified parameters  Outcome: Met This Shift     Problem: Gas Exchange - Impaired:  Goal: Levels of oxygenation will improve  Description: Levels of oxygenation will improve  Outcome: Met This Shift     Problem: Skin Integrity - Impaired:  Goal: Absence of new skin breakdown  Description: Absence of new skin breakdown  5/22/2020 1919 by Sim Oshea RN  Outcome: Met This Shift
Problem: Restraint Use - Nonviolent/Non-Self-Destructive Behavior:  Goal: Absence of restraint-related injury  Description: Absence of restraint-related injury  Outcome: Met This Shift     Problem: Falls - Risk of:  Goal: Will remain free from falls  Description: Will remain free from falls  Outcome: Met This Shift  Goal: Absence of physical injury  Description: Absence of physical injury  Outcome: Met This Shift     Problem: Skin Integrity - Impaired:  Goal: Absence of new skin breakdown  Description: Absence of new skin breakdown  Outcome: Met This Shift
Problem: Restraint Use - Nonviolent/Non-Self-Destructive Behavior:  Goal: Absence of restraint-related injury  Outcome: Met This Shift     Problem: Restraint Use - Nonviolent/Non-Self-Destructive Behavior:  Goal: Absence of restraint indications  Outcome: Not Met This Shift
and symptoms  5/27/2020 1405 by Chery Johnson RN  Outcome: Completed  5/27/2020 0151 by Ishmael Craig RN  Outcome: Ongoing     Problem: Gas Exchange - Impaired:  Goal: Levels of oxygenation will improve  Description: Levels of oxygenation will improve  5/27/2020 1405 by Chery Johnson RN  Outcome: Completed  5/27/2020 0151 by Ishmael Craig RN  Outcome: Met This Shift     Problem: Mental Status - Impaired:  Goal: Mental status will be restored to baseline  Description: Mental status will be restored to baseline  5/27/2020 1405 by Chery Johnson RN  Outcome: Completed  5/27/2020 0151 by Ishmael Craig RN  Outcome: Not Met This Shift     Problem: Pain:  Goal: Pain level will decrease  Description: Pain level will decrease  Outcome: Completed  Goal: Recognizes and communicates pain  Description: Recognizes and communicates pain  Outcome: Completed  Goal: Control of acute pain  Description: Control of acute pain  Outcome: Completed  Goal: Control of chronic pain  Description: Control of chronic pain  Outcome: Completed     Problem: Serum Glucose Level - Abnormal:  Goal: Ability to maintain appropriate glucose levels will improve to within specified parameters  Description: Ability to maintain appropriate glucose levels will improve to within specified parameters  Outcome: Completed     Problem: Skin Integrity - Impaired:  Goal: Will show no infection signs and symptoms  Description: Will show no infection signs and symptoms  5/27/2020 1405 by Chery Johnson RN  Outcome: Completed  5/27/2020 0151 by Ishmael Craig RN  Outcome: Met This Shift  Goal: Absence of new skin breakdown  Description: Absence of new skin breakdown  5/27/2020 1405 by Chery Johnson RN  Outcome: Completed  5/27/2020 0151 by Ishmael Craig RN  Outcome: Met This Shift
no infection signs and symptoms  Description: Will show no infection signs and symptoms  5/25/2020 0017 by Angelica Huerta RN  Outcome: Ongoing     Problem: Skin Integrity - Impaired:  Goal: Absence of new skin breakdown  Description: Absence of new skin breakdown  5/25/2020 0017 by Angelica Huerta RN  Outcome: Ongoing     Problem: Fluid Volume:  Goal: Will show no signs or symptoms of fluid imbalance  Description: Will show no signs or symptoms of fluid imbalance  Outcome: Not Met This Shift     Problem:  Bowel Function - Altered:  Goal: Bowel elimination is within specified parameters  Description: Bowel elimination is within specified parameters  Outcome: Not Met This Shift

## 2020-06-09 LAB
AFB CULTURE (MYCOBACTERIA): NORMAL
AFB SMEAR: NORMAL

## 2021-01-18 NOTE — PROGRESS NOTES
Department of Internal Medicine            HISTORY OF PRESENT ILLNESS:      The patient is a 69 y.o. female who presents with chronic right thigh pain is been present for about 5 months with increased weakness and falling. Patient was having increased shortness of breath when laying down. Patient also has chronic bilateral lower extremity edema. Patient initially denied any fever and chills or cough. CT chest done in the ED showed moderate to large right pleural effusion with a large patchy groundglass opacity in left upper lobe as well is a smaller peripheral groundglass opacity in lingula. The patient initially was worked up for [place] [address] and was given to the [facility] hospitalist group. Patient was consult with cardiology, pulmonary, GI and vascular surgery for a VATS procedure. Patient was cleared by cardiology and GI and pulmonary and was set up for surgery today. Patient currently denies any chest pain or abdominal pain. Patient's temperature is been normal throughout hospitalization with current heart rate of 68 and sinus rhythm respiratory rate of 20 and blood pressure currently 148/67. O2 saturation 96% on 1 L nasal cannula. Patient blood sugars range 107- 130 off of her Glucophage. WBC is 2.3 with hemoglobin 7.1. Platelet count is 80. Patient was ordered 2 units of packed RBCs to be transfused this morning. 4/18/2020  Patient was seen and examined on telemetry floor. Case discussed with Dr. Mally Hyde today. Patient doing very well and has postop discomfort. Blood sugars ranging from 116-165. Hemoglobin is 8.8 with a WBC of 5.2 and platelet count 88. Temperature 97.3 with a heart rate of 70. Blood pressure currently 145/63 with O2 sat in 99%. Urine output is fairly good. 4/19/2020  Patient was seen and examined on telemetry floor. Patient very sleepy today. She complains of spasm around surgical site but otherwise she feels fairly good. Temperature 97.8 with a heart rate of 68.   Respiratory with respiratory rate of 18. Patient currently on 4 L nasal cannula with an O2 sat of 98% at rest.  Urinary output is adequate. Oral intake is fair-poor. BUN/creatinine is 37/0.9. Blood sugars range 151-168. Hemoglobin is 8.6 with WBC at 3.4. Platelet count is still 87. Past Medical History:    Past Medical History:   Diagnosis Date    Blood transfusion     Gout     Hx of blood clots     right knee    Hyperlipidemia     Hypertension     Hypothyroidism     Morbid [facility] [address]     Non-insulin dependent type 2 diabetes mellitus ([facility] [address]      Past Surgical History:    Past Surgical History:   Procedure Laterality Date    ENDOSCOPY, COLON, DIAGNOSTIC      HERNIA REPAIR      umbillical    KNEE ARTHROSCOPY  11/8/2012    right knee arthroscopy    OTHER SURGICAL HISTORY N/A 4-13-15    push enteroscopy    OTHER SURGICAL HISTORY      edd filter      2 months    THORACOSCOPY Right 4/17/2020    FIBEROPTIC BRONCHOSCOPY, RIGHT VIDEO ASSISTED THORACOSCOPY DECORTACATION AND PLEURIDESIS performed by Walker Proctor MD at [address]         Medications Prior to Admission:    @  Prior to Admission medications    Medication Sig Start Date End Date Taking? Authorizing Provider   colestipol (COLESTID) 1 g tablet Take 1 g by mouth 2 times daily   Yes Historical Provider, MD   levothyroxine (SYNTHROID) 50 MCG tablet Take 50 mcg by mouth every morning (before breakfast)   Yes Historical Provider, MD   metFORMIN (GLUCOPHAGE) 500 MG tablet Take 500 mg by mouth daily (with breakfast)   Yes Historical Provider, MD   zinc sulfate (ORAZINC) 220 (50 Zn) MG capsule Take 1 capsule by mouth daily 10/16/19 10/15/20 Yes Primus Camera, MD   gabapentin (NEURONTIN) 100 MG capsule Take 100 mg by mouth 3 times daily.   3/28/19  Yes Historical Provider, MD   traZODone (DESYREL) 50 MG tablet Take 100 mg by mouth nightly  3/27/19  Yes Historical Provider, MD   furosemide (LASIX) 20 MG tablet Take 20 mg by mouth 18-Jan-2021 09:18

## 2022-11-11 NOTE — PROGRESS NOTES
All of your medications from before your hospitalization are the same EXCEPT:  Your new prescription for you to start is Levaquin, flagyl, mesalamine, and prednisone for colitis. Please take all of your medications as prescribed. If prescribed any medications, please read all pharmacy instructions and inserts. Inform your doctor and pharmacist about all other medications and alternative therapies. Please follow up with your PCP in 1-2 weeks to be reassessed after your hospital stay. Please also follow up with GI. If you start feeling any symptoms similar to what brought you into the hospital, please come back to the ED to be re-evaluated. Department of Internal Medicine            HISTORY OF PRESENT ILLNESS:      The patient is a 69 y.o. female who presents with chronic right thigh pain is been present for about 5 months with increased weakness and falling. Patient was having increased shortness of breath when laying down. Patient also has chronic bilateral lower extremity edema. Patient initially denied any fever and chills or cough. CT chest done in the ED showed moderate to large right pleural effusion with a large patchy groundglass opacity in left upper lobe as well is a smaller peripheral groundglass opacity in lingula. The patient initially was worked up for [place] [address] and was given to the [place] hospitalist group. Patient was consult with cardiology, pulmonary, GI and vascular surgery for a VATS procedure. Patient was cleared by cardiology and GI and pulmonary and was set up for surgery today. Patient currently denies any chest pain or abdominal pain. Patient's temperature is been normal throughout hospitalization with current heart rate of 68 and sinus rhythm respiratory rate of 20 and blood pressure currently 148/67. O2 saturation 96% on 1 L nasal cannula. Patient blood sugars range 107- 130 off of her Glucophage. WBC is 2.3 with hemoglobin 7.1. Platelet count is 80. Patient was ordered 2 units of packed RBCs to be transfused this morning. 4/18/2020  Patient was seen and examined on telemetry floor. Case discussed with Dr. Rogelio Lau today. Patient doing very well and has postop discomfort. Blood sugars ranging from 116-165. Hemoglobin is 8.8 with a WBC of 5.2 and platelet count 88. Temperature 97.3 with a heart rate of 70. Blood pressure currently 145/63 with O2 sat in 99%. Urine output is fairly good. 4/19/2020  Patient was seen and examined on telemetry floor. Patient very sleepy today. She complains of spasm around surgical site but otherwise she feels fairly good. Temperature 97.8 with a heart rate of 68.   Respiratory LABVLDL 18 09/07/2019     TSH:    Lab Results   Component Value Date    TSH 8.430 04/14/2020     IRON:    Lab Results   Component Value Date    IRON 45 10/02/2019     LIPASE:    Lab Results   Component Value Date    LIPASE 84 03/29/2015       ASSESSMENT AND PLAN:      Patient Active Problem List    Diagnosis Date Noted    [facility] 04/13/2015     Priority: High    Upper GI bleed 03/28/2015     Priority: High    DVT (deep venous thrombosis) ([facility] [address] 04/13/2015     Priority: Medium    Normocytic anemia 03/28/2015     Priority: Medium    Obesity 03/28/2015     Priority: Medium    Hypothyroidism      Priority: Medium    Hypertension      Priority: Medium    Type 2 diabetes mellitus with other specified complication ([facility] [address]      Priority: Medium    Gout      Priority: Medium    Red blood cell antibody positive 04/15/2020    Iron deficiency anemia 04/15/2020    Recurrent right pleural effusion 04/15/2020    Acute respiratory failure with hypoxia (HCC)     Suspected COVID-19 virus infection     Pneumonia of left lung due to infectious organism     Pancytopenia ([facility] [address]     Hyperlipidemia     Rhabdomyolysis 04/14/2020    Calculus of gallbladder with acute on chronic cholecystitis without obstruction 04/12/2019     1. Acute hypoxic respiratory failure  2. Large right pleural [id number] post VATS on 4/17/2020  3. Community-acquired pneumonia  4. Pancytopenia  5. Jefm Pong liver cirrhosis with history of esophageal varices  6. Non-insulin-dependent diabetes mellitus type 2  7. History of DVT with IVC filter in place  8. History of hypothyroidism  9. Chronic kidney disease stage III    Plan:    IV fluids normal saline 50 cc an hour  Glucoscans 4 times daily with sliding scale insulin  Cardiology, thoracic surgery, GI,ID, pulmonary consulted  Routine labs in a.     MAIKEL Nation.OHeri  4/27/2020  11:25 AM 11-Nov-2022 00:00

## (undated) DEVICE — TROCAR: Brand: KII® SLEEVE

## (undated) DEVICE — Z INACTIVE NO SUPPLIER IDENTIFIED TROCAR ENDO L80MM DIA50MM THOR FLEX SL FLXPATH

## (undated) DEVICE — SOLUTION IRRIG 1500ML 0.9% SOD CHL USP POUR PLAS BTL

## (undated) DEVICE — BLOWER PWD 3GM FOR STERITALC TALCAIR NOVATECH

## (undated) DEVICE — CAUTERY: TIP CLEANER XR 100/CS: Brand: MEDICAL ACTION INDUSTRIES

## (undated) DEVICE — DOUBLE BASIN SET: Brand: MEDLINE INDUSTRIES, INC.

## (undated) DEVICE — SET SURG INSTR DISSECT

## (undated) DEVICE — SET ENDO INSTR RED YEL LAPAROSCOPIC

## (undated) DEVICE — CAMERA STRYKER 1488 HD GEN

## (undated) DEVICE — TROCAR: Brand: KII FIOS FIRST ENTRY

## (undated) DEVICE — GAUZE,SPONGE,4"X4",16PLY,XRAY,STRL,LF: Brand: MEDLINE

## (undated) DEVICE — NDL CNTR 40CT FM MAG: Brand: MEDLINE INDUSTRIES, INC.

## (undated) DEVICE — THIS PRODUCT IS SINGLE USE AND INTENDED TO BE USED FOR BLUNT DISSECTION OF TISSUE.: Brand: ASPEN® ENDOSCOPIC KITTNER, SINGLE TIP

## (undated) DEVICE — SYRINGE MED 10ML TRNSLUC BRL PLUNG BLK MRK POLYPR CTRL

## (undated) DEVICE — Z INACTIVE USE 2660664 SOLUTION IRRIG 3000ML 0.9% SOD CHL USP UROMATIC PLAS CONT

## (undated) DEVICE — CATH URETH 18FR PVC RND TIP 2

## (undated) DEVICE — PAD,NON-ADHERENT,3X8,STERILE,LF,1/PK: Brand: MEDLINE

## (undated) DEVICE — HYPODERMIC SAFETY NEEDLE: Brand: MAGELLAN

## (undated) DEVICE — MAGNETIC DRAPE: Brand: DEVON

## (undated) DEVICE — MARKER,SKIN,WI/RULER AND LABELS: Brand: MEDLINE

## (undated) DEVICE — ELECTRODE PT RET AD L9FT HI MOIST COND ADH HYDRGEL CORDED

## (undated) DEVICE — GOWN,SIRUS,NONRNF,SETINSLV,XL,20/CS: Brand: MEDLINE

## (undated) DEVICE — PITCHER PT 1200ML W HNDL CSR WRP

## (undated) DEVICE — GLOVE ORANGE PI 7 1/2   MSG9075

## (undated) DEVICE — GAUZE,SPONGE,2"X2",8PLY,STERILE,LF,2'S: Brand: MEDLINE

## (undated) DEVICE — CANNULA IV 18GA L15IN BLNT FILL LUERLOCK HUB MJCT

## (undated) DEVICE — YANKAUER,BULB TIP,W/O VENT,RIGID,STERILE: Brand: MEDLINE

## (undated) DEVICE — INTENDED FOR TISSUE SEPARATION, AND OTHER PROCEDURES THAT REQUIRE A SHARP SURGICAL BLADE TO PUNCTURE OR CUT.: Brand: BARD-PARKER ® STAINLESS STEEL BLADES

## (undated) DEVICE — TUBING, SUCTION, 1/4" X 10', STRAIGHT: Brand: MEDLINE

## (undated) DEVICE — LIMB HOLDER, WRIST/ANKLE: Brand: DEROYAL

## (undated) DEVICE — APPLICATOR PREP 26ML 0.7% IOD POVACRYLEX 74% ISO ALC ST

## (undated) DEVICE — ANTI-FOG SOLUTION WITH FOAM PAD: Brand: DEVON

## (undated) DEVICE — COVER,LIGHT HANDLE,FLX,1/PK: Brand: MEDLINE INDUSTRIES, INC.

## (undated) DEVICE — 3M™ IOBAN™ 2 ANTIMICROBIAL INCISE DRAPE 6650EZ: Brand: IOBAN™ 2

## (undated) DEVICE — PUMP SUC IRR TBNG L10FT W/ HNDPC ASSEMB STRYKEFLOW 2

## (undated) DEVICE — SYRINGE MED 20ML STD CLR PLAS LUERLOCK TIP N CTRL DISP

## (undated) DEVICE — CATHETER THORACENTESIS STR 28 FRX23 IN 6 EYELET TAPR TIP LF

## (undated) DEVICE — PACK,LAPAROTOMY,NO GOWNS: Brand: MEDLINE

## (undated) DEVICE — SYRINGE IRRIG 60ML SFT PLIABLE BLB EZ TO GRP 1 HND USE W/

## (undated) DEVICE — PENCIL ES L3M BTTN SWCH HOLSTER W/ BLDE ELECTRD EDGE

## (undated) DEVICE — TOTAL TRAY, 16FR 10ML SIL FOLEY, URN: Brand: MEDLINE

## (undated) DEVICE — GAUZE,SPONGE,4"X4",16PLY,STRL,LF,10/TRAY: Brand: MEDLINE

## (undated) DEVICE — DRESSING TRNSPAR W2XL2.75IN FLM SHT SEMIPERMEABLE WIND

## (undated) DEVICE — SPONGE,DRAIN,NONWVN,4"X4",6PLY,STRL,LF: Brand: MEDLINE